# Patient Record
Sex: MALE | Race: OTHER | HISPANIC OR LATINO | URBAN - METROPOLITAN AREA
[De-identification: names, ages, dates, MRNs, and addresses within clinical notes are randomized per-mention and may not be internally consistent; named-entity substitution may affect disease eponyms.]

---

## 2022-12-12 ENCOUNTER — INPATIENT (INPATIENT)
Facility: HOSPITAL | Age: 32
LOS: 6 days | Discharge: HOME | End: 2022-12-19
Attending: HOSPITALIST | Admitting: HOSPITALIST
Payer: MEDICAID

## 2022-12-12 VITALS
DIASTOLIC BLOOD PRESSURE: 62 MMHG | RESPIRATION RATE: 16 BRPM | HEART RATE: 84 BPM | OXYGEN SATURATION: 100 % | SYSTOLIC BLOOD PRESSURE: 116 MMHG | TEMPERATURE: 97 F

## 2022-12-12 LAB
ALBUMIN SERPL ELPH-MCNC: 3.4 G/DL — SIGNIFICANT CHANGE UP (ref 3.3–5)
ALP SERPL-CCNC: 160 U/L — HIGH (ref 40–120)
ALT FLD-CCNC: 16 U/L — SIGNIFICANT CHANGE UP (ref 4–41)
ANION GAP SERPL CALC-SCNC: 12 MMOL/L — SIGNIFICANT CHANGE UP (ref 7–14)
AST SERPL-CCNC: 43 U/L — HIGH (ref 4–40)
BILIRUB SERPL-MCNC: 0.4 MG/DL — SIGNIFICANT CHANGE UP (ref 0.2–1.2)
BUN SERPL-MCNC: 6 MG/DL — LOW (ref 7–23)
CALCIUM SERPL-MCNC: 8.9 MG/DL — SIGNIFICANT CHANGE UP (ref 8.4–10.5)
CHLORIDE SERPL-SCNC: 104 MMOL/L — SIGNIFICANT CHANGE UP (ref 98–107)
CO2 SERPL-SCNC: 25 MMOL/L — SIGNIFICANT CHANGE UP (ref 22–31)
CREAT SERPL-MCNC: 0.36 MG/DL — LOW (ref 0.5–1.3)
CRP SERPL-MCNC: 12.9 MG/L — HIGH
EGFR: 154 ML/MIN/1.73M2 — SIGNIFICANT CHANGE UP
ERYTHROCYTE [SEDIMENTATION RATE] IN BLOOD: 69 MM/HR — HIGH (ref 1–15)
GLUCOSE SERPL-MCNC: 94 MG/DL — SIGNIFICANT CHANGE UP (ref 70–99)
HCT VFR BLD CALC: 29.2 % — LOW (ref 39–50)
HGB BLD-MCNC: 8.7 G/DL — LOW (ref 13–17)
HIV 1+2 AB+HIV1 P24 AG SERPL QL IA: SIGNIFICANT CHANGE UP
IANC: 1.54 K/UL — LOW (ref 1.8–7.4)
MAGNESIUM SERPL-MCNC: 1.7 MG/DL — SIGNIFICANT CHANGE UP (ref 1.6–2.6)
MCHC RBC-ENTMCNC: 23.1 PG — LOW (ref 27–34)
MCHC RBC-ENTMCNC: 29.8 GM/DL — LOW (ref 32–36)
MCV RBC AUTO: 77.7 FL — LOW (ref 80–100)
PHOSPHATE SERPL-MCNC: 4.1 MG/DL — SIGNIFICANT CHANGE UP (ref 2.5–4.5)
PLATELET # BLD AUTO: 282 K/UL — SIGNIFICANT CHANGE UP (ref 150–400)
POTASSIUM SERPL-MCNC: 3.7 MMOL/L — SIGNIFICANT CHANGE UP (ref 3.5–5.3)
POTASSIUM SERPL-SCNC: 3.7 MMOL/L — SIGNIFICANT CHANGE UP (ref 3.5–5.3)
PROT SERPL-MCNC: 8.8 G/DL — HIGH (ref 6–8.3)
RBC # BLD: 3.76 M/UL — LOW (ref 4.2–5.8)
RBC # FLD: 21.2 % — HIGH (ref 10.3–14.5)
SODIUM SERPL-SCNC: 141 MMOL/L — SIGNIFICANT CHANGE UP (ref 135–145)
WBC # BLD: 3.53 K/UL — LOW (ref 3.8–10.5)
WBC # FLD AUTO: 3.53 K/UL — LOW (ref 3.8–10.5)

## 2022-12-12 PROCEDURE — 73590 X-RAY EXAM OF LOWER LEG: CPT | Mod: 26,RT

## 2022-12-12 PROCEDURE — 99285 EMERGENCY DEPT VISIT HI MDM: CPT

## 2022-12-12 RX ORDER — PIPERACILLIN AND TAZOBACTAM 4; .5 G/20ML; G/20ML
3.38 INJECTION, POWDER, LYOPHILIZED, FOR SOLUTION INTRAVENOUS ONCE
Refills: 0 | Status: COMPLETED | OUTPATIENT
Start: 2022-12-12 | End: 2022-12-12

## 2022-12-12 RX ORDER — VANCOMYCIN HCL 1 G
1000 VIAL (EA) INTRAVENOUS ONCE
Refills: 0 | Status: COMPLETED | OUTPATIENT
Start: 2022-12-12 | End: 2022-12-12

## 2022-12-12 RX ORDER — OXYCODONE HYDROCHLORIDE 5 MG/1
5 TABLET ORAL ONCE
Refills: 0 | Status: DISCONTINUED | OUTPATIENT
Start: 2022-12-12 | End: 2022-12-12

## 2022-12-12 RX ADMIN — PIPERACILLIN AND TAZOBACTAM 200 GRAM(S): 4; .5 INJECTION, POWDER, LYOPHILIZED, FOR SOLUTION INTRAVENOUS at 22:38

## 2022-12-12 RX ADMIN — OXYCODONE HYDROCHLORIDE 5 MILLIGRAM(S): 5 TABLET ORAL at 22:49

## 2022-12-12 NOTE — ED PROVIDER NOTE - PHYSICAL EXAMINATION
PHYSICAL EXAM:  GENERAL: appears in pain  HEAD:  Atraumatic, Normocephalic  EYES: EOMI, PERRLA, conjunctiva and sclera clear  ENT: Moist mucous membranes  NECK: Supple, No JVD  CHEST/LUNG: Clear to auscultation bilaterally; No rales, rhonchi, wheezing, or rubs. Unlabored respirations  HEART: Regular rate and rhythm; No murmurs, rubs, or gallops  ABDOMEN: Bowel sounds present; Soft, Nontender, Nondistended.  EXTREMITIES: bilateral lower extremity edema, worse on right side with cellulitic changes, RLE wound approximately 2 cm x 2cm with purulence   NERVOUS SYSTEM: Alert & Oriented X3, speech clear. No deficits   MSK: FROM all 4 extremities, full and equal strength  SKIN: No rashes or lesions

## 2022-12-12 NOTE — ED ADULT TRIAGE NOTE - CHIEF COMPLAINT QUOTE
Pt presents to ED ambulatory with c/o nonhealing wound to R lower extremity x 3 years. Pt reports multiple hospital visits with "no treatment". Pt reports minimal outpatient follow up. Pt appears unkept.

## 2022-12-12 NOTE — ED PROVIDER NOTE - OBJECTIVE STATEMENT
Mr. Hinds is a Bulgarian-speaking 33 y/o male current smoker, current alcohol use with no known PMHx who presents with worsening right lower extremity wound. He reports worsening pain for the past few weeks which is why he came to this hospital, previously visited multiple hospitals where he got pain meds but no further workup or treatment. 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 Denies fever or chills at home. Patient is currently homeless, reports intermittent marijuana use.

## 2022-12-12 NOTE — ED PROVIDER NOTE - CARE PLAN
Assessment and plan of treatment:	Obtain labs, RLE xray, administer empiric antibiotics.   Principal Discharge DX:	Cellulitis  Assessment and plan of treatment:	Obtain labs, RLE xray, administer empiric antibiotics.   1

## 2022-12-12 NOTE — ED PROVIDER NOTE - ATTENDING CONTRIBUTION TO CARE
I have personally performed a face to face medical and diagnostic evaluation of the patient. I have discussed with and reviewed the Resident's note and agree with the History, ROS, Physical Exam and MDM unless otherwise indicated. A brief summary of my personal evaluation and impression can be found below.    32-year-old male with no past medical history, and no primary care follow-up outpatient presenting with chief complaint of right leg wound.  Patient states its been going on for multiple years, but recently started becoming more tender and more purulent.  Patient is homeless and has not seen a doctor in multiple years.  No fevers, chills, nausea, vomiting.  No antibiotics for the wound.  On exam, vital signs stable, bilateral leg edema, open wound of the right shin with some purulence.  No crepitus on exam.  Patient denies any other complaints.  Will obtain labs including ESR, CRP.  Will obtain x-ray to assess for tracking gas or underlying osteo-.  Given duration of symptoms will start on broad spectrum antibiotics.  Patient will need admission for more comprehensive work-up.  Reassess to dispo. Colton Herrera, ED Attending

## 2022-12-13 DIAGNOSIS — F10.239 ALCOHOL DEPENDENCE WITH WITHDRAWAL, UNSPECIFIED: ICD-10-CM

## 2022-12-13 DIAGNOSIS — D63.8 ANEMIA IN OTHER CHRONIC DISEASES CLASSIFIED ELSEWHERE: ICD-10-CM

## 2022-12-13 DIAGNOSIS — L03.90 CELLULITIS, UNSPECIFIED: ICD-10-CM

## 2022-12-13 DIAGNOSIS — K72.00 ACUTE AND SUBACUTE HEPATIC FAILURE WITHOUT COMA: ICD-10-CM

## 2022-12-13 DIAGNOSIS — Z65.9 PROBLEM RELATED TO UNSPECIFIED PSYCHOSOCIAL CIRCUMSTANCES: ICD-10-CM

## 2022-12-13 DIAGNOSIS — Z29.9 ENCOUNTER FOR PROPHYLACTIC MEASURES, UNSPECIFIED: ICD-10-CM

## 2022-12-13 LAB
A1C WITH ESTIMATED AVERAGE GLUCOSE RESULT: 5.7 % — HIGH (ref 4–5.6)
ALBUMIN SERPL ELPH-MCNC: 2.8 G/DL — LOW (ref 3.3–5)
ALP SERPL-CCNC: 131 U/L — HIGH (ref 40–120)
ALT FLD-CCNC: 19 U/L — SIGNIFICANT CHANGE UP (ref 4–41)
ANION GAP SERPL CALC-SCNC: 10 MMOL/L — SIGNIFICANT CHANGE UP (ref 7–14)
ANISOCYTOSIS BLD QL: SLIGHT — SIGNIFICANT CHANGE UP
APAP SERPL-MCNC: <10 UG/ML — LOW (ref 15–25)
AST SERPL-CCNC: 40 U/L — SIGNIFICANT CHANGE UP (ref 4–40)
BASOPHILS # BLD AUTO: 0.1 K/UL — SIGNIFICANT CHANGE UP (ref 0–0.2)
BASOPHILS NFR BLD AUTO: 2.7 % — HIGH (ref 0–2)
BILIRUB DIRECT SERPL-MCNC: <0.2 MG/DL — SIGNIFICANT CHANGE UP (ref 0–0.3)
BILIRUB INDIRECT FLD-MCNC: >0.4 MG/DL — SIGNIFICANT CHANGE UP (ref 0–1)
BILIRUB SERPL-MCNC: 0.6 MG/DL — SIGNIFICANT CHANGE UP (ref 0.2–1.2)
BUN SERPL-MCNC: 6 MG/DL — LOW (ref 7–23)
CALCIUM SERPL-MCNC: 8.8 MG/DL — SIGNIFICANT CHANGE UP (ref 8.4–10.5)
CHLORIDE SERPL-SCNC: 103 MMOL/L — SIGNIFICANT CHANGE UP (ref 98–107)
CO2 SERPL-SCNC: 24 MMOL/L — SIGNIFICANT CHANGE UP (ref 22–31)
CREAT SERPL-MCNC: 0.48 MG/DL — LOW (ref 0.5–1.3)
EGFR: 142 ML/MIN/1.73M2 — SIGNIFICANT CHANGE UP
EOSINOPHIL # BLD AUTO: 0.19 K/UL — SIGNIFICANT CHANGE UP (ref 0–0.5)
EOSINOPHIL NFR BLD AUTO: 5.3 % — SIGNIFICANT CHANGE UP (ref 0–6)
ESTIMATED AVERAGE GLUCOSE: 117 — SIGNIFICANT CHANGE UP
ETHANOL SERPL-MCNC: 45 MG/DL — HIGH
FERRITIN SERPL-MCNC: 40 NG/ML — SIGNIFICANT CHANGE UP (ref 30–400)
FLUAV AG NPH QL: SIGNIFICANT CHANGE UP
FLUBV AG NPH QL: SIGNIFICANT CHANGE UP
GLUCOSE SERPL-MCNC: 80 MG/DL — SIGNIFICANT CHANGE UP (ref 70–99)
HAV IGM SER-ACNC: SIGNIFICANT CHANGE UP
HBV CORE IGM SER-ACNC: SIGNIFICANT CHANGE UP
HBV SURFACE AG SER-ACNC: SIGNIFICANT CHANGE UP
HCV AB S/CO SERPL IA: 0.15 S/CO — SIGNIFICANT CHANGE UP (ref 0–0.99)
HCV AB SERPL-IMP: SIGNIFICANT CHANGE UP
HYPOCHROMIA BLD QL: SIGNIFICANT CHANGE UP
IRON SATN MFR SERPL: 10 % — LOW (ref 14–50)
IRON SATN MFR SERPL: 30 UG/DL — LOW (ref 45–165)
LYMPHOCYTES # BLD AUTO: 1.32 K/UL — SIGNIFICANT CHANGE UP (ref 1–3.3)
LYMPHOCYTES # BLD AUTO: 37.5 % — SIGNIFICANT CHANGE UP (ref 13–44)
MAGNESIUM SERPL-MCNC: 1.7 MG/DL — SIGNIFICANT CHANGE UP (ref 1.6–2.6)
MICROCYTES BLD QL: SIGNIFICANT CHANGE UP
MONOCYTES # BLD AUTO: 0.16 K/UL — SIGNIFICANT CHANGE UP (ref 0–0.9)
MONOCYTES NFR BLD AUTO: 4.5 % — SIGNIFICANT CHANGE UP (ref 2–14)
NEUTROPHILS # BLD AUTO: 1.64 K/UL — LOW (ref 1.8–7.4)
NEUTROPHILS NFR BLD AUTO: 46.4 % — SIGNIFICANT CHANGE UP (ref 43–77)
PHOSPHATE SERPL-MCNC: 4.6 MG/DL — HIGH (ref 2.5–4.5)
PLAT MORPH BLD: ABNORMAL
PLATELET COUNT - ESTIMATE: NORMAL — SIGNIFICANT CHANGE UP
POIKILOCYTOSIS BLD QL AUTO: SLIGHT — SIGNIFICANT CHANGE UP
POLYCHROMASIA BLD QL SMEAR: SLIGHT — SIGNIFICANT CHANGE UP
POTASSIUM SERPL-MCNC: 3.6 MMOL/L — SIGNIFICANT CHANGE UP (ref 3.5–5.3)
POTASSIUM SERPL-SCNC: 3.6 MMOL/L — SIGNIFICANT CHANGE UP (ref 3.5–5.3)
PROT SERPL-MCNC: 7.9 G/DL — SIGNIFICANT CHANGE UP (ref 6–8.3)
RBC BLD AUTO: ABNORMAL
RSV RNA NPH QL NAA+NON-PROBE: SIGNIFICANT CHANGE UP
SALICYLATES SERPL-MCNC: <0.3 MG/DL — LOW (ref 15–30)
SARS-COV-2 RNA SPEC QL NAA+PROBE: SIGNIFICANT CHANGE UP
SODIUM SERPL-SCNC: 137 MMOL/L — SIGNIFICANT CHANGE UP (ref 135–145)
TIBC SERPL-MCNC: 309 UG/DL — SIGNIFICANT CHANGE UP (ref 220–430)
TOXICOLOGY SCREEN, DRUGS OF ABUSE, SERUM RESULT: SIGNIFICANT CHANGE UP
UIBC SERPL-MCNC: 279 UG/DL — SIGNIFICANT CHANGE UP (ref 110–370)
VARIANT LYMPHS # BLD: 3.6 % — SIGNIFICANT CHANGE UP (ref 0–6)

## 2022-12-13 PROCEDURE — 76700 US EXAM ABDOM COMPLETE: CPT | Mod: 26

## 2022-12-13 PROCEDURE — 93010 ELECTROCARDIOGRAM REPORT: CPT

## 2022-12-13 PROCEDURE — 73701 CT LOWER EXTREMITY W/DYE: CPT | Mod: 26,RT

## 2022-12-13 PROCEDURE — 99223 1ST HOSP IP/OBS HIGH 75: CPT

## 2022-12-13 PROCEDURE — 12345: CPT | Mod: NC

## 2022-12-13 PROCEDURE — 93971 EXTREMITY STUDY: CPT | Mod: 26,LT

## 2022-12-13 RX ORDER — SODIUM CHLORIDE 9 MG/ML
1000 INJECTION, SOLUTION INTRAVENOUS
Refills: 0 | Status: DISCONTINUED | OUTPATIENT
Start: 2022-12-13 | End: 2022-12-18

## 2022-12-13 RX ORDER — THIAMINE MONONITRATE (VIT B1) 100 MG
100 TABLET ORAL DAILY
Refills: 0 | Status: DISCONTINUED | OUTPATIENT
Start: 2022-12-16 | End: 2022-12-19

## 2022-12-13 RX ORDER — THIAMINE MONONITRATE (VIT B1) 100 MG
500 TABLET ORAL THREE TIMES A DAY
Refills: 0 | Status: COMPLETED | OUTPATIENT
Start: 2022-12-13 | End: 2022-12-15

## 2022-12-13 RX ORDER — VANCOMYCIN HCL 1 G
1000 VIAL (EA) INTRAVENOUS EVERY 12 HOURS
Refills: 0 | Status: DISCONTINUED | OUTPATIENT
Start: 2022-12-13 | End: 2022-12-14

## 2022-12-13 RX ORDER — FERROUS SULFATE 325(65) MG
325 TABLET ORAL
Refills: 0 | Status: DISCONTINUED | OUTPATIENT
Start: 2022-12-13 | End: 2022-12-19

## 2022-12-13 RX ORDER — CEFAZOLIN SODIUM 1 G
1000 VIAL (EA) INJECTION EVERY 8 HOURS
Refills: 0 | Status: DISCONTINUED | OUTPATIENT
Start: 2022-12-13 | End: 2022-12-13

## 2022-12-13 RX ORDER — FOLIC ACID 0.8 MG
1 TABLET ORAL DAILY
Refills: 0 | Status: DISCONTINUED | OUTPATIENT
Start: 2022-12-13 | End: 2022-12-19

## 2022-12-13 RX ORDER — ACETAMINOPHEN 500 MG
650 TABLET ORAL EVERY 6 HOURS
Refills: 0 | Status: DISCONTINUED | OUTPATIENT
Start: 2022-12-13 | End: 2022-12-19

## 2022-12-13 RX ORDER — LANOLIN ALCOHOL/MO/W.PET/CERES
3 CREAM (GRAM) TOPICAL AT BEDTIME
Refills: 0 | Status: DISCONTINUED | OUTPATIENT
Start: 2022-12-13 | End: 2022-12-19

## 2022-12-13 RX ORDER — INFLUENZA VIRUS VACCINE 15; 15; 15; 15 UG/.5ML; UG/.5ML; UG/.5ML; UG/.5ML
0.5 SUSPENSION INTRAMUSCULAR ONCE
Refills: 0 | Status: DISCONTINUED | OUTPATIENT
Start: 2022-12-13 | End: 2022-12-19

## 2022-12-13 RX ORDER — PANTOPRAZOLE SODIUM 20 MG/1
40 TABLET, DELAYED RELEASE ORAL
Refills: 0 | Status: DISCONTINUED | OUTPATIENT
Start: 2022-12-13 | End: 2022-12-19

## 2022-12-13 RX ADMIN — Medication 250 MILLIGRAM(S): at 12:30

## 2022-12-13 RX ADMIN — Medication 105 MILLIGRAM(S): at 07:25

## 2022-12-13 RX ADMIN — Medication 1 TABLET(S): at 12:41

## 2022-12-13 RX ADMIN — Medication 650 MILLIGRAM(S): at 22:13

## 2022-12-13 RX ADMIN — Medication 2 MILLIGRAM(S): at 07:24

## 2022-12-13 RX ADMIN — Medication 650 MILLIGRAM(S): at 16:30

## 2022-12-13 RX ADMIN — Medication 105 MILLIGRAM(S): at 23:19

## 2022-12-13 RX ADMIN — Medication 650 MILLIGRAM(S): at 15:32

## 2022-12-13 RX ADMIN — Medication 250 MILLIGRAM(S): at 01:08

## 2022-12-13 RX ADMIN — Medication 1 MILLIGRAM(S): at 12:40

## 2022-12-13 RX ADMIN — Medication 105 MILLIGRAM(S): at 15:37

## 2022-12-13 RX ADMIN — Medication 100 MILLIGRAM(S): at 08:44

## 2022-12-13 NOTE — H&P ADULT - PROBLEM SELECTOR PLAN 2
Assessment:  - patient with WBC 3.53 and Hb 8.7  - MCV 77  - likely from chronic myelosuppression from ETOH use and iron deficiency anemia    Plan:  - will give multivitamin  - advise to stop drinking  - will send iron panel  - will supplement with iron if found to have low ferritin/low iron

## 2022-12-13 NOTE — H&P ADULT - PROBLEM SELECTOR PLAN 1
Assessment:  - patient presented for cellulitis of the R leg  - labs significant for leukopenia, but can be attributed to myelosuppression from chronic ETOH use  - physical exam shows a poorly kempt leg with crusted purulence on the R shin  - x-ray of R leg - Diffuse lower extremity soft tissue edema.    Plan:  - c/w vanco and cefazolin for now  - blood cultures pending  - wound care consult  - will obtain CT R leg because of chronic and prolonged cellulitis to r/o abscess

## 2022-12-13 NOTE — H&P ADULT - NSHPPHYSICALEXAM_GEN_ALL_CORE
PHYSICAL EXAM:  VITALS: Vital Signs Last 24 Hrs  T(C): 36.9 (13 Dec 2022 01:39), Max: 37.3 (13 Dec 2022 00:57)  T(F): 98.5 (13 Dec 2022 01:39), Max: 99.2 (13 Dec 2022 00:57)  HR: 97 (13 Dec 2022 01:39) (84 - 104)  BP: 102/51 (13 Dec 2022 01:39) (102/51 - 116/62)  BP(mean): --  RR: 18 (13 Dec 2022 01:39) (16 - 18)  SpO2: 97% (13 Dec 2022 01:39) (96% - 100%)    Parameters below as of 13 Dec 2022 01:39  Patient On (Oxygen Delivery Method): room air      GENERAL: NAD, well-developed, poorly kempt, disheveled  HEAD:  Atraumatic, Normocephalic  EYES: EOMI, PERRL, conjunctiva and sclera clear  ENT: Moist Mucus Membranes present, no ulcers appreciated  NECK: Supple, No JVD  CHEST/LUNG: Clear to auscultation bilaterally; No wheezes, rales or rhonchi, no accessory muscle use  HEART: Regular rate and rhythm; No murmurs, rubs, or gallops, (+)S1, S2  ABDOMEN: Soft, Nontender, Nondistended; Normal Bowel sounds   EXTREMITIES:  2+ Peripheral Pulses, R leg with significant erythema and superficial purulent crusting on the shin, swelling b/l, cellulitis warm to touch  PSYCH: normal mood and affect  NEUROLOGY: AAOx3, non-focal  SKIN: No rashes or lesions

## 2022-12-13 NOTE — H&P ADULT - NSHPLABSRESULTS_GEN_ALL_CORE
8.7    3.53  )-----------( 282      ( 12 Dec 2022 22:13 )             29.2       12-12    141  |  104  |  6<L>  ----------------------------<  94  3.7   |  25  |  0.36<L>    Ca    8.9      12 Dec 2022 22:13  Phos  4.1     12-12  Mg     1.70     12-12    TPro  8.8<H>  /  Alb  3.4  /  TBili  0.4  /  DBili  x   /  AST  43<H>  /  ALT  16  /  AlkPhos  160<H>  12-12

## 2022-12-13 NOTE — H&P ADULT - NSHPREVIEWOFSYSTEMS_GEN_ALL_CORE
REVIEW OF SYSTEMS:    CONSTITUTIONAL: No weakness, fevers or chills  EYES/ENT: No visual changes;  No dysphagia; No sore throat; No rhinorrhea; No sinus pain/pressure  NECK: No pain or stiffness  RESPIRATORY: No cough, wheezing, hemoptysis; No shortness of breath  CARDIOVASCULAR: No chest pain or palpitations; No lower extremity edema  GASTROINTESTINAL: No abdominal or epigastric pain. No nausea, vomiting, or hematemesis; No diarrhea or constipation. No melena or hematochezia.  GENITOURINARY: No dysuria, frequency or hematuria  NEUROLOGICAL: No numbness or weakness  MSK: ambulates without assistance  SKIN: + R lower leg erythema and purulence on the skin  All other review of systems is negative unless indicated above.

## 2022-12-13 NOTE — PATIENT PROFILE ADULT - FALL HARM RISK - RISK INTERVENTIONS
Assistance OOB with selected safe patient handling equipment/Assistance with ambulation/Communicate Fall Risk and Risk Factors to all staff, patient, and family/Monitor for mental status changes/Monitor gait and stability/Reinforce activity limits and safety measures with patient and family/Toileting schedule using arm’s reach rule for commode and bathroom/Use of alarms - bed, chair and/or voice tab/Visual Cue: Yellow wristband/Bed in lowest position, wheels locked, appropriate side rails in place/Call bell, personal items and telephone in reach/Instruct patient to call for assistance before getting out of bed or chair/Non-slip footwear when patient is out of bed/[place] to call system/Physically safe environment - no spills, clutter or unnecessary equipment/Purposeful Proactive Rounding/Room/bathroom lighting operational, light cord in reach

## 2022-12-13 NOTE — ED ADULT NURSE NOTE - OBJECTIVE STATEMENT
Break RN note- Patient resting quietly in bed, breathing even and nonlabored. RLE wound clean dry intact. No acute distress. Patient medicated as ordered. Patient appears comfortable. Safety maintained. Patient Stable upon exiting the room.

## 2022-12-13 NOTE — H&P ADULT - PROBLEM SELECTOR PLAN 3
Assessment:  - patient known to be a chronic drinker  - CIWA score 3    Plan:  - f/u ETOH level  - will send lipase level  - will start CIWA protocol, will start symptomatic triggered ativan protocol for now  - low threshold for ICU for acute withdrawal if patient is not responding to ativan  - c/w multivitamin, thiamine 500mg Q8hr for 3 days then thiamine 100mg QD  - c/w maintenance fluids  - will order abdomen U/S to evaluate for cirrhosis, from mild hepatitis

## 2022-12-13 NOTE — H&P ADULT - HISTORY OF PRESENT ILLNESS
This is a 33 y/o M with pmhx of ETOH abuse presented to the ED for worsening of cellulitis on his R leg. The patient is undomicled and does not have outpatient follow up. Patient has multiple ER visits at different hospitals for the same complaint and states that they did nothing for him. The patient reported erythema, tenderness on the R shin. There was some purulence a few days ago so he came to the ED for evaluation. 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 The patient denies fevers. He also is a chronic ETOH drinker, last drank yesterday, 1 can of beer and a few bottles of liquor. He denies n/v, anxiety. Denies hx of ETOH withdrawal in the past, denies admissions or ICU admissions for ETOH withdrawal. Denies abdominal or epigastric pain.

## 2022-12-13 NOTE — H&P ADULT - PROBLEM SELECTOR PLAN 4
- low risk for dvt - AST/ALT 43/16  - will obtain abdomen ultrasound to evaluate for fatty liver diease  - trend AST/ALT

## 2022-12-13 NOTE — H&P ADULT - ASSESSMENT
31 y/o M with pmhx of ETOH abuse presented to the ED for worsening of cellulitis on his R leg. Labs show leukopenia and anemia. Patient also concerning for possible ETOH withdrawal. Admit for cellulitis and ETOH withdrawal.

## 2022-12-14 LAB
ALBUMIN SERPL ELPH-MCNC: 2.9 G/DL — LOW (ref 3.3–5)
ALP SERPL-CCNC: 116 U/L — SIGNIFICANT CHANGE UP (ref 40–120)
ALT FLD-CCNC: 16 U/L — SIGNIFICANT CHANGE UP (ref 4–41)
ANION GAP SERPL CALC-SCNC: 11 MMOL/L — SIGNIFICANT CHANGE UP (ref 7–14)
ANION GAP SERPL CALC-SCNC: 12 MMOL/L — SIGNIFICANT CHANGE UP (ref 7–14)
AST SERPL-CCNC: 38 U/L — SIGNIFICANT CHANGE UP (ref 4–40)
BASOPHILS # BLD AUTO: 0.08 K/UL — SIGNIFICANT CHANGE UP (ref 0–0.2)
BASOPHILS NFR BLD AUTO: 1.7 % — SIGNIFICANT CHANGE UP (ref 0–2)
BILIRUB SERPL-MCNC: 1 MG/DL — SIGNIFICANT CHANGE UP (ref 0.2–1.2)
BUN SERPL-MCNC: 8 MG/DL — SIGNIFICANT CHANGE UP (ref 7–23)
BUN SERPL-MCNC: 8 MG/DL — SIGNIFICANT CHANGE UP (ref 7–23)
CALCIUM SERPL-MCNC: 9.1 MG/DL — SIGNIFICANT CHANGE UP (ref 8.4–10.5)
CALCIUM SERPL-MCNC: 9.1 MG/DL — SIGNIFICANT CHANGE UP (ref 8.4–10.5)
CHLORIDE SERPL-SCNC: 101 MMOL/L — SIGNIFICANT CHANGE UP (ref 98–107)
CHLORIDE SERPL-SCNC: 102 MMOL/L — SIGNIFICANT CHANGE UP (ref 98–107)
CO2 SERPL-SCNC: 25 MMOL/L — SIGNIFICANT CHANGE UP (ref 22–31)
CO2 SERPL-SCNC: 26 MMOL/L — SIGNIFICANT CHANGE UP (ref 22–31)
CREAT SERPL-MCNC: 0.56 MG/DL — SIGNIFICANT CHANGE UP (ref 0.5–1.3)
CREAT SERPL-MCNC: 0.57 MG/DL — SIGNIFICANT CHANGE UP (ref 0.5–1.3)
EGFR: 134 ML/MIN/1.73M2 — SIGNIFICANT CHANGE UP
EGFR: 135 ML/MIN/1.73M2 — SIGNIFICANT CHANGE UP
EOSINOPHIL # BLD AUTO: 0.25 K/UL — SIGNIFICANT CHANGE UP (ref 0–0.5)
EOSINOPHIL NFR BLD AUTO: 5.2 % — SIGNIFICANT CHANGE UP (ref 0–6)
GLUCOSE SERPL-MCNC: 94 MG/DL — SIGNIFICANT CHANGE UP (ref 70–99)
GLUCOSE SERPL-MCNC: 94 MG/DL — SIGNIFICANT CHANGE UP (ref 70–99)
HCT VFR BLD CALC: 29.2 % — LOW (ref 39–50)
HGB BLD-MCNC: 8.7 G/DL — LOW (ref 13–17)
IANC: 2.65 K/UL — SIGNIFICANT CHANGE UP (ref 1.8–7.4)
IMM GRANULOCYTES NFR BLD AUTO: 0.4 % — SIGNIFICANT CHANGE UP (ref 0–0.9)
LYMPHOCYTES # BLD AUTO: 1.12 K/UL — SIGNIFICANT CHANGE UP (ref 1–3.3)
LYMPHOCYTES # BLD AUTO: 23.3 % — SIGNIFICANT CHANGE UP (ref 13–44)
MAGNESIUM SERPL-MCNC: 1.6 MG/DL — SIGNIFICANT CHANGE UP (ref 1.6–2.6)
MAGNESIUM SERPL-MCNC: 1.6 MG/DL — SIGNIFICANT CHANGE UP (ref 1.6–2.6)
MCHC RBC-ENTMCNC: 23.1 PG — LOW (ref 27–34)
MCHC RBC-ENTMCNC: 29.8 GM/DL — LOW (ref 32–36)
MCV RBC AUTO: 77.7 FL — LOW (ref 80–100)
MONOCYTES # BLD AUTO: 0.68 K/UL — SIGNIFICANT CHANGE UP (ref 0–0.9)
MONOCYTES NFR BLD AUTO: 14.2 % — HIGH (ref 2–14)
NEUTROPHILS # BLD AUTO: 2.65 K/UL — SIGNIFICANT CHANGE UP (ref 1.8–7.4)
NEUTROPHILS NFR BLD AUTO: 55.2 % — SIGNIFICANT CHANGE UP (ref 43–77)
NRBC # BLD: 0 /100 WBCS — SIGNIFICANT CHANGE UP (ref 0–0)
NRBC # FLD: 0 K/UL — SIGNIFICANT CHANGE UP (ref 0–0)
PHOSPHATE SERPL-MCNC: 4.2 MG/DL — SIGNIFICANT CHANGE UP (ref 2.5–4.5)
PHOSPHATE SERPL-MCNC: 4.2 MG/DL — SIGNIFICANT CHANGE UP (ref 2.5–4.5)
PLATELET # BLD AUTO: 210 K/UL — SIGNIFICANT CHANGE UP (ref 150–400)
POTASSIUM SERPL-MCNC: 3.2 MMOL/L — LOW (ref 3.5–5.3)
POTASSIUM SERPL-MCNC: 3.2 MMOL/L — LOW (ref 3.5–5.3)
POTASSIUM SERPL-SCNC: 3.2 MMOL/L — LOW (ref 3.5–5.3)
POTASSIUM SERPL-SCNC: 3.2 MMOL/L — LOW (ref 3.5–5.3)
PROT SERPL-MCNC: 7.9 G/DL — SIGNIFICANT CHANGE UP (ref 6–8.3)
RBC # BLD: 3.76 M/UL — LOW (ref 4.2–5.8)
RBC # FLD: 20.9 % — HIGH (ref 10.3–14.5)
SODIUM SERPL-SCNC: 138 MMOL/L — SIGNIFICANT CHANGE UP (ref 135–145)
SODIUM SERPL-SCNC: 139 MMOL/L — SIGNIFICANT CHANGE UP (ref 135–145)
TROPONIN T, HIGH SENSITIVITY RESULT: 6 NG/L — SIGNIFICANT CHANGE UP
VANCOMYCIN TROUGH SERPL-MCNC: 5.6 UG/ML — LOW (ref 10–20)
WBC # BLD: 4.8 K/UL — SIGNIFICANT CHANGE UP (ref 3.8–10.5)
WBC # FLD AUTO: 4.8 K/UL — SIGNIFICANT CHANGE UP (ref 3.8–10.5)

## 2022-12-14 PROCEDURE — 99233 SBSQ HOSP IP/OBS HIGH 50: CPT

## 2022-12-14 PROCEDURE — 99223 1ST HOSP IP/OBS HIGH 75: CPT

## 2022-12-14 RX ORDER — VANCOMYCIN HCL 1 G
1250 VIAL (EA) INTRAVENOUS EVERY 12 HOURS
Refills: 0 | Status: DISCONTINUED | OUTPATIENT
Start: 2022-12-14 | End: 2022-12-16

## 2022-12-14 RX ORDER — POTASSIUM CHLORIDE 20 MEQ
40 PACKET (EA) ORAL EVERY 4 HOURS
Refills: 0 | Status: COMPLETED | OUTPATIENT
Start: 2022-12-14 | End: 2022-12-14

## 2022-12-14 RX ADMIN — Medication 40 MILLIEQUIVALENT(S): at 17:37

## 2022-12-14 RX ADMIN — Medication 166.67 MILLIGRAM(S): at 17:37

## 2022-12-14 RX ADMIN — PANTOPRAZOLE SODIUM 40 MILLIGRAM(S): 20 TABLET, DELAYED RELEASE ORAL at 05:28

## 2022-12-14 RX ADMIN — Medication 1 TABLET(S): at 13:26

## 2022-12-14 RX ADMIN — SODIUM CHLORIDE 75 MILLILITER(S): 9 INJECTION, SOLUTION INTRAVENOUS at 02:17

## 2022-12-14 RX ADMIN — Medication 40 MILLIEQUIVALENT(S): at 09:14

## 2022-12-14 RX ADMIN — SODIUM CHLORIDE 75 MILLILITER(S): 9 INJECTION, SOLUTION INTRAVENOUS at 16:26

## 2022-12-14 RX ADMIN — Medication 250 MILLIGRAM(S): at 02:13

## 2022-12-14 RX ADMIN — Medication 325 MILLIGRAM(S): at 09:11

## 2022-12-14 RX ADMIN — Medication 1 MILLIGRAM(S): at 13:26

## 2022-12-14 RX ADMIN — Medication 40 MILLIEQUIVALENT(S): at 13:26

## 2022-12-14 RX ADMIN — Medication 105 MILLIGRAM(S): at 05:29

## 2022-12-14 RX ADMIN — Medication 105 MILLIGRAM(S): at 22:00

## 2022-12-14 RX ADMIN — Medication 105 MILLIGRAM(S): at 13:27

## 2022-12-14 NOTE — CONSULT NOTE ADULT - SUBJECTIVE AND OBJECTIVE BOX
Wound SURGERY CONSULT NOTE    HPI:This is a 33 y/o M with pmhx of ETOH abuse presented to the ED for worsening of cellulitis on his R leg. The patient is undomicled and does not have outpatient follow up. Patient has multiple ER visits at different hospitals for the same complaint and states that they did nothing for him. The patient reported erythema, tenderness on the R shin. There was some purulence a few days ago so he came to the ED for evaluation. 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 The patient denies fevers. He also is a chronic ETOH drinker, last drank yesterday, 1 can of beer and a few bottles of liquor. He denies n/v, anxiety. Denies hx of ETOH withdrawal in the past, denies admissions or ICU admissions for ETOH withdrawal. Denies abdominal or epigastric pain. (13 Dec 2022 02:33)    Wound consult requested to assist w/ management of RLE wound. Patient reports RLE is chronic from a few months ago. Denies trauma. Reports wound appears like is healing. Reports tenderness with wound cleansing. Reports he is homeless with no family in [place]. Reports a SW is assisting to finding him a placement/shelter. Also endorses he is able to walk with some difficulty.     Current Diet: Diet, Regular (12-13-22 @ 02:28)    PAST MEDICAL & SURGICAL HISTORY:  ETOH abuse  No significant past surgical history    REVIEW OF SYSTEMS:   General/Skin/MSK: see HPI and PMH  All other systems negative    MEDICATIONS  (STANDING):  ferrous    sulfate 325 milliGRAM(s) Oral <User Schedule>  folic acid 1 milliGRAM(s) Oral daily  influenza   Vaccine 0.5 milliLiter(s) IntraMuscular once  lactated ringers. 1000 milliLiter(s) (75 mL/Hr) IV Continuous <Continuous>  multivitamin 1 Tablet(s) Oral daily  pantoprazole    Tablet 40 milliGRAM(s) Oral before breakfast  potassium chloride    Tablet ER 40 milliEquivalent(s) Oral every 4 hours  thiamine IVPB 500 milliGRAM(s) IV Intermittent three times a day  vancomycin  IVPB 1250 milliGRAM(s) IV Intermittent every 12 hours    MEDICATIONS  (PRN):  acetaminophen     Tablet .. 650 milliGRAM(s) Oral every 6 hours PRN Temp greater or equal to 38C (100.4F), Mild Pain (1 - 3)  aluminum hydroxide/magnesium hydroxide/simethicone Suspension 30 milliLiter(s) Oral every 4 hours PRN Dyspepsia  LORazepam     Tablet 2 milliGRAM(s) Oral every 2 hours PRN Symptom-triggered 2 point increase in CIWA-Ar  melatonin 3 milliGRAM(s) Oral at bedtime PRN Insomnia    Allergies    No Known Allergies    Intolerances    SOCIAL HISTORY: homeless, denies illicit drugs. + ETOH. Reports trying to " get better"     FAMILY HISTORY:  No pertinent family history in first degree relatives    PHYSICAL EXAM:  Vital Signs Last 24 Hrs  T(C): 36.9 (14 Dec 2022 09:30), Max: 38.2 (13 Dec 2022 22:00)  T(F): 98.4 (14 Dec 2022 09:30), Max: 100.8 (13 Dec 2022 22:00)  HR: 94 (14 Dec 2022 09:30) (87 - 110)  BP: 118/68 (14 Dec 2022 09:30) (108/65 - 129/60)  BP(mean): --  RR: 18 (14 Dec 2022 09:30) (15 - 19)  SpO2: 98% (14 Dec 2022 09:30) (95% - 100%)    Parameters below as of 14 Dec 2022 09:30  Patient On (Oxygen Delivery Method): room air    BMI: 32.7kg/m2. (14 Dec 2022)    Constitutional: NAD/Eating breakfast/A0X4.   Disheveled, long fingernails   (+) low airloss support surface  HEENT: NC/AT, non icteric, mucosa moist, throat clear, trachea midline, neck supple  Cardiovascular: Rate regular to tachy   Respiratory: NAD, RA.   Gastrointestinal soft NT/ND  Neurology : able to follow commands   Musculoskeletal: aROM, no deformities/ contractures  Vascular: BLE equally warm, no cyanosis, clubbing, RLE with edema> Left   DP/PT pulses +2 palpable  no overt ischemia noted  hemosiderin staining  RLE wound of unknown etiology suspect venous ulcer-   Skin:  moist w/ good turgor      LABS/ CULTURES/ RADIOLOGY:                        8.7    4.80  )-----------( 210      ( 14 Dec 2022 05:00 )             29.2       138  |  101  |  8   ----------------------------<  94      [12-14-22 @ 05:00]  3.2   |  25  |  0.57        Ca     9.1     [12-14-22 @ 05:00]      Mg     1.60     [12-14-22 @ 05:00]      Phos  4.2     [12-14-22 @ 05:00]    TPro  7.9  /  Alb  2.9  /  TBili  1.0  /  DBili  x   /  AST  38  /  ALT  16  /  AlkPhos  116  [12-14-22 @ 05:00]      Culture - Blood (collected 12-12-22 @ 22:40)  Source: .Blood Blood-Peripheral  Preliminary Report (12-14-22 @ 03:01):    No growth to date.    Culture - Blood (collected 12-12-22 @ 22:13)  Source: .Blood Blood-Peripheral  Preliminary Report (12-14-22 @ 03:01):    No growth to date.                       Wound SURGERY CONSULT NOTE    HPI:This is a 33 y/o M with pmhx of ETOH abuse presented to the ED for worsening of cellulitis on his R leg. The patient is undomicled and does not have outpatient follow up. Patient has multiple ER visits at different hospitals for the same complaint and states that they did nothing for him. The patient reported erythema, tenderness on the R shin. There was some purulence a few days ago so he came to the ED for evaluation. 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 The patient denies fevers. He also is a chronic ETOH drinker, last drank yesterday, 1 can of beer and a few bottles of liquor. He denies n/v, anxiety. Denies hx of ETOH withdrawal in the past, denies admissions or ICU admissions for ETOH withdrawal. Denies abdominal or epigastric pain. (13 Dec 2022 02:33)    Wound consult requested to assist w/ management of RLE wound. Patient reports RLE is chronic from a few months ago. Denies trauma. Reports wound appears like is healing. Reports tenderness with wound cleansing. Reports he is homeless with no family in [place]. Reports a SW is assisting to finding him a placement/shelter. Also endorses he is able to walk with some difficulty.     Current Diet: Diet, Regular (12-13-22 @ 02:28)    PAST MEDICAL & SURGICAL HISTORY:  ETOH abuse  No significant past surgical history    REVIEW OF SYSTEMS:   General/Skin/MSK: see HPI and PMH  All other systems negative    MEDICATIONS  (STANDING):  ferrous    sulfate 325 milliGRAM(s) Oral <User Schedule>  folic acid 1 milliGRAM(s) Oral daily  influenza   Vaccine 0.5 milliLiter(s) IntraMuscular once  lactated ringers. 1000 milliLiter(s) (75 mL/Hr) IV Continuous <Continuous>  multivitamin 1 Tablet(s) Oral daily  pantoprazole    Tablet 40 milliGRAM(s) Oral before breakfast  potassium chloride    Tablet ER 40 milliEquivalent(s) Oral every 4 hours  thiamine IVPB 500 milliGRAM(s) IV Intermittent three times a day  vancomycin  IVPB 1250 milliGRAM(s) IV Intermittent every 12 hours    MEDICATIONS  (PRN):  acetaminophen     Tablet .. 650 milliGRAM(s) Oral every 6 hours PRN Temp greater or equal to 38C (100.4F), Mild Pain (1 - 3)  aluminum hydroxide/magnesium hydroxide/simethicone Suspension 30 milliLiter(s) Oral every 4 hours PRN Dyspepsia  LORazepam     Tablet 2 milliGRAM(s) Oral every 2 hours PRN Symptom-triggered 2 point increase in CIWA-Ar  melatonin 3 milliGRAM(s) Oral at bedtime PRN Insomnia    Allergies    No Known Allergies    Intolerances    SOCIAL HISTORY: homeless, denies illicit drugs. + ETOH. Reports trying to " get better"     FAMILY HISTORY:  No pertinent family history in first degree relatives    PHYSICAL EXAM:  Vital Signs Last 24 Hrs  T(C): 36.9 (14 Dec 2022 09:30), Max: 38.2 (13 Dec 2022 22:00)  T(F): 98.4 (14 Dec 2022 09:30), Max: 100.8 (13 Dec 2022 22:00)  HR: 94 (14 Dec 2022 09:30) (87 - 110)  BP: 118/68 (14 Dec 2022 09:30) (108/65 - 129/60)  BP(mean): --  RR: 18 (14 Dec 2022 09:30) (15 - 19)  SpO2: 98% (14 Dec 2022 09:30) (95% - 100%)    Parameters below as of 14 Dec 2022 09:30  Patient On (Oxygen Delivery Method): room air    BMI: 32.7kg/m2. (14 Dec 2022)    Constitutional: NAD/Eating breakfast/A0X4.   Disheveled, long fingernails   (+) low airloss support surface  HEENT: NC/AT, non icteric, mucosa moist, throat clear, trachea midline, neck supple  Cardiovascular: Rate regular to tachy   Respiratory: NAD, RA.   Gastrointestinal soft NT/ND  Neurology : able to follow commands   Musculoskeletal: aROM, no deformities/ contractures  Vascular: BLE equally warm, no cyanosis, clubbing, RLE with edema> Left   DP/PT pulses +2 palpable  no overt ischemia noted  hemosiderin staining  RLE chronic trauma wound (Of note, patient known to wound care services from previous admission in 5/23/22, reviewed records and patient comes out with different last name previouly  Skin:  moist w/ good turgor      LABS/ CULTURES/ RADIOLOGY:                        8.7    4.80  )-----------( 210      ( 14 Dec 2022 05:00 )             29.2       138  |  101  |  8   ----------------------------<  94      [12-14-22 @ 05:00]  3.2   |  25  |  0.57        Ca     9.1     [12-14-22 @ 05:00]      Mg     1.60     [12-14-22 @ 05:00]      Phos  4.2     [12-14-22 @ 05:00]    TPro  7.9  /  Alb  2.9  /  TBili  1.0  /  DBili  x   /  AST  38  /  ALT  16  /  AlkPhos  116  [12-14-22 @ 05:00]      Culture - Blood (collected 12-12-22 @ 22:40)  Source: .Blood Blood-Peripheral  Preliminary Report (12-14-22 @ 03:01):    No growth to date.    Culture - Blood (collected 12-12-22 @ 22:13)  Source: .Blood Blood-Peripheral  Preliminary Report (12-14-22 @ 03:01):    No growth to date.                       Wound SURGERY CONSULT NOTE    HPI:This is a 31 y/o M with pmhx of ETOH abuse presented to the ED for worsening of cellulitis on his R leg. The patient is undomicled and does not have outpatient follow up. Patient has multiple ER visits at different hospitals for the same complaint and states that they did nothing for him. The patient reported erythema, tenderness on the R shin. There was some purulence a few days ago so he came to the ED for evaluation. 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 The patient denies fevers. He also is a chronic ETOH drinker, last drank yesterday, 1 can of beer and a few bottles of liquor. He denies n/v, anxiety. Denies hx of ETOH withdrawal in the past, denies admissions or ICU admissions for ETOH withdrawal. Denies abdominal or epigastric pain. (13 Dec 2022 02:33)    Wound consult requested to assist w/ management of RLE wound. Patient reports RLE is chronic from a few months ago. Denies trauma. Reports wound appears like is healing. Reports tenderness with wound cleansing. Reports he is homeless with no family in [place]. Reports a SW is assisting to finding him a placement/shelter. Also endorses he is able to walk with some difficulty.     Current Diet: Diet, Regular (22 @ 02:28)    PAST MEDICAL & SURGICAL HISTORY:  ETOH abuse  No significant past surgical history    REVIEW OF SYSTEMS:   General/Skin/MSK: see HPI and PMH  All other systems negative    MEDICATIONS  (STANDING):  ferrous    sulfate 325 milliGRAM(s) Oral <User Schedule>  folic acid 1 milliGRAM(s) Oral daily  influenza   Vaccine 0.5 milliLiter(s) IntraMuscular once  lactated ringers. 1000 milliLiter(s) (75 mL/Hr) IV Continuous <Continuous>  multivitamin 1 Tablet(s) Oral daily  pantoprazole    Tablet 40 milliGRAM(s) Oral before breakfast  potassium chloride    Tablet ER 40 milliEquivalent(s) Oral every 4 hours  thiamine IVPB 500 milliGRAM(s) IV Intermittent three times a day  vancomycin  IVPB 1250 milliGRAM(s) IV Intermittent every 12 hours    MEDICATIONS  (PRN):  acetaminophen     Tablet .. 650 milliGRAM(s) Oral every 6 hours PRN Temp greater or equal to 38C (100.4F), Mild Pain (1 - 3)  aluminum hydroxide/magnesium hydroxide/simethicone Suspension 30 milliLiter(s) Oral every 4 hours PRN Dyspepsia  LORazepam     Tablet 2 milliGRAM(s) Oral every 2 hours PRN Symptom-triggered 2 point increase in CIWA-Ar  melatonin 3 milliGRAM(s) Oral at bedtime PRN Insomnia    Allergies    No Known Allergies    Intolerances    SOCIAL HISTORY: homeless, denies illicit drugs. + ETOH. Reports trying to " get better"     FAMILY HISTORY:  No pertinent family history in first degree relatives    PHYSICAL EXAM:  Vital Signs Last 24 Hrs  T(C): 36.9 (14 Dec 2022 09:30), Max: 38.2 (13 Dec 2022 22:00)  T(F): 98.4 (14 Dec 2022 09:30), Max: 100.8 (13 Dec 2022 22:00)  HR: 94 (14 Dec 2022 09:30) (87 - 110)  BP: 118/68 (14 Dec 2022 09:30) (108/65 - 129/60)  BP(mean): --  RR: 18 (14 Dec 2022 09:30) (15 - 19)  SpO2: 98% (14 Dec 2022 09:30) (95% - 100%)    Parameters below as of 14 Dec 2022 09:30  Patient On (Oxygen Delivery Method): room air    BMI: 32.7kg/m2. (14 Dec 2022)    Constitutional: NAD/Eating breakfast/A0X4.   Disheveled, long fingernails   (+) low airloss support surface  HEENT: NC/AT, non icteric, mucosa moist, throat clear, trachea midline, neck supple  Cardiovascular: Rate regular to tachy   Respiratory: NAD, RA.   Gastrointestinal soft NT/ND  Neurology : able to follow commands   Musculoskeletal: aROM, no deformities/ contractures  Vascular: BLE equally warm, no cyanosis, clubbing, RLE with edema> Left.   DP/PT pulses +2 palpable  no overt ischemia noted  hemosiderin staining  RLE chronic trauma wound (Of note, patient known to wound care surgery  from previous admission in 22, reviewed records and patient comes out with different last name previously Priscila Horton same )- Entire area including scar tissue/hypopigmentation measuring- [id number]. Within scar tissue open ulcer measures [id number]. Tissue type exposing pink granular and fibrinous tissue. Periwound skin with erythema and hemosiderin staining. Trace edema. No increased warmth, no induration. + Tenderness. Betadine applied and Kerlix/Ace wrap placed.   Reported inconsistent last name for patient to SANTO Lynn.   Skin:  moist w/ good turgor  Psych: calm and cooperative     LABS/ CULTURES/ RADIOLOGY:                        8.7    4.80  )-----------( 210      ( 14 Dec 2022 05:00 )             29.2       138  |  101  |  8   ----------------------------<  94      [22 @ 05:00]  3.2   |  25  |  0.57        Ca     9.1     [22 @ 05:00]      Mg     1.60     [22 @ 05:00]      Phos  4.2     [22 @ 05:00]    TPro  7.9  /  Alb  2.9  /  TBili  1.0  /  DBili  x   /  AST  38  /  ALT  16  /  AlkPhos  116  [22 @ 05:00]      Culture - Blood (collected 22 @ 22:40)  Source: .Blood Blood-Peripheral  Preliminary Report (22 @ 03:01):    No growth to date.    Culture - Blood (collected 22 @ 22:13)  Source: .Blood Blood-Peripheral  Preliminary Report (22 @ 03:01):    No growth to date.      PROCEDURE DATE:  2022          INTERPRETATION:  CT LOWER EXTREMITY WITH IV CONTRAST RIGHT dated   2022 7:27 AM    INDICATION: Pain and swelling    COMPARISON: Tibia and fibula radiographs dated 2022.    TECHNIQUE: CT imaging of the right lower extremity was performed with   contrast. The data was reformatted in the axial, coronal, and sagittal   planes.  Contrast: Omnipaque 350. Administered: 95 cc. Discarded: 5 cc.    FINDINGS:    OSSEOUS STRUCTURES: No fracture. No cortical erosion or destruction is   present. Relative preservation of the joint spaces.  SYNOVIUM/ JOINT FLUID: No joint effusion. No popliteal cyst.  TENDONS: Tendons are preserved.  MUSCLES: No muscle edema. Mild fatty infiltration of the posterior calf   musculature.  NEUROVASCULAR STRUCTURES: Scattered vascular calcifications.  SUBCUTANEOUS SOFT TISSUES: There is moderate to severe soft tissue   swelling with skin thickening. No drainable fluid collection is noted.      IMPRESSION:    1.  Left lower extremity soft tissue swelling and skin thickening. No   drainable fluid collection. Nonspecific but can be seen with cellulitis   in the appropriate clinical setting.  2.  No CT evidence for osteomyelitis.    --- End of Report ---      ACC: [id number] EXAM:  US DPLX LWR EXT VEINS LTD RT                          PROCEDURE DATE:  2022      INTERPRETATION:  CLINICAL INFORMATION: Lower extremity edema    COMPARISON: None available.    TECHNIQUE: Duplex sonography of the RIGHTLOWER extremity veins with   color and spectral Doppler, with and without compression.    FINDINGS: Lower extremity subcutaneous edema, greater distally.    There is normal compressibility of the right common femoral, femoral and   popliteal veins.  The contralateral common femoral vein is patent.  Doppler examination shows normal spontaneous and phasic flow.    Calf veins were not able to be diagnostically visualized.    IMPRESSION:  No evidence of right lower extremity deep venous thrombosis involving the   venous segments able to be examined.      ACC: [id number] EXAM:  XR TIB FIB AP LAT 2 VIEWS RT                          PROCEDURE DATE:  2022          INTERPRETATION:  CLINICAL INFORMATION: Right lower extremity pain.    TECHNIQUE: 2 views of the right tibia/fibula    COMPARISON: None available.    FINDINGS:    No acute displaced fracture or dislocation.  Joint spaces are preserved.  Diffuse lower extremity soft tissue edema.    IMPRESSION:    No acute displaced fracture or dislocation.    --- End of Report ---           Wound SURGERY CONSULT NOTE    HPI:This is a 33 y/o M with pmhx of ETOH abuse presented to the ED for worsening of cellulitis on his R leg. The patient is undomicled and does not have outpatient follow up. Patient has multiple ER visits at different hospitals for the same complaint and states that they did nothing for him. The patient reported erythema, tenderness on the R shin. There was some purulence a few days ago so he came to the ED for evaluation. 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 The patient denies fevers. He also is a chronic ETOH drinker, last drank yesterday, 1 can of beer and a few bottles of liquor. He denies n/v, anxiety. Denies hx of ETOH withdrawal in the past, denies admissions or ICU admissions for ETOH withdrawal. Denies abdominal or epigastric pain. (13 Dec 2022 02:33)    Wound consult requested to assist w/ management of RLE wound. Patient reports RLE is chronic from a few months ago. Denies trauma. Reports wound appears like is healing. Reports tenderness with wound cleansing. Reports he is homeless with no family in [place]. Reports a SW is assisting to finding him a placement/shelter. Also endorses he is able to walk with some difficulty.     Current Diet: Diet, Regular (22 @ 02:28)    PAST MEDICAL & SURGICAL HISTORY:  ETOH abuse  No significant past surgical history    REVIEW OF SYSTEMS:   General/Skin/MSK: see HPI and PMH  All other systems negative    MEDICATIONS  (STANDING):  ferrous    sulfate 325 milliGRAM(s) Oral <User Schedule>  folic acid 1 milliGRAM(s) Oral daily  influenza   Vaccine 0.5 milliLiter(s) IntraMuscular once  lactated ringers. 1000 milliLiter(s) (75 mL/Hr) IV Continuous <Continuous>  multivitamin 1 Tablet(s) Oral daily  pantoprazole    Tablet 40 milliGRAM(s) Oral before breakfast  potassium chloride    Tablet ER 40 milliEquivalent(s) Oral every 4 hours  thiamine IVPB 500 milliGRAM(s) IV Intermittent three times a day  vancomycin  IVPB 1250 milliGRAM(s) IV Intermittent every 12 hours    MEDICATIONS  (PRN):  acetaminophen     Tablet .. 650 milliGRAM(s) Oral every 6 hours PRN Temp greater or equal to 38C (100.4F), Mild Pain (1 - 3)  aluminum hydroxide/magnesium hydroxide/simethicone Suspension 30 milliLiter(s) Oral every 4 hours PRN Dyspepsia  LORazepam     Tablet 2 milliGRAM(s) Oral every 2 hours PRN Symptom-triggered 2 point increase in CIWA-Ar  melatonin 3 milliGRAM(s) Oral at bedtime PRN Insomnia    Allergies    No Known Allergies    Intolerances    SOCIAL HISTORY: homeless, denies illicit drugs. + ETOH. Reports trying to " get better"     FAMILY HISTORY:  No pertinent family history in first degree relatives    PHYSICAL EXAM:  Vital Signs Last 24 Hrs  T(C): 36.9 (14 Dec 2022 09:30), Max: 38.2 (13 Dec 2022 22:00)  T(F): 98.4 (14 Dec 2022 09:30), Max: 100.8 (13 Dec 2022 22:00)  HR: 94 (14 Dec 2022 09:30) (87 - 110)  BP: 118/68 (14 Dec 2022 09:30) (108/65 - 129/60)  BP(mean): --  RR: 18 (14 Dec 2022 09:30) (15 - 19)  SpO2: 98% (14 Dec 2022 09:30) (95% - 100%)    Parameters below as of 14 Dec 2022 09:30  Patient On (Oxygen Delivery Method): room air    BMI: 32.7kg/m2. (14 Dec 2022)    Constitutional: NAD/Eating breakfast/A0X4. Patient mostly Welsh speaking, able to speak to patient in Native language.   Disheveled, long fingernails   (+) low airloss support surface  HEENT: NC/AT, non icteric, mucosa moist, throat clear, trachea midline, neck supple  Cardiovascular: Rate regular to tachy   Respiratory: NAD, RA.   Gastrointestinal soft NT/ND  Neurology : able to follow commands   Musculoskeletal: aROM, no deformities/ contractures  Vascular: BLE equally warm, no cyanosis, clubbing, RLE with edema> Left.   DP/PT pulses +2 palpable  no overt ischemia noted  hemosiderin staining  RLE chronic trauma wound (Of note, patient known to wound care surgery  from previous admission in 22, reviewed records and patient comes out with different last name previously Priscila Horton same )- Entire area including scar tissue/hypopigmentation measuring- [id number]. Within scar tissue open ulcer measures [id number]. Tissue type exposing pink granular and fibrinous tissue. Periwound skin with erythema and hemosiderin staining. Trace edema. No increased warmth, no induration. + Tenderness. Betadine applied and Kerlix/Ace wrap placed.   Reported inconsistent last name for patient to SANTO Lynn.   Skin:  moist w/ good turgor  Psych: calm and cooperative     LABS/ CULTURES/ RADIOLOGY:                        8.7    4.80  )-----------( 210      ( 14 Dec 2022 05:00 )             29.2       138  |  101  |  8   ----------------------------<  94      [22 @ 05:00]  3.2   |  25  |  0.57        Ca     9.1     [22 @ 05:00]      Mg     1.60     [22 @ 05:00]      Phos  4.2     [22 @ 05:00]    TPro  7.9  /  Alb  2.9  /  TBili  1.0  /  DBili  x   /  AST  38  /  ALT  16  /  AlkPhos  116  [22 @ 05:00]      Culture - Blood (collected 22 @ 22:40)  Source: .Blood Blood-Peripheral  Preliminary Report (22 @ 03:01):    No growth to date.    Culture - Blood (collected 22 @ 22:13)  Source: .Blood Blood-Peripheral  Preliminary Report (22 @ 03:01):    No growth to date.      PROCEDURE DATE:  2022          INTERPRETATION:  CT LOWER EXTREMITY WITH IV CONTRAST RIGHT dated   2022 7:27 AM    INDICATION: Pain and swelling    COMPARISON: Tibia and fibula radiographs dated 2022.    TECHNIQUE: CT imaging of the right lower extremity was performed with   contrast. The data was reformatted in the axial, coronal, and sagittal   planes.  Contrast: Omnipaque 350. Administered: 95 cc. Discarded: 5 cc.    FINDINGS:    OSSEOUS STRUCTURES: No fracture. No cortical erosion or destruction is   present. Relative preservation of the joint spaces.  SYNOVIUM/ JOINT FLUID: No joint effusion. No popliteal cyst.  TENDONS: Tendons are preserved.  MUSCLES: No muscle edema. Mild fatty infiltration of the posterior calf   musculature.  NEUROVASCULAR STRUCTURES: Scattered vascular calcifications.  SUBCUTANEOUS SOFT TISSUES: There is moderate to severe soft tissue   swelling with skin thickening. No drainable fluid collection is noted.      IMPRESSION:    1.  Left lower extremity soft tissue swelling and skin thickening. No   drainable fluid collection. Nonspecific but can be seen with cellulitis   in the appropriate clinical setting.  2.  No CT evidence for osteomyelitis.    --- End of Report ---      ACC: [id number] EXAM:  US DPLX LWR EXT VEINS LTD RT                          PROCEDURE DATE:  2022      INTERPRETATION:  CLINICAL INFORMATION: Lower extremity edema    COMPARISON: None available.    TECHNIQUE: Duplex sonography of the RIGHTLOWER extremity veins with   color and spectral Doppler, with and without compression.    FINDINGS: Lower extremity subcutaneous edema, greater distally.    There is normal compressibility of the right common femoral, femoral and   popliteal veins.  The contralateral common femoral vein is patent.  Doppler examination shows normal spontaneous and phasic flow.    Calf veins were not able to be diagnostically visualized.    IMPRESSION:  No evidence of right lower extremity deep venous thrombosis involving the   venous segments able to be examined.      ACC: [id number] EXAM:  XR TIB FIB AP LAT 2 VIEWS RT                          PROCEDURE DATE:  2022          INTERPRETATION:  CLINICAL INFORMATION: Right lower extremity pain.    TECHNIQUE: 2 views of the right tibia/fibula    COMPARISON: None available.    FINDINGS:    No acute displaced fracture or dislocation.  Joint spaces are preserved.  Diffuse lower extremity soft tissue edema.    IMPRESSION:    No acute displaced fracture or dislocation.    --- End of Report ---

## 2022-12-14 NOTE — PROVIDER CONTACT NOTE (OTHER) - ACTION/TREATMENT ORDERED:
PRN Tylenol
Continue to monitor HR.
Give the PO dose of tylenol and recheck temperature
complete an EKG, and draw a troponin

## 2022-12-14 NOTE — PROVIDER CONTACT NOTE (OTHER) - SITUATION
patient's oral temp 100.8
patient complaining of pressure in chest
Heart rate of 115
oral temp 100.6

## 2022-12-14 NOTE — SBIRT NOTE ADULT - NSSBIRTBRIEFINTDET_GEN_A_CORE
MONAE completed SBIRT with pt. Pt reported drinking 10-20 bottles of beer per day. Pt denied having blackouts or injuries as a result of his use. Pt reported motivation to become sober but mentioned it is difficult. SW offered supportive counseling and discuss healthy coping skills. Pt is undocumented with Emergency Medicaid only. Resources given for counseling offered to undocumented immigrants.

## 2022-12-14 NOTE — CONSULT NOTE ADULT - ASSESSMENT
Assessment/Plan: 31 y/o M with pmhx of ETOH abuse presented to the ED for worsening of cellulitis on his R leg. Labs show leukopenia and anemia. Patient also concerning for possible ETOH withdrawal. Admit for cellulitis and ETOH withdrawal.    Wound Consult requested to assist w/ management of RLE ulcer with surrounding cellulitis. Patient known to wound care services last seen in 5/23/2022 for RLU atypical trauma wound to RLE. Patient reported trauma to wound while walking from [facility] to  for days. Patient reports he is currently homeless and has no family in [place]. Reports he is interested in shelter placement.     RLE chronic trauma wound s/p biopsy during last admission   (Of note, previous MR number during last admission   -Biopsy results negative for   -CT of right leg- Left lower extremity soft tissue swelling and skin thickening. No drainable fluid collection. Nonspecific but can be seen with cellulitis in the appropriate clinical setting. No OM. Remainder of findings as per primary team.   -US duplex negative for DVT.   -Hemoglobin A1C- 5.7%  -Compression BLE  -BLE elevation  -Abx per Medicine  Moisturize intact skin w/ SWEEN moisturizer daily.   -Topical Recommendations: Clean wound and periwound skin with NS. Pat dry. Apply Betadine wipe to open ulcer, pat dry. Apply Sween 24 moisturizer to bilateral legs, avoid between toes or over open ulcer).Cover with ABD pad and Kerlix. Compress with ACE wraps. Change daily.     Continue turning and positioning w/ offloading assistive devices as per protocol  Continue w/ Pericare as per protocol  Waffle Cushion to chair when oob to chair  Continue w/ low air loss fluidized bed surface     Care as per medicine, will follow w/ you. Please reconsult if needed it.     Upon discharge f/u as outpatient at [facility] [address] [facility] [phone]  Seen w/ attng Kirk hernández and D/w team    Thank you for this consult  BE Peter, CWLISAN (pager #[phone] or available in [place])    If after 4PM or before 7:30AM on Mon-Friday or weekend/holiday please contact general surgery for urgent matters.   Team A- [id number]   Team B- [id number]  For non-urgent matters e-mail [email or web address]    We spent 50 minutes face to face with this patient of which more than 50% of the time was spent counseling & coordinating care of this pt     Assessment/Plan: 31 y/o M with pmhx of ETOH abuse presented to the ED for worsening of cellulitis on his R leg. Labs show leukopenia and anemia. Patient also concerning for possible ETOH withdrawal. Admit for cellulitis and ETOH withdrawal.    Wound Consult requested to assist w/ management of RLE ulcer with surrounding cellulitis. Patient known to wound care services last seen in 2022 for RLU atypical trauma wound to RLE. Patient reported trauma to wound while walking from [facility] to  for days. Patient reports he is currently homeless and has no family in [place]. Reports he is interested in shelter placement.     RLE chronic trauma wound with surrounding cellulitis (s/p biopsy of RLE wound on  to r/o neoplasm)  (Of note, patient identified as Priscila Horton,  02-Mar-2022) previous MR number [id number])  -CT of right leg- Left lower extremity soft tissue swelling and skin thickening. No drainable fluid collection. Nonspecific but can be seen with cellulitis in the appropriate clinical setting. No OM. Remainder of findings as per primary team.   -Tissue type stable with pink granular and fibrinous tissue  -+Erythema and trace edema to periwound skin suspect surrounding cellulitis   -US duplex negative for DVT.   -Hemoglobin A1C- 5.7%  -Compression BLE  -BLE elevation  -Abx per Medicine  Moisturize intact skin w/ SWEEN moisturizer daily.   -Topical Recommendations: Clean wound and periwound skin with NS. Pat dry. Apply Betadine wipe to open ulcer, pat dry. Apply Sween 24 moisturizer to bilateral legs, avoid between toes or over open ulcer).Cover with ABD pad and Kerlix. Compress with ACE wraps. Change daily.     Alcoholism   -Management as per primary team   -Advised patient on possible associated complications, patient verbalized understanding and reports trying to " recover from that".     Continue turning and positioning w/ offloading assistive devices as per protocol  Continue w/ Pericare as per protocol  Waffle Cushion to chair when oob to chair  Continue w/ low air loss fluidized bed surface     Care as per medicine, will follow w/ you. Please reconsult if needed it.     Upon discharge f/u as outpatient at [facility]  [facility] [phone]  Seen w/ florencia Sales and D/w team  Notified ANM about different last name in chart.     Thank you for this consult  Leyla Carcamo, PATRIA-BC, CWLISAN (pager #[phone] or available in [place])    If after 4PM or before 7:30AM on Mon-Friday or weekend/holiday please contact general surgery for urgent matters.   Team A- [id number]   Team B- [id number]  For non-urgent matters e-mail [email or web address]    We spent 50 minutes face to face with this patient of which more than 50% of the time was spent counseling & coordinating care of this pt     Assessment/Plan: 31 y/o M with pmhx of ETOH abuse presented to the ED for worsening of cellulitis on his R leg. Labs show leukopenia and anemia. Patient also concerning for possible ETOH withdrawal. Admit for cellulitis and ETOH withdrawal.    Wound Consult requested to assist w/ management of RLE ulcer with surrounding cellulitis. Patient known to wound care services last seen in 2022 for RLU atypical trauma wound to RLE. Patient reported trauma to wound while walking from [facility] to  for days. Patient reports he is currently homeless and has no family in [place]. Reports he is interested in shelter placement.     RLE chronic trauma wound with surrounding cellulitis (s/p biopsy of RLE wound on  to r/o neoplasm)  (Of note, patient identified as Priscila Horton,  02-Mar-2022) previous MR number [id number])  -CT of right leg- Left lower extremity soft tissue swelling and skin thickening. No drainable fluid collection. Nonspecific but can be seen with cellulitis in the appropriate clinical setting. No OM. Remainder of findings as per primary team.   -Tissue type stable with pink granular and fibrinous tissue  -+Erythema and trace edema to periwound skin suspect surrounding cellulitis   -US duplex negative for DVT.   -Hemoglobin A1C- 5.7%  -Compression BLE  -BLE elevation  -Abx per Medicine  Moisturize intact skin w/ SWEEN moisturizer daily.   -Topical Recommendations: Clean wound and periwound skin with NS. Pat dry. Apply Betadine wipe to open ulcer, pat dry. Apply Sween 24 moisturizer to bilateral legs, avoid between toes or over open ulcer).Cover with ABD pad and Kerlix. Compress with ACE wraps. Change daily.     Alcoholism   -Management as per primary team   -Advised patient on possible associated complications, patient verbalized understanding and reports trying to " recover from that".     Continue turning and positioning w/ offloading assistive devices as per protocol  Continue w/ Pericare as per protocol  Waffle Cushion to chair when oob to chair  Continue w/ low air loss fluidized bed surface     Care as per medicine, will follow w/ you. Please reconsult if needed it.     Upon discharge f/u as outpatient at [facility]  [facility] [phone]  Seen w/ florencia Sales and D/w team  Notified ANM about different last name in chart.     Thank you for this consult  Leyla Carcamo, PATRIA-BC, GARRYN (pager #[phone] or available in [place])    If after 4PM or before 7:30AM on Mon-Friday or weekend/holiday please contact general surgery for urgent matters.   Team A- [id number]   Team B- [id number]  For non-urgent matters e-mail [email or web address]

## 2022-12-14 NOTE — PROVIDER CONTACT NOTE (OTHER) - BACKGROUND
Patient admitted for cellulitis.
patient admitted for cellulitis
Admitted for cellulitis and ETOH withdrawal
patient admitted for R leg cellulitis

## 2022-12-15 LAB
ANION GAP SERPL CALC-SCNC: 9 MMOL/L — SIGNIFICANT CHANGE UP (ref 7–14)
BUN SERPL-MCNC: 6 MG/DL — LOW (ref 7–23)
CALCIUM SERPL-MCNC: 9.5 MG/DL — SIGNIFICANT CHANGE UP (ref 8.4–10.5)
CHLORIDE SERPL-SCNC: 103 MMOL/L — SIGNIFICANT CHANGE UP (ref 98–107)
CO2 SERPL-SCNC: 27 MMOL/L — SIGNIFICANT CHANGE UP (ref 22–31)
CREAT SERPL-MCNC: 0.64 MG/DL — SIGNIFICANT CHANGE UP (ref 0.5–1.3)
EGFR: 130 ML/MIN/1.73M2 — SIGNIFICANT CHANGE UP
GLUCOSE SERPL-MCNC: 87 MG/DL — SIGNIFICANT CHANGE UP (ref 70–99)
MAGNESIUM SERPL-MCNC: 1.6 MG/DL — SIGNIFICANT CHANGE UP (ref 1.6–2.6)
PHOSPHATE SERPL-MCNC: 5.2 MG/DL — HIGH (ref 2.5–4.5)
POTASSIUM SERPL-MCNC: 4 MMOL/L — SIGNIFICANT CHANGE UP (ref 3.5–5.3)
POTASSIUM SERPL-SCNC: 4 MMOL/L — SIGNIFICANT CHANGE UP (ref 3.5–5.3)
SODIUM SERPL-SCNC: 139 MMOL/L — SIGNIFICANT CHANGE UP (ref 135–145)

## 2022-12-15 PROCEDURE — 99233 SBSQ HOSP IP/OBS HIGH 50: CPT

## 2022-12-15 RX ADMIN — Medication 1 TABLET(S): at 11:48

## 2022-12-15 RX ADMIN — SODIUM CHLORIDE 75 MILLILITER(S): 9 INJECTION, SOLUTION INTRAVENOUS at 05:09

## 2022-12-15 RX ADMIN — Medication 105 MILLIGRAM(S): at 22:42

## 2022-12-15 RX ADMIN — SODIUM CHLORIDE 75 MILLILITER(S): 9 INJECTION, SOLUTION INTRAVENOUS at 22:42

## 2022-12-15 RX ADMIN — Medication 1 MILLIGRAM(S): at 11:48

## 2022-12-15 RX ADMIN — Medication 166.67 MILLIGRAM(S): at 06:15

## 2022-12-15 RX ADMIN — Medication 166.67 MILLIGRAM(S): at 18:00

## 2022-12-15 RX ADMIN — Medication 105 MILLIGRAM(S): at 05:10

## 2022-12-15 RX ADMIN — Medication 105 MILLIGRAM(S): at 14:12

## 2022-12-15 RX ADMIN — PANTOPRAZOLE SODIUM 40 MILLIGRAM(S): 20 TABLET, DELAYED RELEASE ORAL at 05:10

## 2022-12-15 NOTE — ADVANCED PRACTICE NURSE CONSULT - REASON FOR CONSULT
Patient seen on skin care rounds for assessment of skin impairment and recommendations of topical management. As per H and P, patient is 33 y/o M with pmhx of ETOH abuse presented to the ED for worsening of cellulitis on his R leg. Labs show leukopenia and anemia. Patient also concerning for possible ETOH withdrawal. Admit for cellulitis and ETOH withdrawal.  Chart reviewed: WBC 4.80, H/H 8.7/29.2, Platelets 210, Serum albumin 2.9, A1C 5.7, BMI 32.7, Scot 20.  Patient interviewed. Patient H/O of (INSERT HISTORY). Patient admitted with (ADMISSION).  Patient seen on skin care rounds for assessment of skin impairment and recommendations of topical management. As per H and P, patient is 31 y/o M with pmhx of ETOH abuse presented to the ED for worsening of cellulitis on his R leg. Labs show leukopenia and anemia. Patient also concerning for possible ETOH withdrawal. Admit for cellulitis and ETOH withdrawal.  Chart reviewed: WBC 4.80, H/H 8.7/29.2, Platelets 210, Serum albumin 2.9, A1C 5.7, BMI 32.7, Scot 20. Patient admitted with Cellulitis.

## 2022-12-15 NOTE — ADVANCED PRACTICE NURSE CONSULT - RECOMMEDATIONS
Topical recommendations:     Right LE shin with wound,- Continue topical recommendations as per wound MD: Cleanse with NS, pat dry. Apply betadine, allow to dry, cover with small Adaptic touch silicone contact layer to prevent dressing adherence. Cover with ABD, wrap with Kerlix and ACE wrap. Change dressing daily.      Plan of care discussed with patient, all questions answered to patients satisfaction and covering RN.     Please contact Wound/Ostomy Care Service Line if we can be of further assistance (ext [phone]).

## 2022-12-15 NOTE — ADVANCED PRACTICE NURSE CONSULT - ASSESSMENT
General: Patient is A&Ox4, ambulatory. Skin warm, dry, with scattered areas of hyperpigmentation and hypopigmentation to B/L LE, Heels intact.     Right LE shin with wound, possibly due to trauma when patient was intoxicated. Complicated by venous insufficiency and cellulitis. Difficult to remove dry gauze noted attached to the wound. NS used to detach gauze, revealing wound, measuring 0.5cm x 1cm x 0.25cm, 100% granulation, friable. Small serosanguinous drainage noted. Periwound with hyperpigmentation and hypopigmentation, hemosiderin stains, edema. No increased warmth, no erythema, no induration noted at this time.  General: Patient is A&Ox4, ambulatory. Skin warm, dry, with scattered areas of hyperpigmentation and hypopigmentation to B/L LE, Heels intact.     Right LE shin with wound, possibly due to trauma when patient was intoxicated. Complicated by venous insufficiency and cellulitis. Difficult to remove dry gauze noted attached to the wound. NS used to detach gauze, revealing wound, measuring 0.5cm x 1cm x 0.25cm, 100% granulation, friable. Small serosanguinous drainage noted. Periwound with hyperpigmentation and hypopigmentation, hemosiderin stains, edema. No increased warmth, no erythema, no induration noted at this time. Patient c/o tenderness on palpation.

## 2022-12-16 LAB
ANION GAP SERPL CALC-SCNC: 12 MMOL/L — SIGNIFICANT CHANGE UP (ref 7–14)
BUN SERPL-MCNC: 8 MG/DL — SIGNIFICANT CHANGE UP (ref 7–23)
CALCIUM SERPL-MCNC: 9 MG/DL — SIGNIFICANT CHANGE UP (ref 8.4–10.5)
CHLORIDE SERPL-SCNC: 99 MMOL/L — SIGNIFICANT CHANGE UP (ref 98–107)
CO2 SERPL-SCNC: 23 MMOL/L — SIGNIFICANT CHANGE UP (ref 22–31)
CREAT SERPL-MCNC: 0.63 MG/DL — SIGNIFICANT CHANGE UP (ref 0.5–1.3)
EGFR: 130 ML/MIN/1.73M2 — SIGNIFICANT CHANGE UP
GLUCOSE SERPL-MCNC: 101 MG/DL — HIGH (ref 70–99)
HCT VFR BLD CALC: 29 % — LOW (ref 39–50)
HGB BLD-MCNC: 8.7 G/DL — LOW (ref 13–17)
MAGNESIUM SERPL-MCNC: 1.4 MG/DL — LOW (ref 1.6–2.6)
MCHC RBC-ENTMCNC: 23.1 PG — LOW (ref 27–34)
MCHC RBC-ENTMCNC: 30 GM/DL — LOW (ref 32–36)
MCV RBC AUTO: 77.1 FL — LOW (ref 80–100)
NRBC # BLD: 0 /100 WBCS — SIGNIFICANT CHANGE UP (ref 0–0)
NRBC # FLD: 0 K/UL — SIGNIFICANT CHANGE UP (ref 0–0)
PHOSPHATE SERPL-MCNC: 5 MG/DL — HIGH (ref 2.5–4.5)
PLATELET # BLD AUTO: 189 K/UL — SIGNIFICANT CHANGE UP (ref 150–400)
POTASSIUM SERPL-MCNC: 3.3 MMOL/L — LOW (ref 3.5–5.3)
POTASSIUM SERPL-SCNC: 3.3 MMOL/L — LOW (ref 3.5–5.3)
RBC # BLD: 3.76 M/UL — LOW (ref 4.2–5.8)
RBC # FLD: 20.8 % — HIGH (ref 10.3–14.5)
SODIUM SERPL-SCNC: 134 MMOL/L — LOW (ref 135–145)
VANCOMYCIN TROUGH SERPL-MCNC: 6.5 UG/ML — LOW (ref 10–20)
WBC # BLD: 4.56 K/UL — SIGNIFICANT CHANGE UP (ref 3.8–10.5)
WBC # FLD AUTO: 4.56 K/UL — SIGNIFICANT CHANGE UP (ref 3.8–10.5)

## 2022-12-16 PROCEDURE — 99231 SBSQ HOSP IP/OBS SF/LOW 25: CPT

## 2022-12-16 RX ORDER — POTASSIUM CHLORIDE 20 MEQ
40 PACKET (EA) ORAL EVERY 4 HOURS
Refills: 0 | Status: COMPLETED | OUTPATIENT
Start: 2022-12-16 | End: 2022-12-16

## 2022-12-16 RX ORDER — MAGNESIUM OXIDE 400 MG ORAL TABLET 241.3 MG
400 TABLET ORAL
Refills: 0 | Status: COMPLETED | OUTPATIENT
Start: 2022-12-16 | End: 2022-12-16

## 2022-12-16 RX ADMIN — Medication 40 MILLIEQUIVALENT(S): at 08:44

## 2022-12-16 RX ADMIN — MAGNESIUM OXIDE 400 MG ORAL TABLET 400 MILLIGRAM(S): 241.3 TABLET ORAL at 11:55

## 2022-12-16 RX ADMIN — MAGNESIUM OXIDE 400 MG ORAL TABLET 400 MILLIGRAM(S): 241.3 TABLET ORAL at 08:44

## 2022-12-16 RX ADMIN — Medication 100 MILLIGRAM(S): at 11:55

## 2022-12-16 RX ADMIN — Medication 325 MILLIGRAM(S): at 08:44

## 2022-12-16 RX ADMIN — Medication 1 TABLET(S): at 11:55

## 2022-12-16 RX ADMIN — Medication 1 MILLIGRAM(S): at 11:55

## 2022-12-16 RX ADMIN — Medication 100 MILLIGRAM(S): at 18:02

## 2022-12-16 RX ADMIN — Medication 40 MILLIEQUIVALENT(S): at 11:55

## 2022-12-16 RX ADMIN — SODIUM CHLORIDE 75 MILLILITER(S): 9 INJECTION, SOLUTION INTRAVENOUS at 11:55

## 2022-12-16 RX ADMIN — MAGNESIUM OXIDE 400 MG ORAL TABLET 400 MILLIGRAM(S): 241.3 TABLET ORAL at 18:02

## 2022-12-16 RX ADMIN — PANTOPRAZOLE SODIUM 40 MILLIGRAM(S): 20 TABLET, DELAYED RELEASE ORAL at 08:15

## 2022-12-17 LAB
ALBUMIN SERPL ELPH-MCNC: 3.2 G/DL — LOW (ref 3.3–5)
ALP SERPL-CCNC: 118 U/L — SIGNIFICANT CHANGE UP (ref 40–120)
ALT FLD-CCNC: 15 U/L — SIGNIFICANT CHANGE UP (ref 4–41)
ANION GAP SERPL CALC-SCNC: 12 MMOL/L — SIGNIFICANT CHANGE UP (ref 7–14)
AST SERPL-CCNC: 38 U/L — SIGNIFICANT CHANGE UP (ref 4–40)
BASOPHILS # BLD AUTO: 0.05 K/UL — SIGNIFICANT CHANGE UP (ref 0–0.2)
BASOPHILS NFR BLD AUTO: 1.1 % — SIGNIFICANT CHANGE UP (ref 0–2)
BILIRUB SERPL-MCNC: 0.6 MG/DL — SIGNIFICANT CHANGE UP (ref 0.2–1.2)
BUN SERPL-MCNC: 10 MG/DL — SIGNIFICANT CHANGE UP (ref 7–23)
CALCIUM SERPL-MCNC: 9.3 MG/DL — SIGNIFICANT CHANGE UP (ref 8.4–10.5)
CHLORIDE SERPL-SCNC: 100 MMOL/L — SIGNIFICANT CHANGE UP (ref 98–107)
CO2 SERPL-SCNC: 22 MMOL/L — SIGNIFICANT CHANGE UP (ref 22–31)
CREAT SERPL-MCNC: 0.61 MG/DL — SIGNIFICANT CHANGE UP (ref 0.5–1.3)
EGFR: 132 ML/MIN/1.73M2 — SIGNIFICANT CHANGE UP
EOSINOPHIL # BLD AUTO: 0.43 K/UL — SIGNIFICANT CHANGE UP (ref 0–0.5)
EOSINOPHIL NFR BLD AUTO: 9.6 % — HIGH (ref 0–6)
GLUCOSE SERPL-MCNC: 90 MG/DL — SIGNIFICANT CHANGE UP (ref 70–99)
HCT VFR BLD CALC: 29 % — LOW (ref 39–50)
HGB BLD-MCNC: 8.8 G/DL — LOW (ref 13–17)
IANC: 2.37 K/UL — SIGNIFICANT CHANGE UP (ref 1.8–7.4)
IMM GRANULOCYTES NFR BLD AUTO: 0.2 % — SIGNIFICANT CHANGE UP (ref 0–0.9)
LYMPHOCYTES # BLD AUTO: 1.08 K/UL — SIGNIFICANT CHANGE UP (ref 1–3.3)
LYMPHOCYTES # BLD AUTO: 24.1 % — SIGNIFICANT CHANGE UP (ref 13–44)
MAGNESIUM SERPL-MCNC: 1.5 MG/DL — LOW (ref 1.6–2.6)
MCHC RBC-ENTMCNC: 23.7 PG — LOW (ref 27–34)
MCHC RBC-ENTMCNC: 30.3 GM/DL — LOW (ref 32–36)
MCV RBC AUTO: 78 FL — LOW (ref 80–100)
MONOCYTES # BLD AUTO: 0.54 K/UL — SIGNIFICANT CHANGE UP (ref 0–0.9)
MONOCYTES NFR BLD AUTO: 12.1 % — SIGNIFICANT CHANGE UP (ref 2–14)
NEUTROPHILS # BLD AUTO: 2.37 K/UL — SIGNIFICANT CHANGE UP (ref 1.8–7.4)
NEUTROPHILS NFR BLD AUTO: 52.9 % — SIGNIFICANT CHANGE UP (ref 43–77)
NRBC # BLD: 0 /100 WBCS — SIGNIFICANT CHANGE UP (ref 0–0)
NRBC # FLD: 0 K/UL — SIGNIFICANT CHANGE UP (ref 0–0)
PHOSPHATE SERPL-MCNC: 4.4 MG/DL — SIGNIFICANT CHANGE UP (ref 2.5–4.5)
PLATELET # BLD AUTO: 164 K/UL — SIGNIFICANT CHANGE UP (ref 150–400)
POTASSIUM SERPL-MCNC: 3.6 MMOL/L — SIGNIFICANT CHANGE UP (ref 3.5–5.3)
POTASSIUM SERPL-SCNC: 3.6 MMOL/L — SIGNIFICANT CHANGE UP (ref 3.5–5.3)
PROT SERPL-MCNC: 8.4 G/DL — HIGH (ref 6–8.3)
RBC # BLD: 3.72 M/UL — LOW (ref 4.2–5.8)
RBC # FLD: 21.1 % — HIGH (ref 10.3–14.5)
SODIUM SERPL-SCNC: 134 MMOL/L — LOW (ref 135–145)
WBC # BLD: 4.48 K/UL — SIGNIFICANT CHANGE UP (ref 3.8–10.5)
WBC # FLD AUTO: 4.48 K/UL — SIGNIFICANT CHANGE UP (ref 3.8–10.5)

## 2022-12-17 PROCEDURE — 99232 SBSQ HOSP IP/OBS MODERATE 35: CPT

## 2022-12-17 RX ORDER — MAGNESIUM OXIDE 400 MG ORAL TABLET 241.3 MG
400 TABLET ORAL
Refills: 0 | Status: COMPLETED | OUTPATIENT
Start: 2022-12-17 | End: 2022-12-17

## 2022-12-17 RX ADMIN — MAGNESIUM OXIDE 400 MG ORAL TABLET 400 MILLIGRAM(S): 241.3 TABLET ORAL at 11:56

## 2022-12-17 RX ADMIN — MAGNESIUM OXIDE 400 MG ORAL TABLET 400 MILLIGRAM(S): 241.3 TABLET ORAL at 11:57

## 2022-12-17 RX ADMIN — SODIUM CHLORIDE 75 MILLILITER(S): 9 INJECTION, SOLUTION INTRAVENOUS at 11:56

## 2022-12-17 RX ADMIN — Medication 100 MILLIGRAM(S): at 17:02

## 2022-12-17 RX ADMIN — Medication 100 MILLIGRAM(S): at 11:56

## 2022-12-17 RX ADMIN — Medication 1 MILLIGRAM(S): at 11:56

## 2022-12-17 RX ADMIN — PANTOPRAZOLE SODIUM 40 MILLIGRAM(S): 20 TABLET, DELAYED RELEASE ORAL at 05:10

## 2022-12-17 RX ADMIN — MAGNESIUM OXIDE 400 MG ORAL TABLET 400 MILLIGRAM(S): 241.3 TABLET ORAL at 17:02

## 2022-12-17 RX ADMIN — Medication 1 TABLET(S): at 11:56

## 2022-12-17 RX ADMIN — Medication 100 MILLIGRAM(S): at 05:10

## 2022-12-18 LAB
ALBUMIN SERPL ELPH-MCNC: 3.4 G/DL — SIGNIFICANT CHANGE UP (ref 3.3–5)
ALP SERPL-CCNC: 123 U/L — HIGH (ref 40–120)
ALT FLD-CCNC: 21 U/L — SIGNIFICANT CHANGE UP (ref 4–41)
ANION GAP SERPL CALC-SCNC: 12 MMOL/L — SIGNIFICANT CHANGE UP (ref 7–14)
AST SERPL-CCNC: 41 U/L — HIGH (ref 4–40)
BASOPHILS # BLD AUTO: 0.05 K/UL — SIGNIFICANT CHANGE UP (ref 0–0.2)
BASOPHILS NFR BLD AUTO: 1 % — SIGNIFICANT CHANGE UP (ref 0–2)
BILIRUB SERPL-MCNC: 0.8 MG/DL — SIGNIFICANT CHANGE UP (ref 0.2–1.2)
BUN SERPL-MCNC: 11 MG/DL — SIGNIFICANT CHANGE UP (ref 7–23)
CALCIUM SERPL-MCNC: 9.2 MG/DL — SIGNIFICANT CHANGE UP (ref 8.4–10.5)
CHLORIDE SERPL-SCNC: 101 MMOL/L — SIGNIFICANT CHANGE UP (ref 98–107)
CO2 SERPL-SCNC: 23 MMOL/L — SIGNIFICANT CHANGE UP (ref 22–31)
CREAT SERPL-MCNC: 0.64 MG/DL — SIGNIFICANT CHANGE UP (ref 0.5–1.3)
CULTURE RESULTS: SIGNIFICANT CHANGE UP
CULTURE RESULTS: SIGNIFICANT CHANGE UP
EGFR: 130 ML/MIN/1.73M2 — SIGNIFICANT CHANGE UP
EOSINOPHIL # BLD AUTO: 0.49 K/UL — SIGNIFICANT CHANGE UP (ref 0–0.5)
EOSINOPHIL NFR BLD AUTO: 10.3 % — HIGH (ref 0–6)
GLUCOSE SERPL-MCNC: 76 MG/DL — SIGNIFICANT CHANGE UP (ref 70–99)
HCT VFR BLD CALC: 29.8 % — LOW (ref 39–50)
HGB BLD-MCNC: 9 G/DL — LOW (ref 13–17)
IANC: 2.4 K/UL — SIGNIFICANT CHANGE UP (ref 1.8–7.4)
IMM GRANULOCYTES NFR BLD AUTO: 0.2 % — SIGNIFICANT CHANGE UP (ref 0–0.9)
LYMPHOCYTES # BLD AUTO: 1.23 K/UL — SIGNIFICANT CHANGE UP (ref 1–3.3)
LYMPHOCYTES # BLD AUTO: 25.7 % — SIGNIFICANT CHANGE UP (ref 13–44)
MAGNESIUM SERPL-MCNC: 1.6 MG/DL — SIGNIFICANT CHANGE UP (ref 1.6–2.6)
MCHC RBC-ENTMCNC: 23.9 PG — LOW (ref 27–34)
MCHC RBC-ENTMCNC: 30.2 GM/DL — LOW (ref 32–36)
MCV RBC AUTO: 79 FL — LOW (ref 80–100)
MONOCYTES # BLD AUTO: 0.6 K/UL — SIGNIFICANT CHANGE UP (ref 0–0.9)
MONOCYTES NFR BLD AUTO: 12.6 % — SIGNIFICANT CHANGE UP (ref 2–14)
NEUTROPHILS # BLD AUTO: 2.4 K/UL — SIGNIFICANT CHANGE UP (ref 1.8–7.4)
NEUTROPHILS NFR BLD AUTO: 50.2 % — SIGNIFICANT CHANGE UP (ref 43–77)
NRBC # BLD: 0 /100 WBCS — SIGNIFICANT CHANGE UP (ref 0–0)
NRBC # FLD: 0 K/UL — SIGNIFICANT CHANGE UP (ref 0–0)
PHOSPHATE SERPL-MCNC: 5.4 MG/DL — HIGH (ref 2.5–4.5)
PLATELET # BLD AUTO: 152 K/UL — SIGNIFICANT CHANGE UP (ref 150–400)
POTASSIUM SERPL-MCNC: 3.4 MMOL/L — LOW (ref 3.5–5.3)
POTASSIUM SERPL-SCNC: 3.4 MMOL/L — LOW (ref 3.5–5.3)
PROT SERPL-MCNC: 8.7 G/DL — HIGH (ref 6–8.3)
RBC # BLD: 3.77 M/UL — LOW (ref 4.2–5.8)
RBC # FLD: 21.9 % — HIGH (ref 10.3–14.5)
SARS-COV-2 RNA SPEC QL NAA+PROBE: SIGNIFICANT CHANGE UP
SODIUM SERPL-SCNC: 136 MMOL/L — SIGNIFICANT CHANGE UP (ref 135–145)
SPECIMEN SOURCE: SIGNIFICANT CHANGE UP
SPECIMEN SOURCE: SIGNIFICANT CHANGE UP
WBC # BLD: 4.78 K/UL — SIGNIFICANT CHANGE UP (ref 3.8–10.5)
WBC # FLD AUTO: 4.78 K/UL — SIGNIFICANT CHANGE UP (ref 3.8–10.5)

## 2022-12-18 PROCEDURE — 99232 SBSQ HOSP IP/OBS MODERATE 35: CPT

## 2022-12-18 RX ORDER — POTASSIUM CHLORIDE 20 MEQ
40 PACKET (EA) ORAL ONCE
Refills: 0 | Status: COMPLETED | OUTPATIENT
Start: 2022-12-18 | End: 2022-12-18

## 2022-12-18 RX ADMIN — PANTOPRAZOLE SODIUM 40 MILLIGRAM(S): 20 TABLET, DELAYED RELEASE ORAL at 05:09

## 2022-12-18 RX ADMIN — SODIUM CHLORIDE 75 MILLILITER(S): 9 INJECTION, SOLUTION INTRAVENOUS at 14:11

## 2022-12-18 RX ADMIN — Medication 40 MILLIEQUIVALENT(S): at 14:10

## 2022-12-18 RX ADMIN — Medication 100 MILLIGRAM(S): at 05:09

## 2022-12-18 RX ADMIN — Medication 100 MILLIGRAM(S): at 13:35

## 2022-12-18 RX ADMIN — Medication 1 MILLIGRAM(S): at 13:35

## 2022-12-18 RX ADMIN — Medication 1 TABLET(S): at 13:35

## 2022-12-18 RX ADMIN — Medication 100 MILLIGRAM(S): at 17:03

## 2022-12-19 ENCOUNTER — TRANSCRIPTION ENCOUNTER (OUTPATIENT)
Age: 32
End: 2022-12-19

## 2022-12-19 VITALS
HEART RATE: 77 BPM | RESPIRATION RATE: 16 BRPM | SYSTOLIC BLOOD PRESSURE: 118 MMHG | DIASTOLIC BLOOD PRESSURE: 62 MMHG | OXYGEN SATURATION: 100 % | TEMPERATURE: 99 F

## 2022-12-19 PROBLEM — F10.10 ALCOHOL ABUSE, UNCOMPLICATED: Chronic | Status: ACTIVE | Noted: 2022-12-13

## 2022-12-19 PROBLEM — Z00.00 ENCOUNTER FOR PREVENTIVE HEALTH EXAMINATION: Status: ACTIVE | Noted: 2022-12-19

## 2022-12-19 PROCEDURE — 99239 HOSP IP/OBS DSCHRG MGMT >30: CPT

## 2022-12-19 RX ORDER — FOLIC ACID 0.8 MG
1 TABLET ORAL
Qty: 30 | Refills: 0
Start: 2022-12-19 | End: 2023-01-17

## 2022-12-19 RX ORDER — FERROUS SULFATE 325(65) MG
1 TABLET ORAL
Qty: 13 | Refills: 0
Start: 2022-12-19 | End: 2023-01-17

## 2022-12-19 RX ORDER — THIAMINE MONONITRATE (VIT B1) 100 MG
1 TABLET ORAL
Qty: 30 | Refills: 0
Start: 2022-12-19 | End: 2023-01-17

## 2022-12-19 RX ORDER — PANTOPRAZOLE SODIUM 20 MG/1
1 TABLET, DELAYED RELEASE ORAL
Qty: 30 | Refills: 0
Start: 2022-12-19 | End: 2023-01-17

## 2022-12-19 RX ADMIN — Medication 325 MILLIGRAM(S): at 09:00

## 2022-12-19 RX ADMIN — Medication 1 TABLET(S): at 12:10

## 2022-12-19 RX ADMIN — Medication 100 MILLIGRAM(S): at 16:53

## 2022-12-19 RX ADMIN — PANTOPRAZOLE SODIUM 40 MILLIGRAM(S): 20 TABLET, DELAYED RELEASE ORAL at 06:44

## 2022-12-19 RX ADMIN — Medication 1 MILLIGRAM(S): at 12:10

## 2022-12-19 RX ADMIN — Medication 100 MILLIGRAM(S): at 06:44

## 2022-12-19 RX ADMIN — Medication 100 MILLIGRAM(S): at 12:10

## 2022-12-19 NOTE — PROGRESS NOTE ADULT - PROBLEM SELECTOR PLAN 1
Assessment:  - patient presented for cellulitis of the R leg  - labs significant for leukopenia, but can be attributed to myelosuppression from chronic ETOH use  - physical exam shows a poorly kempt leg with crusted purulence on the R shin  - x-ray of R leg - Diffuse lower extremity soft tissue edema.    Plan:  PO doxy  doppler neg  - A1c  5.6  - blood cultures pending  - wound care consult apprec  - CT R leg because of chronic and prolonged cellulitis to r/o abscess-- no evidence of abscess or osteo
Assessment:  - patient presented for cellulitis of the R leg  - labs significant for leukopenia, but can be attributed to myelosuppression from chronic ETOH use  - physical exam shows a poorly kempt leg with crusted purulence on the R shin  - x-ray of R leg - Diffuse lower extremity soft tissue edema.    Plan:  PO doxy  doppler neg  - A1c  5.6  - blood cultures pending  - wound care consult apprec  - CT R leg because of chronic and prolonged cellulitis to r/o abscess-- no evidence of abscess or osteo
Assessment:  - patient presented for cellulitis of the R leg  - labs significant for leukopenia, but can be attributed to myelosuppression from chronic ETOH use  - physical exam shows a poorly kempt leg with crusted purulence on the R shin  - x-ray of R leg - Diffuse lower extremity soft tissue edema.    Plan:  - c/w vanco, dc'd cefazolin  doppler neg  - A1c only 5.6  - blood cultures pending  - wound care consult apprec  - CT R leg because of chronic and prolonged cellulitis to r/o abscess-- no evidence of abscess or osteo
Assessment:  - patient presented for cellulitis of the R leg  - labs significant for leukopenia, but can be attributed to myelosuppression from chronic ETOH use  - physical exam shows a poorly kempt leg with crusted purulence on the R shin  - x-ray of R leg - Diffuse lower extremity soft tissue edema.    Plan:  PO doxy for a total of 10 days   doppler neg  - A1c  5.6  - blood cultures NGTD   - wound care consult apprec  - CT R leg because of chronic and prolonged cellulitis to r/o abscess-- no evidence of abscess or osteo
Assessment:  - patient presented for cellulitis of the R leg  - labs significant for leukopenia, but can be attributed to myelosuppression from chronic ETOH use  - physical exam shows a poorly kempt leg with crusted purulence on the R shin  - x-ray of R leg - Diffuse lower extremity soft tissue edema.    Plan:  - c/w vanco, dc'kayli cefazolin  - does need LE doppler of the RLE to r/o DVT [ ]  - A1c only 5.6  - blood cultures pending  - wound care consult  - CT R leg because of chronic and prolonged cellulitis to r/o abscess-- no evidence of abscess or osteo
Assessment:  - patient presented for cellulitis of the R leg  - labs significant for leukopenia, but can be attributed to myelosuppression from chronic ETOH use  - physical exam shows a poorly kempt leg with crusted purulence on the R shin  - x-ray of R leg - Diffuse lower extremity soft tissue edema.    Plan:  - c/w vanco, dc'd cefazolin  doppler neg  - A1c only 5.6  - blood cultures pending  - wound care consult apprec  - CT R leg because of chronic and prolonged cellulitis to r/o abscess-- no evidence of abscess or osteo

## 2022-12-19 NOTE — DISCHARGE NOTE NURSING/CASE MANAGEMENT/SOCIAL WORK - PATIENT PORTAL LINK FT
You can access the FollowMyHealth Patient Portal offered by [facility] by registering at the following website: [email or web address]. By joining [facility]’s FollowMyHealth portal, you will also be able to view your health information using other applications (apps) compatible with our system.

## 2022-12-19 NOTE — DISCHARGE NOTE PROVIDER - NSDCMRMEDTOKEN_GEN_ALL_CORE_FT
doxycycline monohydrate 50 mg oral capsule: 2 cap(s) orally every 12 hours thru 12/26 AM dose  ferrous sulfate 325 mg (65 mg elemental iron) oral tablet: 1 tab(s) orally 3 times a week every Mon/Wed/Fri  folic acid 1 mg oral tablet: 1 tab(s) orally once a day  Multiple Vitamins oral tablet: 1 tab(s) orally once a day  pantoprazole 40 mg oral delayed release tablet: 1 tab(s) orally once a day (before a meal)  thiamine 100 mg oral tablet: 1 tab(s) orally once a day   doxycycline hyclate 100 mg oral capsule: 1 cap(s) orally 2 times a day thru 12/26 AM dose  ferrous sulfate 325 mg (65 mg elemental iron) oral tablet: 1 tab(s) orally 3 times a week every Mon/Wed/Fri  folic acid 1 mg oral tablet: 1 tab(s) orally once a day  Multiple Vitamins oral tablet: 1 tab(s) orally once a day  pantoprazole 40 mg oral delayed release tablet: 1 tab(s) orally once a day (before a meal)  thiamine 100 mg oral tablet: 1 tab(s) orally once a day

## 2022-12-19 NOTE — PROGRESS NOTE ADULT - SUBJECTIVE AND OBJECTIVE BOX
[facility] Division of Hospital Medicine  Wendy Harrison MD  Pager [phone]    Patient is a 32y old  Male who presents with a chief complaint of cellulitis      SUBJECTIVE / OVERNIGHT EVENTS: reports pain to R ankle, hurts with walking sometimes      MEDICATIONS  (STANDING):  doxycycline monohydrate Capsule 100 milliGRAM(s) Oral every 12 hours  ferrous    sulfate 325 milliGRAM(s) Oral <User Schedule>  folic acid 1 milliGRAM(s) Oral daily  influenza   Vaccine 0.5 milliLiter(s) IntraMuscular once  lactated ringers. 1000 milliLiter(s) (75 mL/Hr) IV Continuous <Continuous>  multivitamin 1 Tablet(s) Oral daily  pantoprazole    Tablet 40 milliGRAM(s) Oral before breakfast  thiamine 100 milliGRAM(s) Oral daily    MEDICATIONS  (PRN):  acetaminophen     Tablet .. 650 milliGRAM(s) Oral every 6 hours PRN Temp greater or equal to 38C (100.4F), Mild Pain (1 - 3)  aluminum hydroxide/magnesium hydroxide/simethicone Suspension 30 milliLiter(s) Oral every 4 hours PRN Dyspepsia  melatonin 3 milliGRAM(s) Oral at bedtime PRN Insomnia      CAPILLARY BLOOD GLUCOSE        I&O's Summary      PHYSICAL EXAM:  Vital Signs Last 24 Hrs  T(F): 98.7 (18 Dec 2022 04:55), Max: 98.9 (17 Dec 2022 16:56)  HR: 70 (18 Dec 2022 04:55) (70 - 90)  BP: 103/55 (18 Dec 2022 04:55) (103/55 - 132/62)  RR: 18 (18 Dec 2022 04:55) (17 - 18)  SpO2: 97% (18 Dec 2022 04:55) (96% - 98%)    Parameters below as of 18 Dec 2022 04:55  Patient On (Oxygen Delivery Method): room air        CONSTITUTIONAL: NAD, appears comfortable  EYES: PERRLA; conjunctiva and sclera clear  ENMT: Moist oral mucosa; normal dentition  RESPIRATORY: Normal respiratory effort; grossly b/l AE  CARDIOVASCULAR: Regular rate and rhythm; No lower extremity edema;   ABDOMEN: Nontender to palpation, normoactive bowel sounds  MUSCULOSKELETAL:   no clubbing or cyanosis of digits; no joint swelling or tenderness to palpation  PSYCH: poor historian; affect appropriate  NEUROLOGY: CN 2-12 are intact and symmetric; no gross sensory deficits   SKIN: R LE dressing c/d/i    LABS:                        9.0    4.78  )-----------( 152      ( 18 Dec 2022 06:07 )             29.8     12-18    136  |  101  |  11  ----------------------------<  76  3.4<L>   |  23  |  0.64    Ca    9.2      18 Dec 2022 06:07  Phos  5.4     12-18  Mg     1.60     12-18    TPro  8.7<H>  /  Alb  3.4  /  TBili  0.8  /  DBili  x   /  AST  41<H>  /  ALT  21  /  AlkPhos  123<H>  12-18          
[facility] Division of Hospital Medicine  Wendy Harrison MD  Pager [phone]    Patient is a 32y old  Male who presents with a chief complaint of cellulitis       SUBJECTIVE / OVERNIGHT EVENTS: initially reports pain to foot, then said no pain; pt resting comfortably and makeda PO      MEDICATIONS  (STANDING):  ferrous    sulfate 325 milliGRAM(s) Oral <User Schedule>  folic acid 1 milliGRAM(s) Oral daily  influenza   Vaccine 0.5 milliLiter(s) IntraMuscular once  lactated ringers. 1000 milliLiter(s) (75 mL/Hr) IV Continuous <Continuous>  multivitamin 1 Tablet(s) Oral daily  pantoprazole    Tablet 40 milliGRAM(s) Oral before breakfast  thiamine IVPB 500 milliGRAM(s) IV Intermittent three times a day  vancomycin  IVPB 1250 milliGRAM(s) IV Intermittent every 12 hours    MEDICATIONS  (PRN):  acetaminophen     Tablet .. 650 milliGRAM(s) Oral every 6 hours PRN Temp greater or equal to 38C (100.4F), Mild Pain (1 - 3)  aluminum hydroxide/magnesium hydroxide/simethicone Suspension 30 milliLiter(s) Oral every 4 hours PRN Dyspepsia  LORazepam     Tablet 2 milliGRAM(s) Oral every 2 hours PRN Symptom-triggered 2 point increase in CIWA-Ar  melatonin 3 milliGRAM(s) Oral at bedtime PRN Insomnia    PHYSICAL EXAM:  Vital Signs Last 24 Hrs  T(F): 99.6 (15 Dec 2022 05:00), Max: 99.6 (15 Dec 2022 05:00)  HR: 77 (15 Dec 2022 05:00) (77 - 90)  BP: 123/72 (15 Dec 2022 05:00) (122/75 - 129/74)  RR: 17 (15 Dec 2022 05:00) (17 - 18)  SpO2: 97% (15 Dec 2022 05:00) (97% - 99%)    Parameters below as of 15 Dec 2022 05:00  Patient On (Oxygen Delivery Method): room air        CONSTITUTIONAL: NAD, appears comfortable  EYES: PERRLA; conjunctiva and sclera clear  ENMT: Moist oral mucosa; normal dentition  NECK: Supple, no palpable masses; no thyromegaly  RESPIRATORY: Normal respiratory effort; b/l AE  CARDIOVASCULAR: Regular rate and rhythm; No lower extremity edema;   ABDOMEN: Nontender to palpation, normoactive bowel sounds  MUSCULOSKELETAL:  No clubbing or cyanosis of digits; no joint swelling or tenderness to palpation  PSYCH: calm, coop; poor historian  NEUROLOGY: CN 2-12 are intact and symmetric; no gross sensory deficits   SKIN: RLE with dressing c/d/i  LABS:                        8.7    4.80  )-----------( 210      ( 14 Dec 2022 05:00 )             29.2     12-15    139  |  103  |  6<L>  ----------------------------<  87  4.0   |  27  |  0.64    Ca    9.5      15 Dec 2022 11:25  Phos  5.2     12-15  Mg     1.60     12-15    TPro  7.9  /  Alb  2.9<L>  /  TBili  1.0  /  DBili  x   /  AST  38  /  ALT  16  /  AlkPhos  116  12-14              Culture - Blood (collected 12 Dec 2022 22:40)  Source: .Blood Blood-Peripheral  Preliminary Report (14 Dec 2022 03:01):    No growth to date.    Culture - Blood (collected 12 Dec 2022 22:13)  Source: .Blood Blood-Peripheral  Preliminary Report (14 Dec 2022 03:01):    No growth to date.  
Patient is a 32y old  Male who presents with a chief complaint of cellulitis (19 Dec 2022 10:13)      SUBJECTIVE / OVERNIGHT EVENTS: patient seen and examined by bedside, pain controlled, denies headache, dizziness, SOB, CP, Palpitations , N/V/D, abdominal pain        MEDICATIONS  (STANDING):  doxycycline monohydrate Capsule 100 milliGRAM(s) Oral every 12 hours  ferrous    sulfate 325 milliGRAM(s) Oral <User Schedule>  folic acid 1 milliGRAM(s) Oral daily  influenza   Vaccine 0.5 milliLiter(s) IntraMuscular once  multivitamin 1 Tablet(s) Oral daily  pantoprazole    Tablet 40 milliGRAM(s) Oral before breakfast  thiamine 100 milliGRAM(s) Oral daily    MEDICATIONS  (PRN):  acetaminophen     Tablet .. 650 milliGRAM(s) Oral every 6 hours PRN Temp greater or equal to 38C (100.4F), Mild Pain (1 - 3)  aluminum hydroxide/magnesium hydroxide/simethicone Suspension 30 milliLiter(s) Oral every 4 hours PRN Dyspepsia  melatonin 3 milliGRAM(s) Oral at bedtime PRN Insomnia      Vital Signs Last 24 Hrs  T(C): 36.6 (19 Dec 2022 12:00), Max: 37.2 (18 Dec 2022 23:02)  T(F): 97.9 (19 Dec 2022 12:00), Max: 99 (18 Dec 2022 23:02)  HR: 79 (19 Dec 2022 12:00) (79 - 88)  BP: 117/77 (19 Dec 2022 12:00) (111/56 - 120/64)  BP(mean): --  RR: 16 (19 Dec 2022 12:00) (16 - 18)  SpO2: 100% (19 Dec 2022 12:00) (97% - 100%)    Parameters below as of 19 Dec 2022 12:00  Patient On (Oxygen Delivery Method): room air      CAPILLARY BLOOD GLUCOSE        I&O's Summary      PHYSICAL EXAM:  CONSTITUTIONAL: NAD, appears comfortable  EYES: PERRLA; conjunctiva and sclera clear  ENMT: Moist oral mucosa; normal dentition  RESPIRATORY: Normal respiratory effort; grossly b/l AE  CARDIOVASCULAR: Regular rate and rhythm; No lower extremity edema;   ABDOMEN: Nontender to palpation, normoactive bowel sounds  MUSCULOSKELETAL:   no clubbing or cyanosis of digits; no joint swelling or tenderness to palpation  PSYCH: poor historian; affect appropriate  NEUROLOGY: CN 2-12 are intact and symmetric; no gross sensory deficits   SKIN: R LE dressing c/d/i        LABS:                        9.0    4.78  )-----------( 152      ( 18 Dec 2022 06:07 )             29.8     12-18    136  |  101  |  11  ----------------------------<  76  3.4<L>   |  23  |  0.64    Ca    9.2      18 Dec 2022 06:07  Phos  5.4     12-18  Mg     1.60     12-18    TPro  8.7<H>  /  Alb  3.4  /  TBili  0.8  /  DBili  x   /  AST  41<H>  /  ALT  21  /  AlkPhos  123<H>  12-18              RADIOLOGY & ADDITIONAL TESTS:    Imaging Personally Reviewed:    Consultant(s) Notes Reviewed:      Care Discussed with Consultants/Other Providers:  
[facility] Division of Hospital Medicine  Wendy Harrison MD  Pager [phone]    Patient is a 32y old  Male who presents with a chief complaint of cellulitis      SUBJECTIVE / OVERNIGHT EVENTS: pt has used another name Francois in previous admission; reports difficulty walking with R foot pain      MEDICATIONS  (STANDING):  doxycycline monohydrate Capsule 100 milliGRAM(s) Oral every 12 hours  ferrous    sulfate 325 milliGRAM(s) Oral <User Schedule>  folic acid 1 milliGRAM(s) Oral daily  influenza   Vaccine 0.5 milliLiter(s) IntraMuscular once  lactated ringers. 1000 milliLiter(s) (75 mL/Hr) IV Continuous <Continuous>  magnesium oxide 400 milliGRAM(s) Oral three times a day with meals  multivitamin 1 Tablet(s) Oral daily  pantoprazole    Tablet 40 milliGRAM(s) Oral before breakfast  thiamine 100 milliGRAM(s) Oral daily    MEDICATIONS  (PRN):  acetaminophen     Tablet .. 650 milliGRAM(s) Oral every 6 hours PRN Temp greater or equal to 38C (100.4F), Mild Pain (1 - 3)  aluminum hydroxide/magnesium hydroxide/simethicone Suspension 30 milliLiter(s) Oral every 4 hours PRN Dyspepsia  melatonin 3 milliGRAM(s) Oral at bedtime PRN Insomnia      PHYSICAL EXAM:  Vital Signs Last 24 Hrs  T(F): 99 (17 Dec 2022 05:05), Max: 99 (17 Dec 2022 05:05)  HR: 93 (17 Dec 2022 05:05) (82 - 93)  BP: 129/54 (17 Dec 2022 05:05) (116/62 - 129/61)  RR: 17 (17 Dec 2022 05:05) (17 - 18)  SpO2: 97% (17 Dec 2022 05:05) (95% - 99%)    Parameters below as of 17 Dec 2022 05:05  Patient On (Oxygen Delivery Method): room air        CONSTITUTIONAL: NAD, appears comfortable  EYES: PERRLA; conjunctiva and sclera clear  ENMT: Moist oral mucosa; normal dentition  RESPIRATORY: Normal respiratory effort; grossly b/l AE  CARDIOVASCULAR: Regular rate and rhythm; No lower extremity edema;   ABDOMEN: Nontender to palpation, normoactive bowel sounds  MUSCULOSKELETAL:  no clubbing or cyanosis of digits; no joint swelling or tenderness to palpation  PSYCH: calm, coop; affect flat  NEUROLOGY: CN 2-12 are intact and symmetric; no gross sensory deficits   SKIN: No rashes; no palpable lesions    LABS:                        8.8    4.48  )-----------( 164      ( 17 Dec 2022 06:10 )             29.0     12-17    134<L>  |  100  |  10  ----------------------------<  90  3.6   |  22  |  0.61    Ca    9.3      17 Dec 2022 06:10  Phos  4.4     12-17  Mg     1.50     12-17    TPro  8.4<H>  /  Alb  3.2<L>  /  TBili  0.6  /  DBili  x   /  AST  38  /  ALT  15  /  AlkPhos  118  12-17                
[facility] Division of Hospital Medicine  Evette Ashton MD  Pager (M-F, 8A-5P): [phone]  Other Times:  [phone]      SUBJECTIVE / OVERNIGHT EVENTS: Pt sleeping in bed, arouses to verbal stimuli. Says he has had lower extremity swelling for years (3), and is now having worsening pain and drainage. Says he last drank ETOH on 12/12 am.    MEDICATIONS  (STANDING):  ferrous    sulfate 325 milliGRAM(s) Oral <User Schedule>  folic acid 1 milliGRAM(s) Oral daily  lactated ringers. 1000 milliLiter(s) (75 mL/Hr) IV Continuous <Continuous>  multivitamin 1 Tablet(s) Oral daily  pantoprazole    Tablet 40 milliGRAM(s) Oral before breakfast  thiamine IVPB 500 milliGRAM(s) IV Intermittent three times a day  vancomycin  IVPB 1000 milliGRAM(s) IV Intermittent every 12 hours    MEDICATIONS  (PRN):  acetaminophen     Tablet .. 650 milliGRAM(s) Oral every 6 hours PRN Temp greater or equal to 38C (100.4F), Mild Pain (1 - 3)  aluminum hydroxide/magnesium hydroxide/simethicone Suspension 30 milliLiter(s) Oral every 4 hours PRN Dyspepsia  LORazepam     Tablet 2 milliGRAM(s) Oral every 2 hours PRN Symptom-triggered 2 point increase in CIWA-Ar  melatonin 3 milliGRAM(s) Oral at bedtime PRN Insomnia      I&O's Summary      PHYSICAL EXAM:  Vital Signs Last 24 Hrs  T(C): 36.8 (13 Dec 2022 12:29), Max: 37.9 (13 Dec 2022 10:28)  T(F): 98.3 (13 Dec 2022 12:29), Max: 100.3 (13 Dec 2022 10:28)  HR: 111 (13 Dec 2022 12:29) (84 - 115)  BP: 105/59 (13 Dec 2022 12:29) (100/59 - 123/68)  BP(mean): --  RR: 17 (13 Dec 2022 12:29) (16 - 20)  SpO2: 100% (13 Dec 2022 12:29) (96% - 100%)    Parameters below as of 13 Dec 2022 12:29  Patient On (Oxygen Delivery Method): room air      CONSTITUTIONAL: NAD, disheveled  EYES: PERRLA; conjunctiva and sclera clear  ENMT: Moist oral mucosa, no pharyngeal injection or exudates  RESPIRATORY: Normal respiratory effort; lungs are clear to auscultation bilaterally  CARDIOVASCULAR: sinus tach, normal S1 and S2, no murmur/rub/gallop; LE edema bilaterally, but worse on the R with 5cm excoriation, trace purulent drainage, blistering and swelling to the level of the patella with erythema. DP pulse 2+ bilaterally  ABDOMEN: Nontender to palpation, normoactive bowel sounds, no rebound/guarding  PSYCH: A+O to person, place  SKIN: No rashes; no palpable lesions    LABS:                        8.7    3.53  )-----------( 282      ( 12 Dec 2022 22:13 )             29.2     12-13    137  |  103  |  6<L>  ----------------------------<  80  3.6   |  24  |  0.48<L>    Ca    8.8      13 Dec 2022 06:10  Phos  4.6     12-13  Mg     1.70     12-13    TPro  7.9  /  Alb  2.8<L>  /  TBili  0.6  /  DBili  <0.2  /  AST  40  /  ALT  19  /  AlkPhos  131<H>  12-13                RADIOLOGY & ADDITIONAL TESTS:  Results Reviewed:   Imaging Personally Reviewed:  Electrocardiogram Personally Reviewed:    COORDINATION OF CARE:  Care Discussed with Consultants/Other Providers [Y/N]:  Prior or Outpatient Records Reviewed [Y/N]:  
[facility] Division of Hospital Medicine  Wendy Harrison MD  Pager [phone]    Patient is a 32y old  Male who presents with a chief complaint of cellulitis       SUBJECTIVE / OVERNIGHT EVENTS: pt very poor historian, spoke with pt in [place]; states he has had this wound for 3 years (different timelines reported to diff providers); makeda PO; reports pain in RLE      MEDICATIONS  (STANDING):  ferrous    sulfate 325 milliGRAM(s) Oral <User Schedule>  folic acid 1 milliGRAM(s) Oral daily  influenza   Vaccine 0.5 milliLiter(s) IntraMuscular once  lactated ringers. 1000 milliLiter(s) (75 mL/Hr) IV Continuous <Continuous>  multivitamin 1 Tablet(s) Oral daily  pantoprazole    Tablet 40 milliGRAM(s) Oral before breakfast  potassium chloride    Tablet ER 40 milliEquivalent(s) Oral every 4 hours  thiamine IVPB 500 milliGRAM(s) IV Intermittent three times a day  vancomycin  IVPB 1250 milliGRAM(s) IV Intermittent every 12 hours    MEDICATIONS  (PRN):  acetaminophen     Tablet .. 650 milliGRAM(s) Oral every 6 hours PRN Temp greater or equal to 38C (100.4F), Mild Pain (1 - 3)  aluminum hydroxide/magnesium hydroxide/simethicone Suspension 30 milliLiter(s) Oral every 4 hours PRN Dyspepsia  LORazepam     Tablet 2 milliGRAM(s) Oral every 2 hours PRN Symptom-triggered 2 point increase in CIWA-Ar  melatonin 3 milliGRAM(s) Oral at bedtime PRN Insomnia        PHYSICAL EXAM:  Vital Signs Last 24 Hrs  T(F): 98 (14 Dec 2022 13:29), Max: 100.8 (13 Dec 2022 22:00)  HR: 80 (14 Dec 2022 13:29) (80 - 110)  BP: 116/70 (14 Dec 2022 13:29) (108/65 - 129/60)  RR: 18 (14 Dec 2022 13:29) (15 - 19)  SpO2: 100% (14 Dec 2022 13:29) (95% - 100%)    Parameters below as of 14 Dec 2022 13:29  Patient On (Oxygen Delivery Method): room air        CONSTITUTIONAL: NAD, appears comfortable  EYES: PERRLA; conjunctiva and sclera clear  ENMT: Moist oral mucosa; normal dentition  RESPIRATORY: Normal respiratory effort; lungs are clear to auscultation bilaterally  CARDIOVASCULAR: Regular rate and rhythm; No lower extremity edema;  ABDOMEN: Nontender to palpation, normoactive bowel sounds  MUSCULOSKELETAL: no clubbing or cyanosis of digits; no joint swelling or tenderness to palpation  PSYCH: poor historian, calm  NEUROLOGY: CN 2-12 are intact and symmetric; no gross sensory deficits; no tremor noted  SKIN: RLE with dressing c/d/i    LABS:                        8.7    4.80  )-----------( 210      ( 14 Dec 2022 05:00 )             29.2     12-14    138  |  101  |  8   ----------------------------<  94  3.2<L>   |  25  |  0.57    Ca    9.1      14 Dec 2022 05:00  Phos  4.2     12-14  Mg     1.60     12-14    TPro  7.9  /  Alb  2.9<L>  /  TBili  1.0  /  DBili  x   /  AST  38  /  ALT  16  /  AlkPhos  116  12-14              Culture - Blood (collected 12 Dec 2022 22:40)  Source: .Blood Blood-Peripheral  Preliminary Report (14 Dec 2022 03:01):    No growth to date.    Culture - Blood (collected 12 Dec 2022 22:13)  Source: .Blood Blood-Peripheral  Preliminary Report (14 Dec 2022 03:01):    No growth to date.

## 2022-12-19 NOTE — PROGRESS NOTE ADULT - PROBLEM SELECTOR PLAN 5
- low risk for dvt

## 2022-12-19 NOTE — CHART NOTE - NSCHARTNOTEFT_GEN_A_CORE
Notified by RN that patient is febrile  and c/o chest pressure. Patient seen and assessed at the bedside. Patient is Faroese speaking , interpretation service used for translation ID [id number], Patient reports midsternal chest pain which was started 1 week ago. Reports pain is sharp and rates 4. Patient reports that  he has pain in his left arm sometimes.  Patient denies shortness of breath and dizziness. Pain is reproducible on palpation.                                                         Vital Signs Last 24 Hrs  T(C): 38.2 (12-13-22 @ 22:00), Max: 38.2 (12-13-22 @ 22:00)  T(F): 100.8 (12-13-22 @ 22:00), Max: 100.8 (12-13-22 @ 22:00)  HR: 106 (12-13-22 @ 22:00) (97 - 115)  BP: 129/60 (12-13-22 @ 22:00) (100/59 - 129/60)  RR: 16 (12-13-22 @ 22:00) (15 - 20)  SpO2: 97% (12-13-22 @ 22:00) (96% - 100%) on (O2)    General: Not in any  distress   Neurology: Awake, nonfocal, MCNEIL x 4  Respiratory: CTA B/L, No wheezing, rales, rhonchi  CV: RRR, S1S2, no murmurs, rubs or gallops  Abdominal: Soft, NT, ND +BS,   Extremities: Bilateral lower extremity edema   Psych: Oriented x 3, normal affect    Relevant labs, radiology and Medications reviewed                        8.7    3.53  )-----------( 282      ( 12 Dec 2022 22:13 )             29.2     12-13    137  |  103  |  6<L>  ----------------------------<  80  3.6   |  24  |  0.48<L>    Ca    8.8      13 Dec 2022 06:10  Phos  4.6     12-13  Mg     1.70     12-13    TPro  7.9  /  Alb  2.8<L>  /  TBili  0.6  /  DBili  <0.2  /  AST  40  /  ALT  19  /  AlkPhos  131<H>  12-13      MEDICATIONS  (STANDING):  ferrous    sulfate 325 milliGRAM(s) Oral <User Schedule>  folic acid 1 milliGRAM(s) Oral daily  influenza   Vaccine 0.5 milliLiter(s) IntraMuscular once  lactated ringers. 1000 milliLiter(s) (75 mL/Hr) IV Continuous <Continuous>  multivitamin 1 Tablet(s) Oral daily  pantoprazole    Tablet 40 milliGRAM(s) Oral before breakfast  thiamine IVPB 500 milliGRAM(s) IV Intermittent three times a day  vancomycin  IVPB 1000 milliGRAM(s) IV Intermittent every 12 hours    MEDICATIONS  (PRN):  acetaminophen     Tablet .. 650 milliGRAM(s) Oral every 6 hours PRN Temp greater or equal to 38C (100.4F), Mild Pain (1 - 3)  aluminum hydroxide/magnesium hydroxide/simethicone Suspension 30 milliLiter(s) Oral every 4 hours PRN Dyspepsia  LORazepam     Tablet 2 milliGRAM(s) Oral every 2 hours PRN Symptom-triggered 2 point increase in CIWA-Ar  melatonin 3 milliGRAM(s) Oral at bedtime PRN Insomnia      33 y/o M with pmhx of ETOH abuse presented to the ED for worsening of cellulitis on his R leg, now with chest pain.    Chest pain-  on exam. pain is reproducible on palpation  -EKG - Sinus tachycardia heart rate @101 with T wave inversion in V1, lead III and AVR.   -Tylenol for pain  -Will send troponin    Fever- Will continue vancomycin for cellulitis  -f/u blood c/s   - Tylenol PRN
[employer] [employer] COVERAGE    Pt's roommate overnight tested + for flu, pt placed on isolation for exposure and RVP sent - now resulted negative and moved to [facility].    LEE Soto PA-C  [id number]
Call received from overnight RN to report vancomycin trough of 6.5 from this morning. Patient is currently on vanco 1250mg q12 hrs.   D/w Attending brandy Alexandre to continue current dose for now.   Will follow.     HUNTER MENSAH, [facility]  Medicine [id number]
pt seen and examined by me  spoke with pt in English  feels the same  reports some discomfort in foot  eating well  CIWA 0  SW f/u for dispo as pt homeless and undocumented  pt is capable of wound care but likely to be non compliant  VSS  RLE with dressing c/d/i  CHEST non labored  change to PO doxy for 10 days  local wound care  dc planning  32 min spent with dc planning

## 2022-12-19 NOTE — PROGRESS NOTE ADULT - PROBLEM SELECTOR PLAN 3
CIWA zero  SBIRT
CIWA zero  SBIRT
Assessment:  - patient known to be a chronic drinker  - CIWA score 3    Plan:  - f/u ETOH level  - SBIRT consult  - will send lipase level  - will start CIWA protocol, will start symptomatic triggered ativan protocol for now  - low threshold for ICU for acute withdrawal if patient is not responding to ativan  - c/w multivitamin, thiamine 500mg Q8hr for 3 days then thiamine 100mg QD  - c/w maintenance fluids  - will order abdomen U/S to evaluate for cirrhosis, from mild hepatitis
CIWA zero  SBIRT
CIWA zero  SBIRT
Assessment:  - patient known to be a chronic drinker  - DYLAN score 3    Plan:  - SBIRT consult  cont DYLAN

## 2022-12-19 NOTE — DISCHARGE NOTE PROVIDER - NSDCFUADDINST_GEN_ALL_CORE_FT
RLE shin with wound: Continue topical recommendations as per wound MD: Cleanse with NS, pat dry. Apply betadine, allow to dry, cover with small Adaptic touch silicone contact layer to prevent dressing adherence. Cover with ABD, wrap with Kerlix and ACE wrap. Change dressing daily.

## 2022-12-19 NOTE — PROGRESS NOTE ADULT - PROBLEM SELECTOR PLAN 4
resolved  sono with hepatic steatosis
- AST/ALT 43/16  - will obtain abdomen ultrasound to evaluate for fatty liver diease  - trend AST/ALT

## 2022-12-19 NOTE — DISCHARGE NOTE PROVIDER - HOSPITAL COURSE
33 y/o M with pmhx of ETOH abuse presented to the ED for worsening of cellulitis on his R leg. Labs show leukopenia and anemia. Patient also concerning for possible ETOH withdrawal. Admit for cellulitis and ETOH withdrawal.    Cellulitis  - patient presented for cellulitis of the R leg  - labs significant for leukopenia, but can be attributed to myelosuppression from chronic ETOH use  - physical exam shows a poorly kempt leg with crusted purulence on the R shin  - x-ray of R leg - Diffuse lower extremity soft tissue edema.  - doppler neg  - bld cx neg x2  - wound care recs noted --> RLE shin with wound, Continue topical recommendations as per wound MD: Cleanse with NS, pat dry. Apply betadine, allow to dry, cover with small Adaptic touch silicone contact layer to prevent dressing adherence. Cover with ABD, wrap with Kerlix and ACE wrap. Change dressing daily.    - CT R leg because of chronic and prolonged cellulitis to r/o abscess-- no evidence of abscess or osteo.  - s/p IV abx transitioned to PO Doxy     Anemia of chronic disease  - microcytic, QUITA likely from chronic myelosuppression from ETOH use and iron deficiency anemia  - patient with WBC 3.53 and Hb 8.7, MCV 77  - multivitamin  - advised pt to stop drinking  - started protonix 40 mg daily    Alcohol dependence with withdrawal  - CIWA zero  - SBIRT    Acute noninfective hepatitis  - resolved  - sono with hepatic steatosis    On 12/19/22 this case was reviewed with Dr. Ochoa. The patient is medically stable and optimized for discharge. All medications were reviewed and prescriptions were sent to mutually agreed upon pharmacy. 33 y/o M with pmhx of ETOH abuse presented to the ED for worsening of cellulitis on his R leg. Labs show leukopenia and anemia. Patient also concerning for possible ETOH withdrawal. Admit for cellulitis and ETOH withdrawal.    Cellulitis  - patient presented for cellulitis of the R leg  - labs significant for leukopenia, but can be attributed to myelosuppression from chronic ETOH use  - physical exam showed  a poorly kempt leg with crusted purulence on the R shin  - x-ray of R leg - Diffuse lower extremity soft tissue edema.  - doppler neg  - bld cx neg x2  - wound care recs noted --> RLE shin with wound, Continue topical recommendations as per wound MD: Cleanse with NS, pat dry. Apply betadine, allow to dry, cover with small Adaptic touch silicone contact layer to prevent dressing adherence. Cover with ABD, wrap with Kerlix and ACE wrap. Change dressing daily.    - CT R leg because of chronic and prolonged cellulitis to r/o abscess-- no evidence of abscess or osteo.  - s/p IV abx transitioned to PO Doxy     Anemia of chronic disease  - microcytic, QUITA likely from chronic myelosuppression from ETOH use and iron deficiency anemia  - patient with WBC 3.53 and Hb 8.7, MCV 77  - multivitamin  - advised pt to stop drinking  - started protonix 40 mg daily    Alcohol dependence with withdrawal  - CIWA zero  - SBIRT    Acute noninfective hepatitis  - resolved  - sono with hepatic steatosis    On 12/19/22 this case was reviewed with Dr. Ochoa. The patient is medically stable and optimized for discharge. All medications were reviewed and prescriptions were sent to mutually agreed upon pharmacy.

## 2022-12-19 NOTE — PROGRESS NOTE ADULT - ASSESSMENT
31 y/o M with pmhx of ETOH abuse presented to the ED for worsening of cellulitis on his R leg. Labs show leukopenia and anemia. Patient also concerning for possible ETOH withdrawal. Admit for cellulitis and ETOH withdrawal.
31 y/o M with pmhx of ETOH abuse presented to the ED for worsening of cellulitis on his R leg. Labs show leukopenia and anemia. Patient also concerning for possible ETOH withdrawal. Admit for cellulitis and ETOH withdrawal.
33 y/o M with pmhx of ETOH abuse presented to the ED for worsening of cellulitis on his R leg. Labs show leukopenia and anemia. Patient also concerning for possible ETOH withdrawal. Admit for cellulitis and ETOH withdrawal.
31 y/o M with pmhx of ETOH abuse presented to the ED for worsening of cellulitis on his R leg. Labs show leukopenia and anemia. Patient also concerning for possible ETOH withdrawal. Admit for cellulitis and ETOH withdrawal.

## 2022-12-19 NOTE — PROGRESS NOTE ADULT - PROBLEM SELECTOR PLAN 6
- social work consult
- social work consult  assisting with shelter placement; pt was not found in the shelter system, but pt used another name on previous admission [facility]; SW informed via email
- social work consult
- social work consult
- social work consult  assisting with shelter placement; pt was not found in the shelter system, but pt used another name on previous admission   DC to [facility] today  Patient hemodynamically stable for discharge   Time spent in discharge process is 40 min  case d/w ACP
- social work consult  assisting with shelter placement; pt was not found in the shelter system, but pt used another name on previous admission [facility]; SW informed via email

## 2022-12-19 NOTE — DISCHARGE NOTE NURSING/CASE MANAGEMENT/SOCIAL WORK - NSDCFUADDAPPT_GEN_ALL_CORE_FT
Follow up with your primary care physician for further monitoring in 1-2 weeks. Please call [phone] to confirm/cancel/change appointment. You have an appointment scheduled for 12/28/22 at 1:30pm. Please bring in photo ID.

## 2022-12-19 NOTE — PHARMACOTHERAPY INTERVENTION NOTE - COMMENTS
Current medications reviewed with the patient. Current medication schedule was discussed in detail including: medication name, indication, dose, administration times, treatment duration, side effects, drug interactions, and special instructions. Patient questions and concerns were answered and addressed. Patient demonstrated understanding. Informed patient that medication list may change prior to discharge. Patient provided with educational handout.    Bright Izaguirre PharmD  Clinical Pharmacy Specialist  [phone]

## 2022-12-19 NOTE — PROGRESS NOTE ADULT - PROBLEM SELECTOR PLAN 2
Microcytic, QUITA  Assessment:  - patient with WBC 3.53 and Hb 8.7  - MCV 77  - likely from chronic myelosuppression from ETOH use and iron deficiency anemia    Plan:  - will give multivitamin  - advise to stop drinking  - will send iron panel  - will supplement with iron MWF if found to have low ferritin/low iron  - addition of protonix 40 mg daily
Microcytic, QUITA  Assessment:  - patient with WBC 3.53 and Hb 8.7  - MCV 77  - likely from chronic myelosuppression from ETOH use and iron deficiency anemia    Plan:  - will give multivitamin  - advise to stop drinking  - addition of protonix 40 mg daily
Microcytic, QUITA  Assessment:  - patient with WBC 3.53 and Hb 8.7  - MCV 77  - likely from chronic myelosuppression from ETOH use and iron deficiency anemia    Plan:  - will give multivitamin  - advise to stop drinking  - will send iron panel  - will supplement with iron MWF if found to have low ferritin/low iron  - addition of protonix 40 mg daily
Microcytic, QUITA  Assessment:  - patient with WBC 3.53 and Hb 8.7  - MCV 77  - likely from chronic myelosuppression from ETOH use and iron deficiency anemia    Plan:  - will give multivitamin  - advise to stop drinking  - addition of protonix 40 mg daily

## 2022-12-19 NOTE — DISCHARGE NOTE PROVIDER - NSFOLLOWUPCLINICS_GEN_ALL_ED_FT
[facility] [place]  Internal Medicine  [address] [place] [address]  Phone: [phone]  Fax: [phone]

## 2022-12-19 NOTE — DISCHARGE NOTE PROVIDER - NSDCCPCAREPLAN_GEN_ALL_CORE_FT
PRINCIPAL DISCHARGE DIAGNOSIS  Diagnosis: Cellulitis  Assessment and Plan of Treatment: You were treated with IV antibiotic for skin infection of the right leg. Please take Doxycycline oral antibiotic therapy as prescribed to complete total course on 12/26/22 after AM dose. Continue local wound care as directed in additional information.      SECONDARY DISCHARGE DIAGNOSES  Diagnosis: Anemia of chronic disease  Assessment and Plan of Treatment: You were found to have low red blood cell count, chronic in nature likely due to alcohol use and iron deficiency. Please abstain from alcohol use. Please take multiviamin, folic acid, thiamine supplements as pescribed. Please take Protonix as prescribed to protect stomach lining. Please take Iron / Ferrous sulfate 3x weekly as prescribed to help maintain blood count. Follow up with PCP for routine blood work and monitoring of CBC / red blood cell count.

## 2022-12-19 NOTE — DISCHARGE NOTE PROVIDER - NSDCFUADDAPPT_GEN_ALL_CORE_FT
Follow up with your primary care physician for further monitoring in 1-2 weeks. Please call to arrange appointment.  Follow up with your primary care physician for further monitoring in 1-2 weeks. Please call [phone] to confirm/cancel/change appointment. You have an appointment scheduled for 12/28/22 at 1:30pm. Please bring in photo ID.

## 2022-12-19 NOTE — DISCHARGE NOTE NURSING/CASE MANAGEMENT/SOCIAL WORK - NSDCPEFALRISK_GEN_ALL_CORE
For information on Fall & Injury Prevention, visit: [email or web address] OR  [email or web address] OR  https://www.cdc.gov/steadi/patient.html

## 2022-12-19 NOTE — PROGRESS NOTE ADULT - PROBLEM SELECTOR PROBLEM 4
Acute noninfective hepatitis

## 2022-12-28 ENCOUNTER — APPOINTMENT (OUTPATIENT)
Dept: INTERNAL MEDICINE | Facility: CLINIC | Age: 32
End: 2022-12-28

## 2023-02-15 ENCOUNTER — INPATIENT (INPATIENT)
Facility: HOSPITAL | Age: 33
LOS: 8 days | Discharge: HOME | End: 2023-02-24
Attending: STUDENT IN AN ORGANIZED HEALTH CARE EDUCATION/TRAINING PROGRAM | Admitting: STUDENT IN AN ORGANIZED HEALTH CARE EDUCATION/TRAINING PROGRAM
Payer: MEDICAID

## 2023-02-15 VITALS
OXYGEN SATURATION: 97 % | RESPIRATION RATE: 18 BRPM | TEMPERATURE: 98 F | DIASTOLIC BLOOD PRESSURE: 65 MMHG | SYSTOLIC BLOOD PRESSURE: 108 MMHG | HEART RATE: 83 BPM

## 2023-02-15 DIAGNOSIS — D69.6 THROMBOCYTOPENIA, UNSPECIFIED: ICD-10-CM

## 2023-02-15 DIAGNOSIS — K76.0 FATTY (CHANGE OF) LIVER, NOT ELSEWHERE CLASSIFIED: ICD-10-CM

## 2023-02-15 DIAGNOSIS — L03.90 CELLULITIS, UNSPECIFIED: ICD-10-CM

## 2023-02-15 DIAGNOSIS — Z29.9 ENCOUNTER FOR PROPHYLACTIC MEASURES, UNSPECIFIED: ICD-10-CM

## 2023-02-15 DIAGNOSIS — L03.119 CELLULITIS OF UNSPECIFIED PART OF LIMB: ICD-10-CM

## 2023-02-15 DIAGNOSIS — D70.9 NEUTROPENIA, UNSPECIFIED: ICD-10-CM

## 2023-02-15 DIAGNOSIS — R79.89 OTHER SPECIFIED ABNORMAL FINDINGS OF BLOOD CHEMISTRY: ICD-10-CM

## 2023-02-15 DIAGNOSIS — D61.818 OTHER PANCYTOPENIA: ICD-10-CM

## 2023-02-15 DIAGNOSIS — F10.10 ALCOHOL ABUSE, UNCOMPLICATED: ICD-10-CM

## 2023-02-15 DIAGNOSIS — A41.9 SEPSIS, UNSPECIFIED ORGANISM: ICD-10-CM

## 2023-02-15 LAB
ALBUMIN SERPL ELPH-MCNC: 3.2 G/DL — LOW (ref 3.3–5)
ALP SERPL-CCNC: 195 U/L — HIGH (ref 40–120)
ALT FLD-CCNC: 45 U/L — HIGH (ref 4–41)
ANION GAP SERPL CALC-SCNC: 11 MMOL/L — SIGNIFICANT CHANGE UP (ref 7–14)
ANISOCYTOSIS BLD QL: SIGNIFICANT CHANGE UP
APPEARANCE UR: CLEAR — SIGNIFICANT CHANGE UP
APTT BLD: 37.6 SEC — HIGH (ref 27–36.3)
AST SERPL-CCNC: 155 U/L — HIGH (ref 4–40)
B PERT DNA SPEC QL NAA+PROBE: SIGNIFICANT CHANGE UP
B PERT+PARAPERT DNA PNL SPEC NAA+PROBE: SIGNIFICANT CHANGE UP
BASE EXCESS BLDV CALC-SCNC: -0.4 MMOL/L — SIGNIFICANT CHANGE UP (ref -2–3)
BASOPHILS # BLD AUTO: 0.02 K/UL — SIGNIFICANT CHANGE UP (ref 0–0.2)
BASOPHILS # BLD AUTO: 0.04 K/UL — SIGNIFICANT CHANGE UP (ref 0–0.2)
BASOPHILS NFR BLD AUTO: 0.9 % — SIGNIFICANT CHANGE UP (ref 0–2)
BASOPHILS NFR BLD AUTO: 2.2 % — HIGH (ref 0–2)
BILIRUB SERPL-MCNC: 0.6 MG/DL — SIGNIFICANT CHANGE UP (ref 0.2–1.2)
BILIRUB UR-MCNC: NEGATIVE — SIGNIFICANT CHANGE UP
BLOOD GAS VENOUS COMPREHENSIVE RESULT: SIGNIFICANT CHANGE UP
BORDETELLA PARAPERTUSSIS (RAPRVP): SIGNIFICANT CHANGE UP
BUN SERPL-MCNC: 6 MG/DL — LOW (ref 7–23)
C PNEUM DNA SPEC QL NAA+PROBE: SIGNIFICANT CHANGE UP
CALCIUM SERPL-MCNC: 8 MG/DL — LOW (ref 8.4–10.5)
CHLORIDE BLDV-SCNC: 109 MMOL/L — HIGH (ref 96–108)
CHLORIDE SERPL-SCNC: 103 MMOL/L — SIGNIFICANT CHANGE UP (ref 98–107)
CO2 BLDV-SCNC: 26.8 MMOL/L — HIGH (ref 22–26)
CO2 SERPL-SCNC: 23 MMOL/L — SIGNIFICANT CHANGE UP (ref 22–31)
COLOR SPEC: SIGNIFICANT CHANGE UP
CREAT SERPL-MCNC: 0.38 MG/DL — LOW (ref 0.5–1.3)
CRP SERPL-MCNC: 4.3 MG/L — SIGNIFICANT CHANGE UP
D DIMER BLD IA.RAPID-MCNC: 859 NG/ML DDU — HIGH
DIFF PNL FLD: NEGATIVE — SIGNIFICANT CHANGE UP
EGFR: 152 ML/MIN/1.73M2 — SIGNIFICANT CHANGE UP
EOSINOPHIL # BLD AUTO: 0.18 K/UL — SIGNIFICANT CHANGE UP (ref 0–0.5)
EOSINOPHIL # BLD AUTO: 0.21 K/UL — SIGNIFICANT CHANGE UP (ref 0–0.5)
EOSINOPHIL NFR BLD AUTO: 8.7 % — HIGH (ref 0–6)
EOSINOPHIL NFR BLD AUTO: 9.7 % — HIGH (ref 0–6)
ERYTHROCYTE [SEDIMENTATION RATE] IN BLOOD: 97 MM/HR — HIGH (ref 1–15)
ETHANOL SERPL-MCNC: 259 MG/DL — HIGH
FERRITIN SERPL-MCNC: 27 NG/ML — LOW (ref 30–400)
FIBRINOGEN PPP-MCNC: 340 MG/DL — SIGNIFICANT CHANGE UP (ref 200–465)
FLUAV SUBTYP SPEC NAA+PROBE: SIGNIFICANT CHANGE UP
FLUBV RNA SPEC QL NAA+PROBE: SIGNIFICANT CHANGE UP
FOLATE SERPL-MCNC: 8.2 NG/ML — SIGNIFICANT CHANGE UP (ref 3.1–17.5)
GAS PNL BLDV: 142 MMOL/L — SIGNIFICANT CHANGE UP (ref 136–145)
GLUCOSE BLDV-MCNC: 94 MG/DL — SIGNIFICANT CHANGE UP (ref 70–99)
GLUCOSE SERPL-MCNC: 92 MG/DL — SIGNIFICANT CHANGE UP (ref 70–99)
GLUCOSE UR QL: NEGATIVE — SIGNIFICANT CHANGE UP
HADV DNA SPEC QL NAA+PROBE: SIGNIFICANT CHANGE UP
HAPTOGLOB SERPL-MCNC: 87 MG/DL — SIGNIFICANT CHANGE UP (ref 34–200)
HCO3 BLDV-SCNC: 25 MMOL/L — SIGNIFICANT CHANGE UP (ref 22–29)
HCOV 229E RNA SPEC QL NAA+PROBE: SIGNIFICANT CHANGE UP
HCOV HKU1 RNA SPEC QL NAA+PROBE: SIGNIFICANT CHANGE UP
HCOV NL63 RNA SPEC QL NAA+PROBE: SIGNIFICANT CHANGE UP
HCOV OC43 RNA SPEC QL NAA+PROBE: SIGNIFICANT CHANGE UP
HCT VFR BLD CALC: 25.7 % — LOW (ref 39–50)
HCT VFR BLD CALC: 26.6 % — LOW (ref 39–50)
HCT VFR BLDA CALC: 27 % — LOW (ref 39–51)
HGB BLD CALC-MCNC: 8.9 G/DL — LOW (ref 12.6–17.4)
HGB BLD-MCNC: 7.8 G/DL — LOW (ref 13–17)
HGB BLD-MCNC: 8.1 G/DL — LOW (ref 13–17)
HIV 1+2 AB+HIV1 P24 AG SERPL QL IA: SIGNIFICANT CHANGE UP
HMPV RNA SPEC QL NAA+PROBE: SIGNIFICANT CHANGE UP
HPIV1 RNA SPEC QL NAA+PROBE: SIGNIFICANT CHANGE UP
HPIV2 RNA SPEC QL NAA+PROBE: SIGNIFICANT CHANGE UP
HPIV3 RNA SPEC QL NAA+PROBE: SIGNIFICANT CHANGE UP
HPIV4 RNA SPEC QL NAA+PROBE: SIGNIFICANT CHANGE UP
HYPOCHROMIA BLD QL: SIGNIFICANT CHANGE UP
IANC: 0.67 K/UL — LOW (ref 1.8–7.4)
IANC: 0.82 K/UL — LOW (ref 1.8–7.4)
IMM GRANULOCYTES NFR BLD AUTO: 0 % — SIGNIFICANT CHANGE UP (ref 0–0.9)
INR BLD: 1.15 RATIO — SIGNIFICANT CHANGE UP (ref 0.88–1.16)
IRON SATN MFR SERPL: 18 UG/DL — LOW (ref 45–165)
IRON SATN MFR SERPL: 6 % — LOW (ref 14–50)
KETONES UR-MCNC: NEGATIVE — SIGNIFICANT CHANGE UP
LACTATE BLDV-MCNC: 1.6 MMOL/L — SIGNIFICANT CHANGE UP (ref 0.5–2)
LDH SERPL L TO P-CCNC: 255 U/L — HIGH (ref 135–225)
LEUKOCYTE ESTERASE UR-ACNC: NEGATIVE — SIGNIFICANT CHANGE UP
LYMPHOCYTES # BLD AUTO: 0.72 K/UL — LOW (ref 1–3.3)
LYMPHOCYTES # BLD AUTO: 0.92 K/UL — LOW (ref 1–3.3)
LYMPHOCYTES # BLD AUTO: 38.9 % — SIGNIFICANT CHANGE UP (ref 13–44)
LYMPHOCYTES # BLD AUTO: 39.1 % — SIGNIFICANT CHANGE UP (ref 13–44)
M PNEUMO DNA SPEC QL NAA+PROBE: SIGNIFICANT CHANGE UP
MACROCYTES BLD QL: SLIGHT — SIGNIFICANT CHANGE UP
MAGNESIUM SERPL-MCNC: 1.8 MG/DL — SIGNIFICANT CHANGE UP (ref 1.6–2.6)
MANUAL SMEAR VERIFICATION: SIGNIFICANT CHANGE UP
MCHC RBC-ENTMCNC: 23.5 PG — LOW (ref 27–34)
MCHC RBC-ENTMCNC: 23.6 PG — LOW (ref 27–34)
MCHC RBC-ENTMCNC: 30.4 GM/DL — LOW (ref 32–36)
MCHC RBC-ENTMCNC: 30.5 GM/DL — LOW (ref 32–36)
MCV RBC AUTO: 77.1 FL — LOW (ref 80–100)
MCV RBC AUTO: 77.6 FL — LOW (ref 80–100)
MICROCYTES BLD QL: SLIGHT — SIGNIFICANT CHANGE UP
MONOCYTES # BLD AUTO: 0.1 K/UL — SIGNIFICANT CHANGE UP (ref 0–0.9)
MONOCYTES # BLD AUTO: 0.24 K/UL — SIGNIFICANT CHANGE UP (ref 0–0.9)
MONOCYTES NFR BLD AUTO: 13 % — SIGNIFICANT CHANGE UP (ref 2–14)
MONOCYTES NFR BLD AUTO: 4.4 % — SIGNIFICANT CHANGE UP (ref 2–14)
NEUTROPHILS # BLD AUTO: 0.67 K/UL — LOW (ref 1.8–7.4)
NEUTROPHILS # BLD AUTO: 1.07 K/UL — LOW (ref 1.8–7.4)
NEUTROPHILS NFR BLD AUTO: 36.2 % — LOW (ref 43–77)
NEUTROPHILS NFR BLD AUTO: 44.3 % — SIGNIFICANT CHANGE UP (ref 43–77)
NEUTS BAND # BLD: 0.9 % — SIGNIFICANT CHANGE UP (ref 0–6)
NITRITE UR-MCNC: NEGATIVE — SIGNIFICANT CHANGE UP
NRBC # BLD: 0 /100 WBCS — SIGNIFICANT CHANGE UP (ref 0–0)
NRBC # FLD: 0 K/UL — SIGNIFICANT CHANGE UP (ref 0–0)
PCO2 BLDV: 46 MMHG — SIGNIFICANT CHANGE UP (ref 42–55)
PH BLDV: 7.35 — SIGNIFICANT CHANGE UP (ref 7.32–7.43)
PH UR: 7 — SIGNIFICANT CHANGE UP (ref 5–8)
PLAT MORPH BLD: NORMAL — SIGNIFICANT CHANGE UP
PLATELET # BLD AUTO: 70 K/UL — LOW (ref 150–400)
PLATELET # BLD AUTO: 86 K/UL — LOW (ref 150–400)
PLATELET COUNT - ESTIMATE: ABNORMAL
PO2 BLDV: 66 MMHG — HIGH (ref 25–45)
POIKILOCYTOSIS BLD QL AUTO: SIGNIFICANT CHANGE UP
POTASSIUM BLDV-SCNC: 3.6 MMOL/L — SIGNIFICANT CHANGE UP (ref 3.5–5.1)
POTASSIUM SERPL-MCNC: 3.5 MMOL/L — SIGNIFICANT CHANGE UP (ref 3.5–5.3)
POTASSIUM SERPL-SCNC: 3.5 MMOL/L — SIGNIFICANT CHANGE UP (ref 3.5–5.3)
PROT SERPL-MCNC: 8.3 G/DL — SIGNIFICANT CHANGE UP (ref 6–8.3)
PROT UR-MCNC: ABNORMAL
PROTHROM AB SERPL-ACNC: 13.4 SEC — SIGNIFICANT CHANGE UP (ref 10.5–13.4)
RAPID RVP RESULT: SIGNIFICANT CHANGE UP
RBC # BLD: 3.31 M/UL — LOW (ref 4.2–5.8)
RBC # BLD: 3.31 M/UL — LOW (ref 4.2–5.8)
RBC # BLD: 3.45 M/UL — LOW (ref 4.2–5.8)
RBC # FLD: 22.5 % — HIGH (ref 10.3–14.5)
RBC # FLD: 22.6 % — HIGH (ref 10.3–14.5)
RBC BLD AUTO: ABNORMAL
RETICS #: 63.2 K/UL — SIGNIFICANT CHANGE UP (ref 25–125)
RETICS/RBC NFR: 1.9 % — SIGNIFICANT CHANGE UP (ref 0.5–2.5)
RSV RNA SPEC QL NAA+PROBE: SIGNIFICANT CHANGE UP
RV+EV RNA SPEC QL NAA+PROBE: SIGNIFICANT CHANGE UP
SAO2 % BLDV: 89 % — HIGH (ref 67–88)
SARS-COV-2 RNA SPEC QL NAA+PROBE: SIGNIFICANT CHANGE UP
SODIUM SERPL-SCNC: 137 MMOL/L — SIGNIFICANT CHANGE UP (ref 135–145)
SP GR SPEC: 1.02 — SIGNIFICANT CHANGE UP (ref 1.01–1.05)
TARGETS BLD QL SMEAR: SIGNIFICANT CHANGE UP
TIBC SERPL-MCNC: 312 UG/DL — SIGNIFICANT CHANGE UP (ref 220–430)
UIBC SERPL-MCNC: 294 UG/DL — SIGNIFICANT CHANGE UP (ref 110–370)
UROBILINOGEN FLD QL: SIGNIFICANT CHANGE UP
VARIANT LYMPHS # BLD: 1.7 % — SIGNIFICANT CHANGE UP (ref 0–6)
VIT B12 SERPL-MCNC: 796 PG/ML — SIGNIFICANT CHANGE UP (ref 200–900)
WBC # BLD: 1.85 K/UL — LOW (ref 3.8–10.5)
WBC # BLD: 2.36 K/UL — LOW (ref 3.8–10.5)
WBC # FLD AUTO: 1.85 K/UL — LOW (ref 3.8–10.5)
WBC # FLD AUTO: 2.36 K/UL — LOW (ref 3.8–10.5)

## 2023-02-15 PROCEDURE — 76700 US EXAM ABDOM COMPLETE: CPT | Mod: 26

## 2023-02-15 PROCEDURE — 99223 1ST HOSP IP/OBS HIGH 75: CPT

## 2023-02-15 PROCEDURE — 12345: CPT | Mod: NC

## 2023-02-15 PROCEDURE — 73590 X-RAY EXAM OF LOWER LEG: CPT | Mod: 26,RT

## 2023-02-15 PROCEDURE — 73706 CT ANGIO LWR EXTR W/O&W/DYE: CPT | Mod: 26,50,52

## 2023-02-15 PROCEDURE — 93970 EXTREMITY STUDY: CPT | Mod: 26

## 2023-02-15 PROCEDURE — 71275 CT ANGIOGRAPHY CHEST: CPT | Mod: 26

## 2023-02-15 PROCEDURE — 99285 EMERGENCY DEPT VISIT HI MDM: CPT

## 2023-02-15 RX ORDER — VANCOMYCIN HCL 1 G
1000 VIAL (EA) INTRAVENOUS ONCE
Refills: 0 | Status: COMPLETED | OUTPATIENT
Start: 2023-02-15 | End: 2023-02-15

## 2023-02-15 RX ORDER — ACETAMINOPHEN 500 MG
650 TABLET ORAL EVERY 6 HOURS
Refills: 0 | Status: DISCONTINUED | OUTPATIENT
Start: 2023-02-15 | End: 2023-02-24

## 2023-02-15 RX ORDER — INFLUENZA VIRUS VACCINE 15; 15; 15; 15 UG/.5ML; UG/.5ML; UG/.5ML; UG/.5ML
0.5 SUSPENSION INTRAMUSCULAR ONCE
Refills: 0 | Status: DISCONTINUED | OUTPATIENT
Start: 2023-02-15 | End: 2023-02-24

## 2023-02-15 RX ORDER — THIAMINE MONONITRATE (VIT B1) 100 MG
500 TABLET ORAL EVERY 24 HOURS
Refills: 0 | Status: DISCONTINUED | OUTPATIENT
Start: 2023-02-15 | End: 2023-02-23

## 2023-02-15 RX ORDER — SODIUM CHLORIDE 9 MG/ML
1000 INJECTION, SOLUTION INTRAVENOUS
Refills: 0 | Status: DISCONTINUED | OUTPATIENT
Start: 2023-02-15 | End: 2023-02-20

## 2023-02-15 RX ORDER — CEFEPIME 1 G/1
2000 INJECTION, POWDER, FOR SOLUTION INTRAMUSCULAR; INTRAVENOUS EVERY 8 HOURS
Refills: 0 | Status: DISCONTINUED | OUTPATIENT
Start: 2023-02-15 | End: 2023-02-17

## 2023-02-15 RX ORDER — FOLIC ACID 0.8 MG
1 TABLET ORAL DAILY
Refills: 0 | Status: DISCONTINUED | OUTPATIENT
Start: 2023-02-15 | End: 2023-02-24

## 2023-02-15 RX ORDER — SODIUM CHLORIDE 9 MG/ML
500 INJECTION INTRAMUSCULAR; INTRAVENOUS; SUBCUTANEOUS ONCE
Refills: 0 | Status: COMPLETED | OUTPATIENT
Start: 2023-02-15 | End: 2023-02-15

## 2023-02-15 RX ORDER — CEFEPIME 1 G/1
2000 INJECTION, POWDER, FOR SOLUTION INTRAMUSCULAR; INTRAVENOUS ONCE
Refills: 0 | Status: COMPLETED | OUTPATIENT
Start: 2023-02-15 | End: 2023-02-15

## 2023-02-15 RX ORDER — LANOLIN ALCOHOL/MO/W.PET/CERES
3 CREAM (GRAM) TOPICAL AT BEDTIME
Refills: 0 | Status: DISCONTINUED | OUTPATIENT
Start: 2023-02-15 | End: 2023-02-24

## 2023-02-15 RX ORDER — CEFEPIME 1 G/1
INJECTION, POWDER, FOR SOLUTION INTRAMUSCULAR; INTRAVENOUS
Refills: 0 | Status: DISCONTINUED | OUTPATIENT
Start: 2023-02-15 | End: 2023-02-17

## 2023-02-15 RX ORDER — ONDANSETRON 8 MG/1
4 TABLET, FILM COATED ORAL EVERY 8 HOURS
Refills: 0 | Status: DISCONTINUED | OUTPATIENT
Start: 2023-02-15 | End: 2023-02-24

## 2023-02-15 RX ORDER — VANCOMYCIN HCL 1 G
1250 VIAL (EA) INTRAVENOUS EVERY 12 HOURS
Refills: 0 | Status: DISCONTINUED | OUTPATIENT
Start: 2023-02-15 | End: 2023-02-16

## 2023-02-15 RX ADMIN — CEFEPIME 100 MILLIGRAM(S): 1 INJECTION, POWDER, FOR SOLUTION INTRAMUSCULAR; INTRAVENOUS at 09:01

## 2023-02-15 RX ADMIN — Medication 250 MILLIGRAM(S): at 04:12

## 2023-02-15 RX ADMIN — Medication 1 MILLIGRAM(S): at 11:43

## 2023-02-15 RX ADMIN — Medication 166.67 MILLIGRAM(S): at 17:01

## 2023-02-15 RX ADMIN — Medication 105 MILLIGRAM(S): at 07:57

## 2023-02-15 RX ADMIN — SODIUM CHLORIDE 500 MILLILITER(S): 9 INJECTION INTRAMUSCULAR; INTRAVENOUS; SUBCUTANEOUS at 04:04

## 2023-02-15 RX ADMIN — CEFEPIME 100 MILLIGRAM(S): 1 INJECTION, POWDER, FOR SOLUTION INTRAMUSCULAR; INTRAVENOUS at 19:10

## 2023-02-15 RX ADMIN — Medication 50 MILLIGRAM(S): at 05:42

## 2023-02-15 NOTE — PHYSICAL THERAPY INITIAL EVALUATION ADULT - GENERAL OBSERVATIONS, REHAB EVAL
Pt encountered in semisupine position, no distress, AxOx4, with +IV,+tele, +pulse oximeter, and +4L of O2 through nasal cannula. Pt agreeable to participate in PT evaluation.

## 2023-02-15 NOTE — PATIENT PROFILE ADULT - FALL HARM RISK - HARM RISK INTERVENTIONS
Assistance with ambulation/Assistance OOB with selected safe patient handling equipment/Communicate Risk of Fall with Harm to all staff/Monitor for mental status changes/Monitor gait and stability/Reinforce activity limits and safety measures with patient and family/Tailored Fall Risk Interventions/Toileting schedule using arm’s reach rule for commode and bathroom/Use of alarms - bed, chair and/or voice tab/Visual Cue: Yellow wristband and red socks/Bed in lowest position, wheels locked, appropriate side rails in place/Call bell, personal items and telephone in reach/Instruct patient to call for assistance before getting out of bed or chair/Non-slip footwear when patient is out of bed/[place] to call system/Physically safe environment - no spills, clutter or unnecessary equipment/Purposeful Proactive Rounding/Room/bathroom lighting operational, light cord in reach

## 2023-02-15 NOTE — ED ADULT NURSE NOTE - OBJECTIVE STATEMENT
Pt. presented to room 11. A&Ox4 German speaking. Pt c/o RLE wound/blister x 1 year.  Pt. noticed wound is leaking clear drainage around the area past 2 days . pt. also endorses worsening swelling to RLE.  Pt. is daily drinker apprx 12 drinks per day last drink 10:00 am. denies CP SOB abd pain n/v/d dysuria. Lwrist 18 lab sent medicated per order. pending admission

## 2023-02-15 NOTE — ED PROVIDER NOTE - ATTENDING CONTRIBUTION TO CARE
I performed a face-to-face evaluation of the patient and performed a history and physical examination along with the resident or ACP, and/or medical student above.  I agree with the history and physical examination as documented by the resident or ACP, and/or medical student above.  Toribio:  Gen: Alert, NAD  Head: NC, AT,  EOMI, normal lids/conjunctiva  ENT:  normal hearing, patent oropharynx without erythema/exudate  Neck: +supple, no tenderness/meningismus/JVD, +Trachea midline  Chest: no chest wall tenderness, equal chest rise  Pulm: Bilateral BS, normal resp effort, no wheeze/stridor/retractions  CV: RRR, no M/R/G, +dist pulses  Abd: +BS, soft, NT/ND  Rectal: deferred  Mskel:  RLE lower anterior tibial region with erythema, indurated but no fluctuance; +warmth, TTP, clear discharge noted.  Skin: as above  Neuro: AAOx3, no sensory/motor deficits, CN 2-12 intact

## 2023-02-15 NOTE — PROGRESS NOTE ADULT - PROBLEM SELECTOR PLAN 4
last drink on 2/14   drinks 12-18 beers per day   start CIWA with IV ativan   if requires doses, consider standing regimen tomorrow Distended abdomin. Non tender. Ab US from last visit showed hepatic steatosis  -abd US continues to show fatty liver. No s/s masses or cirrhosis

## 2023-02-15 NOTE — H&P ADULT - PROBLEM SELECTOR PLAN 2
These hematological changes were attributed to ETOH during last admission  -Will get LDH, haptoglobin, Iron studies, Folate B12, avelino, reticulovyte count  -Consult hematology after above studies done if basic workup complete and normal  -Hold DVT ppx for now  -Does not meet 4Ts for HIT  -check HIV

## 2023-02-15 NOTE — PHYSICAL THERAPY INITIAL EVALUATION ADULT - PRECAUTIONS/LIMITATIONS, REHAB EVAL
+4L of O2 through nasal cannula (pt desaturates when he sleeps as per RN)/cardiac precautions/oxygen therapy device and L/min

## 2023-02-15 NOTE — CHART NOTE - NSCHARTNOTEFT_GEN_A_CORE
Medicine NP note    D-dimer 859, pt had lower extremities doppler done r/o DVT , negative , denies SOB, Hima Roman notified , CTA chest r/o PE ordered, pt denies chest pain , will monitor.      Brianna Gomez, NP-C  Department of Medicine- [facility]  In House Pager #[phone]

## 2023-02-15 NOTE — H&P ADULT - HISTORY OF PRESENT ILLNESS
31 y/o M with pmhx of ETOH abuse, presumed alcohol induced cytopenias presented to the ED for worsening of cellulitis on his R leg. The patient is undomicled and does not have outpatient follow up. Patient is intoxicated at time of interview with  [id number]. A/O x2-3 but able to follow basic commands. He was recently here at [facility] in Dec 2022 for similar complaints and D/c home with PO doxy and discharged to a shelter. Per chart review, patient was admitted under alternate aliases. Patient’s last drink was last night around midnight prior to coming to the hospital. Per chart review, he drank 1 can of beet and a few bottles of liquor previously admission but denies hx of withdrawals. Denies chest pain, dyspnea, diarrhea, chest pain, fever, chills.  In the ED, VSS, Labs: wbc 2.36 (920 neutrophils), PLT 86 (baseline 152), ALT//44. Given Vanc in ED.

## 2023-02-15 NOTE — PHYSICAL THERAPY INITIAL EVALUATION ADULT - NSPTDISCHREC_GEN_A_CORE
Pt appears to be at his baseline level; will keep pt on PT program while @ [facility] to prevent further weakness/deconditioning/No skilled PT needs

## 2023-02-15 NOTE — PHYSICAL THERAPY INITIAL EVALUATION ADULT - ADDITIONAL COMMENTS
Pt reports that he lives on the [address], he no longer lives at the shelter. Prior to hospital admission pt was completely independent and used a single axis cane with ambulation. Pt denies any recent falls.  ( [facility] #[id number] used)     Pt left comfortable on stretcher, NAD, all lines intact, all precautions maintained, with call bell in reach, and RN aware of PT evaluation.

## 2023-02-15 NOTE — H&P ADULT - NSHPLABSRESULTS_GEN_ALL_CORE
02-15    137  |  103  |  6<L>  ----------------------------<  92  3.5   |  23  |  0.38<L>    Ca    8.0<L>      15 Feb 2023 03:00  Mg     1.80     02-15    TPro  8.3  /  Alb  3.2<L>  /  TBili  0.6  /  DBili  x   /  AST  155<H>  /  ALT  45<H>  /  AlkPhos  195<H>  02-15               8.1    2.36  )-----------( 86       ( 15 Feb 2023 03:00 )             26.6     LIVER FUNCTIONS - ( 15 Feb 2023 03:00 )  Alb: 3.2 g/dL / Pro: 8.3 g/dL / ALK PHOS: 195 U/L / ALT: 45 U/L / AST: 155 U/L / GGT: x           EKG: N/A    Image: XR INTERPRETATION:  No acute fracture or dislocation. Diffuse soft tissue swelling.

## 2023-02-15 NOTE — PHYSICAL THERAPY INITIAL EVALUATION ADULT - PATIENT PROFILE REVIEW, REHAB EVAL
ACTIVITY: Ambulate with assistance; Spoke with ED RN Kary London prior to PT evaluation--> Pt OK for PT consult/OOB activity/yes

## 2023-02-15 NOTE — PROGRESS NOTE ADULT - PROBLEM SELECTOR PLAN 2
These hematological changes were attributed to ETOH during last admission  - LDH mildly elevated, haptoglobin normal, Iron studies c/w QUITA, Folate/B12 both WNL,   - avelino pending   - reticulocyte index hypoproliferative (makes sense give severe malnutrition and QUITA)   - Hold DVT ppx for now  - Does not meet 4Ts for HIT  -HIV negative   -consider hematology c/s if continues to worsen

## 2023-02-15 NOTE — ED ADULT TRIAGE NOTE - CHIEF COMPLAINT QUOTE
pt arrives ambulatory c/o pain from right knee to foot for 1 year. Edema and open wounds noted to lower right leg. Denies fevers/chills. Pmhx: Etoh abuse.

## 2023-02-15 NOTE — H&P ADULT - PROBLEM SELECTOR PLAN 1
Patient has neutropenia, tachycardia on presentation with RLE wound. This is likely from ETOH but in the right clinical setting could be sepsis. Source possibly cellulitis  -Will cover with Vanc/Cefepime due to neutropenia  -F/U blood culture and CT RLE to r/o abscess  -Duplex to assess for DVTs

## 2023-02-15 NOTE — ED ADULT NURSE NOTE - DATE/TIME OF ACCEPTANCE
Recommend always carry emergency glucose tablets or candy, in case of low blood sugars.  Recommend carry medical identification.   Recommend yearly dilated eye exams and dental exams.  Increase Metformin to 1000mg twice daily.   Continue Lantus insulin 68 units daily, if morning blood sugars less than 100mg/dL, decrease by 5 units and do not go back up.   Increase Ozempic to 1.0mg weekly (Thursdays). On rare occasions this medication causes pancreatitis. If abdominal pain or severe nausea with or without vomiting, stop medication and call office. Store unused pens in refrigerator.   Continue other medications.   Please call or message me with any questions or concerns or if having blood sugar less than 70mg/dL or over 300mg/dL.  Follow up for office visit in 2 months, please bring glucometer to office visit.  Fasting labs a week before office visit.    15-Feb-2023 00:49

## 2023-02-15 NOTE — H&P ADULT - ASSESSMENT
31 y/o M with pmhx of ETOH abuse, presumed alcohol induced cytopenias presented to the ED for worsening of cellulitis on his R leg. Now admitted for worsening RLE cellulitis with possible abscess and concern for alcohol withdrawal and concern for inability to care for self at home.

## 2023-02-15 NOTE — PROGRESS NOTE ADULT - SUBJECTIVE AND OBJECTIVE BOX
Diana Rabago MD  [facility] Division of Hospital Medicine  Pager: [phone]  Available via MS Teams    SUBJECTIVE / OVERNIGHT EVENTS:    Complains of pain in the RLE     MEDICATIONS  (STANDING):  cefepime   IVPB 2000 milliGRAM(s) IV Intermittent every 8 hours  cefepime   IVPB      folic acid 1 milliGRAM(s) Oral daily  lactated ringers. 1000 milliLiter(s) (125 mL/Hr) IV Continuous <Continuous>  thiamine IVPB 500 milliGRAM(s) IV Intermittent every 24 hours  vancomycin  IVPB 1250 milliGRAM(s) IV Intermittent every 12 hours    MEDICATIONS  (PRN):  acetaminophen     Tablet .. 650 milliGRAM(s) Oral every 6 hours PRN Temp greater or equal to 38C (100.4F), Mild Pain (1 - 3)  aluminum hydroxide/magnesium hydroxide/simethicone Suspension 30 milliLiter(s) Oral every 4 hours PRN Dyspepsia  LORazepam     Tablet 2 milliGRAM(s) Oral every 1 hour PRN CIWA-Ar score 8 or greater  melatonin 3 milliGRAM(s) Oral at bedtime PRN Insomnia  ondansetron Injectable 4 milliGRAM(s) IV Push every 8 hours PRN Nausea and/or Vomiting      I&O's Summary      PHYSICAL EXAM:  Vital Signs Last 24 Hrs  T(C): 36.6 (15 Feb 2023 12:42), Max: 36.8 (15 Feb 2023 09:47)  T(F): 97.9 (15 Feb 2023 12:42), Max: 98.3 (15 Feb 2023 09:47)  HR: 92 (15 Feb 2023 12:42) (83 - 101)  BP: 107/67 (15 Feb 2023 12:42) (103/59 - 137/84)  BP(mean): --  RR: 16 (15 Feb 2023 12:42) (16 - 18)  SpO2: 100% (15 Feb 2023 12:42) (97% - 100%)    Parameters below as of 15 Feb 2023 12:42  Patient On (Oxygen Delivery Method): room air      CONSTITUTIONAL: NAD, well-developed   EYES: conjunctiva and sclera clear  ENMT: Moist oral mucosa   NECK: Supple   RESPIRATORY: Normal respiratory effort; lungs are clear to auscultation bilaterally  CARDIOVASCULAR: Regular rate and rhythm, normal S1 and S2, no murmur/rub/gallop; Peripheral pulses are 2+ bilaterally  ABDOMEN: Nontender to palpation, normoactive bowel sounds, no rebound/guarding   MUSCULOSKELETAL: No lower extremity edema   PSYCH: A+O to person, place, and time; affect appropriate  NEUROLOGY: no gross sensory or motor deficits   SKIN: No rashes    LABS:                        7.8    1.85  )-----------( 70       ( 15 Feb 2023 07:44 )             25.7     02-15    137  |  103  |  6<L>  ----------------------------<  92  3.5   |  23  |  0.38<L>    Ca    8.0<L>      15 Feb 2023 03:00  Mg     1.80     02-15    TPro  8.3  /  Alb  3.2<L>  /  TBili  0.6  /  DBili  x   /  AST  155<H>  /  ALT  45<H>  /  AlkPhos  195<H>  02-15    PT/INR - ( 15 Feb 2023 07:44 )   PT: 13.4 sec;   INR: 1.15 ratio         PTT - ( 15 Feb 2023 07:44 )  PTT:37.6 sec      Urinalysis Basic - ( 15 Feb 2023 09:51 )    Color: Light Yellow / Appearance: Clear / S.022 / pH: x  Gluc: x / Ketone: Negative  / Bili: Negative / Urobili: <2 mg/dL   Blood: x / Protein: Trace / Nitrite: Negative   Leuk Esterase: Negative / RBC: x / WBC x   Sq Epi: x / Non Sq Epi: x / Bacteria: x        SARS-CoV-2: NotDetec (15 Feb 2023 03:32)  SARS-CoV-2: NotDetec (19 Dec 2022 01:35)  COVID-19 PCR: NotDetec (18 Dec 2022 17:00)      RADIOLOGY & ADDITIONAL TESTS:  Other Results Reviewed Today: BMP with stable Cr, CBC with stable Hg     COORDINATION OF CARE:  Communication: discussed plan of care with ACP

## 2023-02-15 NOTE — ED ADULT NURSE NOTE - NSIMPLEMENTINTERV_GEN_ALL_ED
Implemented All Universal Safety Interventions:  [place] to call system. Call bell, personal items and telephone within reach. Instruct patient to call for assistance. Room bathroom lighting operational. Non-slip footwear when patient is off stretcher. Physically safe environment: no spills, clutter or unnecessary equipment. Stretcher in lowest position, wheels locked, appropriate side rails in place.

## 2023-02-15 NOTE — ED PROVIDER NOTE - CLINICAL SUMMARY MEDICAL DECISION MAKING FREE TEXT BOX
31yo M PMH alcohol abuse, cellulitis of the R leg, AOCD presents to the ER today c/o foot pain x 1yr, now with discharge x2 days. On POCUS pt with no drainable abscess - diffuse cobblestoning. Plan to obtain labs, xr, give abx, admit.

## 2023-02-15 NOTE — ED ADULT TRIAGE NOTE - HISTORY OF COVID-19 VACCINATION
Writer entered room and pt placed herself back on oxygen because she \"felt short of breath.\" pulse ox mid 90's on room air throughout the day.    Vaccine status unknown

## 2023-02-15 NOTE — PATIENT PROFILE ADULT - FALL HARM RISK - FALLEN IN PAST
Additional Notes: Patient had melanoma on left leg.
Quality 137: Melanoma: Continuity Of Care - Recall System: Patient information entered into a recall system that includes: target date for the next exam specified AND a process to follow up with patients regarding missed or unscheduled appointments
Detail Level: Detailed
Accidental fall

## 2023-02-15 NOTE — PATIENT PROFILE ADULT - VISION (WITH CORRECTIVE LENSES IF THE PATIENT USUALLY WEARS THEM):
Bilateral edema of lower extremity
Normal vision: sees adequately in most situations; can see medication labels, newsprint

## 2023-02-15 NOTE — ED ADULT NURSE REASSESSMENT NOTE - NS ED NURSE REASSESS COMMENT FT1
Pt received from outgoing RN in stretcher in no apparent distress. denies discomfort. Respirations even, non-labored. Skin warm, dry, color appropriate. Pt pending US. call bell within reach. Education provided to pt on how to and when to use call bell for assistance. Will continue to monitor.

## 2023-02-15 NOTE — PHYSICAL THERAPY INITIAL EVALUATION ADULT - PERTINENT HX OF CURRENT PROBLEM, REHAB EVAL
31 y/o M with pmhx of ETOH abuse, presumed alcohol induced cytopenias presented to the ED for worsening of cellulitis on his Right leg. Now admitted for worsening Right LE cellulitis with possible abscess and concern for alcohol withdrawal and concern for inability to care for self at home. US Duplex of bilateral LE (-)DVT; US Abdomen-Fatty infiltration of the liver, Biliary sludge within the gallbladder with nonspecific thickening of the gallbladder wall. No evidence of ascites.

## 2023-02-15 NOTE — PROGRESS NOTE ADULT - PROBLEM SELECTOR PLAN 1
Patient has neutropenia, tachycardia on presentation with RLE wound. This is likely from ETOH but in the right clinical setting could be sepsis. Source possibly cellulitis  - CT R LE angio on 2/15L: scan without abscess, no arterial occlusion or significant stenosis is identified. Right greater than left bilateral lower extremity soft tissue swelling. No rim-enhancing fluid collection.  - LE duplex negative for clots   - f/u blood culture   -Will cover with Vanc/Cefepime due to neutropenia

## 2023-02-15 NOTE — ED PROVIDER NOTE - OBJECTIVE STATEMENT
33yo M PMH alcohol abuse, cellulitis of the R leg, AOCD presents to the ER today c/o foot pain x 1yr, now with discharge x2 days. States he is now having discharge room his foot ongoing for the past 2 days. No F/C. Endorsing +sweating.  Last drink 10PM last night. Usually drinks 12 alcoholic beverages everyday.

## 2023-02-15 NOTE — ED PROVIDER NOTE - PHYSICAL EXAMINATION
General: Patient awake alert NAD.   HEENT: normocephalic, atraumatic, EOMI, MMM   Cardiac: RRR, S1, S2, no murmur.   LUNGS: ctab, nwob, no wheeze, rhonchi, speaking full sentences.     Abdomen: soft NT, ND, no rebound no guarding.   EXT: Moving all extremities, RLE lower anterior tibial region with erythema, warmth, TTP, clear discharge noted.   Neuro: no focal neurological deficits   Skin: warm, dry, no rash.

## 2023-02-15 NOTE — PROGRESS NOTE ADULT - PROBLEM SELECTOR PLAN 3
VSS.  Pt with no complaints of pain. Eye swelling and redness improving. Meds given as ordered. Plan of care went over with parents and pt and they verbalized understanding. Will continue to monitor. Distended abdomin. Non tender. Ab US from last visit showed hepatic steatosis  -abd US continues to show fatty liver. No s/s masses or cirrhosis - noted   - LE dopplers negative   - check CTA

## 2023-02-15 NOTE — PROGRESS NOTE ADULT - PROBLEM SELECTOR PLAN 5
FENP: No IVF, Lytes PRN, Regular diet, hold ppx last drink on 2/14   drinks 12-18 beers per day   start CIWA with IV ativan   if requires doses, consider standing regimen tomorrow

## 2023-02-16 DIAGNOSIS — R91.8 OTHER NONSPECIFIC ABNORMAL FINDING OF LUNG FIELD: ICD-10-CM

## 2023-02-16 LAB
ALBUMIN SERPL ELPH-MCNC: 3.1 G/DL — LOW (ref 3.3–5)
ALP SERPL-CCNC: 182 U/L — HIGH (ref 40–120)
ALT FLD-CCNC: 42 U/L — HIGH (ref 4–41)
ANION GAP SERPL CALC-SCNC: 12 MMOL/L — SIGNIFICANT CHANGE UP (ref 7–14)
APTT BLD: 35.6 SEC — SIGNIFICANT CHANGE UP (ref 27–36.3)
AST SERPL-CCNC: 146 U/L — HIGH (ref 4–40)
BASOPHILS # BLD AUTO: 0.04 K/UL — SIGNIFICANT CHANGE UP (ref 0–0.2)
BASOPHILS NFR BLD AUTO: 1.6 % — SIGNIFICANT CHANGE UP (ref 0–2)
BILIRUB SERPL-MCNC: 1.5 MG/DL — HIGH (ref 0.2–1.2)
BILIRUB SERPL-MCNC: 1.6 MG/DL — HIGH (ref 0.2–1.2)
BUN SERPL-MCNC: 5 MG/DL — LOW (ref 7–23)
CALCIUM SERPL-MCNC: 8.8 MG/DL — SIGNIFICANT CHANGE UP (ref 8.4–10.5)
CHLORIDE SERPL-SCNC: 98 MMOL/L — SIGNIFICANT CHANGE UP (ref 98–107)
CO2 SERPL-SCNC: 23 MMOL/L — SIGNIFICANT CHANGE UP (ref 22–31)
CREAT SERPL-MCNC: 0.36 MG/DL — LOW (ref 0.5–1.3)
CREAT SERPL-MCNC: 0.38 MG/DL — LOW (ref 0.5–1.3)
EGFR: 152 ML/MIN/1.73M2 — SIGNIFICANT CHANGE UP
EGFR: 154 ML/MIN/1.73M2 — SIGNIFICANT CHANGE UP
EOSINOPHIL # BLD AUTO: 0.12 K/UL — SIGNIFICANT CHANGE UP (ref 0–0.5)
EOSINOPHIL NFR BLD AUTO: 4.7 % — SIGNIFICANT CHANGE UP (ref 0–6)
GLUCOSE SERPL-MCNC: 90 MG/DL — SIGNIFICANT CHANGE UP (ref 70–99)
HCT VFR BLD CALC: 27.8 % — LOW (ref 39–50)
HGB BLD-MCNC: 8.4 G/DL — LOW (ref 13–17)
IANC: 1.38 K/UL — LOW (ref 1.8–7.4)
IMM GRANULOCYTES NFR BLD AUTO: 0 % — SIGNIFICANT CHANGE UP (ref 0–0.9)
INR BLD: 1.28 RATIO — HIGH (ref 0.88–1.16)
LYMPHOCYTES # BLD AUTO: 0.58 K/UL — LOW (ref 1–3.3)
LYMPHOCYTES # BLD AUTO: 22.7 % — SIGNIFICANT CHANGE UP (ref 13–44)
MAGNESIUM SERPL-MCNC: 1.3 MG/DL — LOW (ref 1.6–2.6)
MCHC RBC-ENTMCNC: 23.1 PG — LOW (ref 27–34)
MCHC RBC-ENTMCNC: 30.2 GM/DL — LOW (ref 32–36)
MCV RBC AUTO: 76.6 FL — LOW (ref 80–100)
MELD SCORE WITH DIALYSIS: 27 POINTS — SIGNIFICANT CHANGE UP
MELD SCORE WITHOUT DIALYSIS: 11 POINTS — SIGNIFICANT CHANGE UP
MONOCYTES # BLD AUTO: 0.44 K/UL — SIGNIFICANT CHANGE UP (ref 0–0.9)
MONOCYTES NFR BLD AUTO: 17.2 % — HIGH (ref 2–14)
NEUTROPHILS # BLD AUTO: 1.38 K/UL — LOW (ref 1.8–7.4)
NEUTROPHILS NFR BLD AUTO: 53.8 % — SIGNIFICANT CHANGE UP (ref 43–77)
NRBC # BLD: 0 /100 WBCS — SIGNIFICANT CHANGE UP (ref 0–0)
NRBC # FLD: 0 K/UL — SIGNIFICANT CHANGE UP (ref 0–0)
PHOSPHATE SERPL-MCNC: 3.4 MG/DL — SIGNIFICANT CHANGE UP (ref 2.5–4.5)
PLATELET # BLD AUTO: 75 K/UL — LOW (ref 150–400)
POTASSIUM SERPL-MCNC: 3.5 MMOL/L — SIGNIFICANT CHANGE UP (ref 3.5–5.3)
POTASSIUM SERPL-SCNC: 3.5 MMOL/L — SIGNIFICANT CHANGE UP (ref 3.5–5.3)
PROT SERPL-MCNC: 8.1 G/DL — SIGNIFICANT CHANGE UP (ref 6–8.3)
PROTHROM AB SERPL-ACNC: 14.9 SEC — HIGH (ref 10.5–13.4)
RBC # BLD: 3.63 M/UL — LOW (ref 4.2–5.8)
RBC # FLD: 21.7 % — HIGH (ref 10.3–14.5)
SODIUM SERPL-SCNC: 132 MMOL/L — LOW (ref 135–145)
SODIUM SERPL-SCNC: 133 MMOL/L — LOW (ref 135–145)
VANCOMYCIN TROUGH SERPL-MCNC: 4.1 UG/ML — LOW (ref 10–20)
WBC # BLD: 2.56 K/UL — LOW (ref 3.8–10.5)
WBC # FLD AUTO: 2.56 K/UL — LOW (ref 3.8–10.5)

## 2023-02-16 PROCEDURE — 99233 SBSQ HOSP IP/OBS HIGH 50: CPT

## 2023-02-16 RX ORDER — VANCOMYCIN HCL 1 G
1000 VIAL (EA) INTRAVENOUS EVERY 8 HOURS
Refills: 0 | Status: DISCONTINUED | OUTPATIENT
Start: 2023-02-16 | End: 2023-02-17

## 2023-02-16 RX ORDER — MAGNESIUM SULFATE 500 MG/ML
2 VIAL (ML) INJECTION ONCE
Refills: 0 | Status: COMPLETED | OUTPATIENT
Start: 2023-02-16 | End: 2023-02-16

## 2023-02-16 RX ORDER — VANCOMYCIN HCL 1 G
1500 VIAL (EA) INTRAVENOUS EVERY 12 HOURS
Refills: 0 | Status: DISCONTINUED | OUTPATIENT
Start: 2023-02-16 | End: 2023-02-16

## 2023-02-16 RX ORDER — MAGNESIUM OXIDE 400 MG ORAL TABLET 241.3 MG
400 TABLET ORAL ONCE
Refills: 0 | Status: COMPLETED | OUTPATIENT
Start: 2023-02-16 | End: 2023-02-16

## 2023-02-16 RX ADMIN — CEFEPIME 100 MILLIGRAM(S): 1 INJECTION, POWDER, FOR SOLUTION INTRAMUSCULAR; INTRAVENOUS at 11:46

## 2023-02-16 RX ADMIN — Medication 2 MILLIGRAM(S): at 12:42

## 2023-02-16 RX ADMIN — CEFEPIME 100 MILLIGRAM(S): 1 INJECTION, POWDER, FOR SOLUTION INTRAMUSCULAR; INTRAVENOUS at 04:19

## 2023-02-16 RX ADMIN — Medication 105 MILLIGRAM(S): at 07:40

## 2023-02-16 RX ADMIN — Medication 250 MILLIGRAM(S): at 16:57

## 2023-02-16 RX ADMIN — Medication 1 TABLET(S): at 11:46

## 2023-02-16 RX ADMIN — Medication 25 GRAM(S): at 09:43

## 2023-02-16 RX ADMIN — Medication 1 MILLIGRAM(S): at 11:46

## 2023-02-16 RX ADMIN — Medication 166.67 MILLIGRAM(S): at 06:04

## 2023-02-16 RX ADMIN — MAGNESIUM OXIDE 400 MG ORAL TABLET 400 MILLIGRAM(S): 241.3 TABLET ORAL at 16:57

## 2023-02-16 NOTE — PROGRESS NOTE ADULT - PROBLEM SELECTOR PLAN 4
Distended abdomin. Non tender. Ab US from last visit showed hepatic steatosis  -abd US continues to show fatty liver. No s/s masses or cirrhosis

## 2023-02-16 NOTE — PROGRESS NOTE ADULT - PROBLEM SELECTOR PLAN 6
- CT on 2/15: 2.1 cm patchy opacity is noted in the left lower lobe. Etiology is unclear. Follow-up CT scan is recommended in 3 months to ensure complete resolution.

## 2023-02-16 NOTE — PROGRESS NOTE ADULT - SUBJECTIVE AND OBJECTIVE BOX
Diana Rabago MD  [facility] Division of Hospital Medicine  Pager: [phone]  Available via MS Teams    SUBJECTIVE / OVERNIGHT EVENTS:    No new complaints   Doing okay with a withdrawal perspective   continues to have RLE pain     MEDICATIONS  (STANDING):  cefepime   IVPB 2000 milliGRAM(s) IV Intermittent every 8 hours  cefepime   IVPB      folic acid 1 milliGRAM(s) Oral daily  influenza   Vaccine 0.5 milliLiter(s) IntraMuscular once  lactated ringers. 1000 milliLiter(s) (125 mL/Hr) IV Continuous <Continuous>  multivitamin 1 Tablet(s) Oral daily  thiamine IVPB 500 milliGRAM(s) IV Intermittent every 24 hours  vancomycin  IVPB 1250 milliGRAM(s) IV Intermittent every 12 hours    MEDICATIONS  (PRN):  acetaminophen     Tablet .. 650 milliGRAM(s) Oral every 6 hours PRN Temp greater or equal to 38C (100.4F), Mild Pain (1 - 3)  aluminum hydroxide/magnesium hydroxide/simethicone Suspension 30 milliLiter(s) Oral every 4 hours PRN Dyspepsia  LORazepam     Tablet 2 milliGRAM(s) Oral every 2 hours PRN CIWA-Ar score increase by 2 points and a total score of 7 or less  LORazepam     Tablet 2 milliGRAM(s) Oral every 1 hour PRN CIWA-Ar score 8 or greater  melatonin 3 milliGRAM(s) Oral at bedtime PRN Insomnia  ondansetron Injectable 4 milliGRAM(s) IV Push every 8 hours PRN Nausea and/or Vomiting      I&O's Summary    2023 07:01  -  2023 14:20  --------------------------------------------------------  IN: 350 mL / OUT: 0 mL / NET: 350 mL        PHYSICAL EXAM:  Vital Signs Last 24 Hrs  T(C): 36.8 (2023 11:45), Max: 37.7 (15 Feb 2023 16:31)  T(F): 98.2 (2023 11:45), Max: 99.8 (15 Feb 2023 16:31)  HR: 87 (2023 11:45) (87 - 95)  BP: 123/73 (2023 11:45) (123/73 - 143/80)  BP(mean): --  RR: 19 (2023 11:45) (17 - 19)  SpO2: 100% (2023 11:45) (96% - 100%)    Parameters below as of 2023 11:45  Patient On (Oxygen Delivery Method): room air      CONSTITUTIONAL: NAD, well-developed   EYES: conjunctiva and sclera clear  ENMT: Moist oral mucosa   NECK: Supple   RESPIRATORY: Normal respiratory effort; lungs are clear to auscultation bilaterally  CARDIOVASCULAR: Regular rate and rhythm, normal S1 and S2, no murmur/rub/gallop; Peripheral pulses are 2+ bilaterally  ABDOMEN: Nontender to palpation, normoactive bowel sounds, no rebound/guarding   MUSCULOSKELETAL: No lower extremity edema. RLE with anterior shin wound   PSYCH: A+O to person, place, and time; affect appropriate  NEUROLOGY: no gross sensory or motor deficits   SKIN: No rashes      LABS:                        8.4    2.56  )-----------( 75       ( 2023 06:20 )             27.8     02-16    133<L>  |  98  |  5<L>  ----------------------------<  90  3.5   |  23  |  0.38<L>    Ca    8.8      2023 06:20  Phos  3.4     02-16  Mg     1.30     02-16    TPro  8.1  /  Alb  3.1<L>  /  TBili  1.6<H>  /  DBili  x   /  AST  146<H>  /  ALT  42<H>  /  AlkPhos  182<H>  02-16    PT/INR - ( 2023 06:20 )   PT: 14.9 sec;   INR: 1.28 ratio         PTT - ( 2023 06:20 )  PTT:35.6 sec      Urinalysis Basic - ( 15 Feb 2023 09:51 )    Color: Light Yellow / Appearance: Clear / S.022 / pH: x  Gluc: x / Ketone: Negative  / Bili: Negative / Urobili: <2 mg/dL   Blood: x / Protein: Trace / Nitrite: Negative   Leuk Esterase: Negative / RBC: x / WBC x   Sq Epi: x / Non Sq Epi: x / Bacteria: x        Culture - Blood (collected 15 Feb 2023 07:44)  Source: .Blood Blood-Peripheral  Preliminary Report (2023 13:01):    No growth to date.    Culture - Blood (collected 15 Feb 2023 07:40)  Source: .Blood Blood-Peripheral  Preliminary Report (2023 13:01):    No growth to date.      SARS-CoV-2: NotDetec (15 Feb 2023 03:32)  SARS-CoV-2: NotDetec (19 Dec 2022 01:35)  COVID-19 PCR: NotDetec (18 Dec 2022 17:00)      RADIOLOGY & ADDITIONAL TESTS:  Other Results Reviewed Today: BMP with stable Cr, CBC with stable Hg     COORDINATION OF CARE:  Communication: discussed plan of care with ACP

## 2023-02-16 NOTE — PROGRESS NOTE ADULT - PROBLEM SELECTOR PLAN 1
Patient has neutropenia, tachycardia on presentation with RLE wound. This is likely from ETOH but in the right clinical setting could be sepsis. Source possibly cellulitis  - CT R LE angio on 2/15L: scan without abscess, no arterial occlusion or significant stenosis is identified. Right greater than left bilateral lower extremity soft tissue swelling. No rim-enhancing fluid collection.  - LE duplex negative for clots   - f/u blood culture   -Will cover with Vanc/Cefepime due to neutropenia  - wound care consult

## 2023-02-16 NOTE — PROGRESS NOTE ADULT - PROBLEM SELECTOR PLAN 5
last drink on 2/14   drinks 12-18 beers per day   start CIWA with IV ativan   if requires doses, consider standing regimen

## 2023-02-16 NOTE — PROGRESS NOTE ADULT - PROBLEM SELECTOR PLAN 2
These hematological changes were attributed to ETOH during last admission  - LDH mildly elevated, haptoglobin normal, Iron studies c/w QUITA, Folate/B12 both WNL,   - avelino pending   - reticulocyte index hypoproliferative (makes sense give severe malnutrition and QUITA)   - Hold DVT ppx for now  - Does not meet 4Ts for HIT  -HIV negative   -consider hematology c/s if continues to worsen. Currently stable

## 2023-02-17 ENCOUNTER — TRANSCRIPTION ENCOUNTER (OUTPATIENT)
Age: 33
End: 2023-02-17

## 2023-02-17 LAB
ALBUMIN SERPL ELPH-MCNC: 3.1 G/DL — LOW (ref 3.3–5)
ALP SERPL-CCNC: 180 U/L — HIGH (ref 40–120)
ALT FLD-CCNC: 39 U/L — SIGNIFICANT CHANGE UP (ref 4–41)
ANION GAP SERPL CALC-SCNC: 13 MMOL/L — SIGNIFICANT CHANGE UP (ref 7–14)
AST SERPL-CCNC: 120 U/L — HIGH (ref 4–40)
BASOPHILS # BLD AUTO: 0.06 K/UL — SIGNIFICANT CHANGE UP (ref 0–0.2)
BASOPHILS NFR BLD AUTO: 1.8 % — SIGNIFICANT CHANGE UP (ref 0–2)
BILIRUB SERPL-MCNC: 1.1 MG/DL — SIGNIFICANT CHANGE UP (ref 0.2–1.2)
BUN SERPL-MCNC: 7 MG/DL — SIGNIFICANT CHANGE UP (ref 7–23)
CALCIUM SERPL-MCNC: 8.9 MG/DL — SIGNIFICANT CHANGE UP (ref 8.4–10.5)
CHLORIDE SERPL-SCNC: 102 MMOL/L — SIGNIFICANT CHANGE UP (ref 98–107)
CO2 SERPL-SCNC: 20 MMOL/L — LOW (ref 22–31)
CREAT SERPL-MCNC: 0.4 MG/DL — LOW (ref 0.5–1.3)
EGFR: 150 ML/MIN/1.73M2 — SIGNIFICANT CHANGE UP
EOSINOPHIL # BLD AUTO: 0.26 K/UL — SIGNIFICANT CHANGE UP (ref 0–0.5)
EOSINOPHIL NFR BLD AUTO: 7.7 % — HIGH (ref 0–6)
GLUCOSE SERPL-MCNC: 92 MG/DL — SIGNIFICANT CHANGE UP (ref 70–99)
HCT VFR BLD CALC: 29 % — LOW (ref 39–50)
HGB BLD-MCNC: 8.5 G/DL — LOW (ref 13–17)
IANC: 1.88 K/UL — SIGNIFICANT CHANGE UP (ref 1.8–7.4)
IMM GRANULOCYTES NFR BLD AUTO: 0.3 % — SIGNIFICANT CHANGE UP (ref 0–0.9)
LYMPHOCYTES # BLD AUTO: 0.69 K/UL — LOW (ref 1–3.3)
LYMPHOCYTES # BLD AUTO: 20.5 % — SIGNIFICANT CHANGE UP (ref 13–44)
MAGNESIUM SERPL-MCNC: 1.7 MG/DL — SIGNIFICANT CHANGE UP (ref 1.6–2.6)
MCHC RBC-ENTMCNC: 22.9 PG — LOW (ref 27–34)
MCHC RBC-ENTMCNC: 29.3 GM/DL — LOW (ref 32–36)
MCV RBC AUTO: 78.2 FL — LOW (ref 80–100)
MONOCYTES # BLD AUTO: 0.46 K/UL — SIGNIFICANT CHANGE UP (ref 0–0.9)
MONOCYTES NFR BLD AUTO: 13.7 % — SIGNIFICANT CHANGE UP (ref 2–14)
NEUTROPHILS # BLD AUTO: 1.88 K/UL — SIGNIFICANT CHANGE UP (ref 1.8–7.4)
NEUTROPHILS NFR BLD AUTO: 56 % — SIGNIFICANT CHANGE UP (ref 43–77)
NRBC # BLD: 0 /100 WBCS — SIGNIFICANT CHANGE UP (ref 0–0)
NRBC # FLD: 0 K/UL — SIGNIFICANT CHANGE UP (ref 0–0)
PHOSPHATE SERPL-MCNC: 3 MG/DL — SIGNIFICANT CHANGE UP (ref 2.5–4.5)
PLATELET # BLD AUTO: 88 K/UL — LOW (ref 150–400)
POTASSIUM SERPL-MCNC: 3.6 MMOL/L — SIGNIFICANT CHANGE UP (ref 3.5–5.3)
POTASSIUM SERPL-SCNC: 3.6 MMOL/L — SIGNIFICANT CHANGE UP (ref 3.5–5.3)
PROT SERPL-MCNC: 8 G/DL — SIGNIFICANT CHANGE UP (ref 6–8.3)
RBC # BLD: 3.71 M/UL — LOW (ref 4.2–5.8)
RBC # FLD: 22.3 % — HIGH (ref 10.3–14.5)
SODIUM SERPL-SCNC: 135 MMOL/L — SIGNIFICANT CHANGE UP (ref 135–145)
WBC # BLD: 3.36 K/UL — LOW (ref 3.8–10.5)
WBC # FLD AUTO: 3.36 K/UL — LOW (ref 3.8–10.5)

## 2023-02-17 PROCEDURE — 99223 1ST HOSP IP/OBS HIGH 75: CPT

## 2023-02-17 PROCEDURE — 99232 SBSQ HOSP IP/OBS MODERATE 35: CPT

## 2023-02-17 RX ORDER — ENOXAPARIN SODIUM 100 MG/ML
40 INJECTION SUBCUTANEOUS EVERY 24 HOURS
Refills: 0 | Status: DISCONTINUED | OUTPATIENT
Start: 2023-02-17 | End: 2023-02-24

## 2023-02-17 RX ADMIN — Medication 1 TABLET(S): at 12:23

## 2023-02-17 RX ADMIN — Medication 1 MILLIGRAM(S): at 12:23

## 2023-02-17 RX ADMIN — CEFEPIME 100 MILLIGRAM(S): 1 INJECTION, POWDER, FOR SOLUTION INTRAMUSCULAR; INTRAVENOUS at 04:00

## 2023-02-17 RX ADMIN — Medication 100 MILLIGRAM(S): at 19:05

## 2023-02-17 RX ADMIN — Medication 250 MILLIGRAM(S): at 09:27

## 2023-02-17 RX ADMIN — CEFEPIME 100 MILLIGRAM(S): 1 INJECTION, POWDER, FOR SOLUTION INTRAMUSCULAR; INTRAVENOUS at 12:23

## 2023-02-17 RX ADMIN — Medication 250 MILLIGRAM(S): at 01:09

## 2023-02-17 RX ADMIN — Medication 105 MILLIGRAM(S): at 06:46

## 2023-02-17 RX ADMIN — ENOXAPARIN SODIUM 40 MILLIGRAM(S): 100 INJECTION SUBCUTANEOUS at 16:59

## 2023-02-17 NOTE — PROGRESS NOTE ADULT - PROBLEM SELECTOR PLAN 1
Patient has neutropenia, tachycardia on presentation with RLE wound. This is likely from ETOH but in the right clinical setting could be sepsis.   - CT R LE angio on 2/15L: scan without abscess, no arterial occlusion or significant stenosis is identified. Right greater than left bilateral lower extremity soft tissue swelling. No rim-enhancing fluid collection.  - LE duplex negative for clots   - blood culture NGTD   - wound care recs appreciated, unlikely cellulitis  - will finish antibiotic course with doxycycline (end date 2/21)  epe given neutropenia on admission

## 2023-02-17 NOTE — PROGRESS NOTE ADULT - PROBLEM SELECTOR PLAN 4
Distended abdomin. Non tender. Ab US from last visit showed hepatic steatosis  -abd US continues to show fatty liver. No s/s masses or cirrhosis  -OP follow up

## 2023-02-17 NOTE — DISCHARGE NOTE PROVIDER - NSDCFUADDAPPT_GEN_ALL_CORE_FT
Follow up with Gastroenterology , Psychiatry and Wound Care in 2 weeks, call for Appointment     Follow up as outpatient at [facility] [address] [place] [address] [phone] or Wound Center at [facility], [address] [place] [address] ([phone]) in 2 weeks , call for appointment

## 2023-02-17 NOTE — DISCHARGE NOTE PROVIDER - HOSPITAL COURSE
33 y/o M with pmhx of ETOH abuse, presumed alcohol induced cytopenias presented to the ED for worsening of cellulitis on his R leg. Now admitted for worsening RLE cellulitis with possible abscess and concern for alcohol withdrawal and concern for inability to care for self at home.     Acute sepsis.   -neutropenia, tachycardia on presentation with RLE wound. likely from ETOH   - CT R LE angio on 2/15L: scan without abscess, no arterial occlusion or significant stenosis is identified. Right greater than left bilateral lower extremity soft tissue swelling. No rim-enhancing fluid collection.  - LE duplex negative & blood culture NGTD   - wound care recs appreciated, unlikely cellulitis  - will finish antibiotic course with doxycycline (end date 2/21)  epe given neutropenia on admission.    Pancytopenia.   - LDH mildly elevated, haptoglobin normal, Iron studies c/w QUITA, Folate/B12 both WNL, reticulocyte index hypoproliferative (makes sense give severe malnutrition and QUITA)   - Does not meet 4Ts for HIT  -HIV negative   - restart DVT ppx on 2/17.    Positive D-dimer.   - LE dopplers negative & CTA negative.    Hepatic steatosis.   -Distended abdomin. Non tender. Ab US from last visit showed hepatic steatosis  -abd US continues to show fatty liver. No s/s masses or cirrhosis  -OP follow up.    ETOH abuse.   -last drink on 2/14   drinks 12-18 beers per day   start CIWA with IV ativan   if requires doses, consider standing regimen.    Abnormal CT scan, lung.    - CT on 2/15: 2.1 cm patchy opacity is noted in the left lower lobe. Etiology is unclear. Follow-up CT scan is recommended in 3 months to ensure complete resolution.   33 y/o M with pmhx of ETOH abuse, presumed alcohol induced cytopenias presented to the ED for worsening of cellulitis on his R leg. Now admitted for worsening RLE cellulitis with possible abscess and concern for alcohol withdrawal and concern for inability to care for self at home.     Acute sepsis.   -neutropenia, tachycardia on presentation with RLE wound. likely from ETOH   - CT R LE angio on 2/15L: scan without abscess, no arterial occlusion or significant stenosis is identified. Right greater than left bilateral lower extremity soft tissue swelling. No rim-enhancing fluid collection.  - LE duplex negative & blood culture NGTD   - wound care recs appreciated, unlikely cellulitis  - will finish antibiotic course with doxycycline (end date 2/21)  epe given neutropenia on admission.    Pancytopenia.   - LDH mildly elevated, haptoglobin normal, Iron studies c/w QUITA, Folate/B12 both WNL, reticulocyte index hypoproliferative (makes sense give severe malnutrition and QUITA)   - Does not meet 4Ts for HIT  -HIV negative   - restart DVT ppx on 2/17.    Positive D-dimer.   - LE dopplers negative & CTA negative.    Hepatic steatosis.   -Distended abdomin. Non tender. Ab US from last visit showed hepatic steatosis  -abd US continues to show fatty liver. No s/s masses or cirrhosis  -OP follow up.    ETOH abuse.   -last drink on 2/14   drinks 12-18 beers per day   start CIWA with IV ativan   if requires doses, consider standing regimen.    Abnormal CT scan, lung.    - CT on 2/15: 2.1 cm patchy opacity is noted in the left lower lobe. Etiology is unclear. Follow-up CT scan is recommended in 3 months to ensure complete resolution.    On 2/22/2023this case was reviewed with MD Basil the patient is medically stable and optimized for discharge. All medications were reviewed and prescriptions were sent to mutually agreed upon pharmacy.     31 y/o M with pmhx of ETOH abuse, presumed alcohol induced cytopenias presented to the ED for worsening of cellulitis on his R leg. Now admitted for worsening RLE cellulitis with possible abscess and concern for alcohol withdrawal and concern for inability to care for self at home.     Acute sepsis.   -neutropenia, tachycardia on presentation with RLE wound. likely from ETOH   - CT R LE angio on 2/15L: scan without abscess, no arterial occlusion or significant stenosis is identified. Right greater than left bilateral lower extremity soft tissue swelling. No rim-enhancing fluid collection.  - LE duplex negative & blood culture NGTD   - wound care recs appreciated, unlikely cellulitis  - will finish antibiotic course with doxycycline (end date 2/21)  epe given neutropenia on admission.    Pancytopenia.   - LDH mildly elevated, haptoglobin normal, Iron studies c/w QUITA, Folate/B12 both WNL, reticulocyte index hypoproliferative (makes sense give severe malnutrition and QUITA)   - Does not meet 4Ts for HIT  -HIV negative     Positive D-dimer.   - LE dopplers negative & CTA negative.    Hepatic steatosis.   -Distended abdomin. Non tender. Ab US from last visit showed hepatic steatosis  -abd US continues to show fatty liver. No s/s masses or cirrhosis  -OP follow up.    ETOH abuse.   -last drink on 2/14   drinks 12-18 beers per day   start CIWA with IV ativan   if requires doses, consider standing regimen.    Abnormal CT scan, lung.    - CT on 2/15: 2.1 cm patchy opacity is noted in the left lower lobe. Etiology is unclear. Follow-up CT scan is recommended in 3 months to ensure complete resolution.    On 2/23/2023this case was reviewed with MD Basil the patient is medically stable and optimized for discharge. All medications were reviewed and prescriptions were sent to mutually agreed upon pharmacy.

## 2023-02-17 NOTE — DISCHARGE NOTE PROVIDER - NSDCCPCAREPLAN_GEN_ALL_CORE_FT
PRINCIPAL DISCHARGE DIAGNOSIS  Diagnosis: Cellulitis  Assessment and Plan of Treatment: Complete antibiotic therapy as directed.  Monitor for any further signs and symptoms of further infection, including but not limited to, fevers/chills, shortness of breath, increased heart rate, dizziness, or abrupt changes in mental status. Continue wound care as directed.      SECONDARY DISCHARGE DIAGNOSES  Diagnosis: Abnormal CT scan, lung  Assessment and Plan of Treatment: CT scan of your  lungs showed a 2.1cm nodule/lesion in the left lower lobe. Please follow up with your PCP for a repeat CT scan in 3 months to monitor the lungs.    Diagnosis: ETOH abuse  Assessment and Plan of Treatment: Please abstain from ingesting alcohol in order to optimize your health and prevent adverse events. Continue to supplement with vitamins and follow-up with your primary care provider for further medical care. If you need immediate assistance with substance abuse you may contact the [facility] by calling [phone] open 9am-7pm.      Diagnosis: Hepatic steatosis  Assessment and Plan of Treatment: The ultrasound of your abdomen shows fatty liver and no mass or cirrhosis (hardening/scarring) but please promptly follow up with PCP or gastroentrology to montior your liver function.     PRINCIPAL DISCHARGE DIAGNOSIS  Diagnosis: Cellulitis  Assessment and Plan of Treatment: You have completed a course of antibiotics. Monitor for any further signs and symptoms of further infection, including but not limited to, fevers/chills, shortness of breath, increased heart rate, dizziness, or abrupt changes in mental status. Continue wound care as directed.      SECONDARY DISCHARGE DIAGNOSES  Diagnosis: Hepatic steatosis  Assessment and Plan of Treatment: The ultrasound of your abdomen shows fatty liver and no mass or cirrhosis (hardening/scarring) but please promptly follow up with PCP or gastroentrology to montior your liver function.    Diagnosis: Abnormal CT scan, lung  Assessment and Plan of Treatment: CT scan of your  lungs showed a 2.1 cm nodule/lesion in the left lower lobe. Please follow up with your PCP for a repeat CT scan in 3 months to monitor the lungs.    Diagnosis: ETOH abuse  Assessment and Plan of Treatment: Please abstain from ingesting alcohol in order to optimize your health and prevent adverse events. Continue to supplement with vitamins and follow-up with your primary care provider for further medical care. If you need immediate assistance with substance abuse you may contact the [facility] by calling [phone] open 9am-7pm.

## 2023-02-17 NOTE — PROGRESS NOTE ADULT - PROBLEM SELECTOR PLAN 2
These hematological changes were attributed to ETOH during last admission  - LDH mildly elevated, haptoglobin normal, Iron studies c/w QUITA, Folate/B12 both WNL,   - reticulocyte index hypoproliferative (makes sense give severe malnutrition and QUITA)   - Does not meet 4Ts for HIT  -HIV negative   - now stable to improving   - restart DVT ppx on 2/17

## 2023-02-17 NOTE — DISCHARGE NOTE PROVIDER - NSFOLLOWUPCLINICS_GEN_ALL_ED_FT
[facility]  Wound Care  [address] [place] [address]  Phone: [phone]  Fax: [phone]  Follow Up Time: 2 weeks    [facility]  Behavioral Health  [address] [place] [id number]  Phone: [phone]  Fax:     [facility]  Gastroenterology  [address] [place] [address]  Phone: [phone]  Fax:   Follow Up Time: 2 weeks

## 2023-02-17 NOTE — CONSULT NOTE ADULT - ASSESSMENT
Assessment/Plan: 33 y/o M with pmhx of ETOH abuse, presumed alcohol induced cytopenias presented to the ED for worsening of cellulitis on his R leg. Now admitted for worsening RLE cellulitis with possible abscess and concern for alcohol withdrawal and concern for inability to care for self at home.    Wound Consult requested to assist w/ management of RLE wound. Patient known to wound care surgery services seen last on 22 for RLE wound. Previously seen in 22 for same. Patient endorses he is currently homeless.    RLE chronic trauma wound with surrounding cellulitis (s/p biopsy of RLE wound on  to r/o neoplasm)  (Of note, patient identified as Priscila Horton in hospital admission in May 2022,  02-Mar-2022) previous MR number [id number])  -CT of right leg- Left lower extremity soft tissue swelling and skin thickening. No arterial occlusions. No drainable fluid collection. Remainder of findings as per primary team.   -Tissue type stable with pink granular tissue   -No drainable abscess   -No purulence   -Bilateral feet with poor hygiene, clean with soap and water daily.   -trace edema in anterior lower leg, no erythema. No associated cellulitis.   -US duplex negative for DVT.   -Compression BLE  -BLE elevation  Moisturize intact skin w/ SWEEN moisturizer daily. Avoid between toes.   -Topical Recommendations: Clean wound and periwound skin with NS. Pat dry. Apply Betadine wipe to open ulcer, pat dry. Apply Sween 24 moisturizer to bilateral legs, avoid between toes or over open ulcer).Cover with ABD pad and Kerlix. Compress with ACE wraps. Change daily.     Alcoholism   -Management as per primary team   -Advised patient on possible associated complications, patient verbalized understanding    Elevated D dimer   -Management as per primary team   -CT of the chest negative for PE   -Covid 19 negative     Continue turning and positioning w/ offloading assistive devices as per protocol  Continue w/ Pericare as per protocol  Waffle Cushion to chair when oob to chair  Continue w/ low air loss fluidized bed surface     Care as per medicine, will follow w/ you. Please reconsult if needed it.     Upon discharge f/u as outpatient at [facility] [address] [place] [address] [phone] or Wound Center at [facility], [facility] [place] [address] ([phone]).   Seen w/ attng Del pin and D/w team    Thank you for this consult  PATRIA Peter-BC, CWLISAN (pager #[phone] or available in MS)    If after 4PM or before 7:30AM on Mon-Friday or weekend/holiday please contact general surgery for urgent matters.   Team A- [id number]   Team B- [id number]  For non-urgent matters e-mail [email or web address]     We spent 55 minutes face to face with this patient of which more than 50% of the time was spent counseling & coordinating care of this pt   Assessment/Plan: 31 y/o M with pmhx of ETOH abuse, presumed alcohol induced cytopenias presented to the ED for worsening of cellulitis on his R leg. Now admitted for worsening RLE cellulitis with possible abscess and concern for alcohol withdrawal and concern for inability to care for self at home.    Wound Consult requested to assist w/ management of RLE wound. Patient known to wound care surgery services seen last on 22 for RLE wound. Previously seen in 22 for same. Patient endorses he is currently homeless.    RLE chronic trauma wound with surrounding cellulitis (s/p biopsy of RLE wound on  to r/o neoplasm)  (Of note, patient identified as Priscila Horton in hospital admission in May 2022,  02-Mar-2022) previous MR number [id number])  -CT of right leg- Left lower extremity soft tissue swelling and skin thickening. No arterial occlusions. No drainable fluid collection. Remainder of findings as per primary team.   -Tissue type stable with pink granular tissue   -No drainable abscess   -No purulence   -Bilateral feet with poor hygiene, clean with soap and water daily.   -trace edema in anterior lower leg, no erythema. No associated cellulitis.   -US duplex negative for DVT.   -Compression BLE  -BLE elevation  Moisturize intact skin w/ SWEEN moisturizer daily. Avoid between toes.   -Topical Recommendations: Clean wound and periwound skin with NS. Pat dry. Apply Betadine wipe to open ulcer, pat dry. Apply Sween 24 moisturizer to bilateral legs, avoid between toes or over open ulcer).Cover with ABD pad and Kerlix. Compress with ACE wraps. Change daily.     Alcoholism   -Management as per primary team   -Advised patient on possible associated complications, patient verbalized understanding    Elevated D dimer   -Management as per primary team   -CT of the chest negative for PE   -Covid 19 negative     Continue turning and positioning w/ offloading assistive devices as per protocol  Continue w/ Pericare as per protocol  Waffle Cushion to chair when oob to chair  Continue w/ low air loss fluidized bed surface     Care as per medicine, will follow w/ you. Please reconsult if needed it.     Upon discharge f/u as outpatient at [facility] [address] [place] [address] [phone] or Wound Center at [facility], [facility]-[address] [place] [address] ([phone]).   Seen w/ attng Del pin and D/w team    Thank you for this consult  PATRIA Peter-BC, CWLISAN (pager #[phone] or available in MS)    If after 4PM or before 7:30AM on Mon-Friday or weekend/holiday please contact general surgery for urgent matters.   Team A- [id number]   Team B- [id number]  For non-urgent matters e-mail [email or web address]

## 2023-02-17 NOTE — DISCHARGE NOTE PROVIDER - PROVIDER TOKENS
FREE:[LAST:[Follow up with your primary doctor],FIRST:[if you do not have],PHONE:[(   )    -],FAX:[(   )    -],ADDRESS:[Follow up with [facility]   [phone], call for appointment in 2 weeks]]

## 2023-02-17 NOTE — DISCHARGE NOTE PROVIDER - ATTENDING ATTESTATION STATEMENT
I have personally seen and examined the patient. I have collaborated with and supervised the
The patient is a 12y Male complaining of flu-like symptoms.

## 2023-02-17 NOTE — PROGRESS NOTE ADULT - SUBJECTIVE AND OBJECTIVE BOX
Diana Rabago MD  [facility] Division of Hospital Medicine  Pager: [phone]  Available via MS Teams    SUBJECTIVE / OVERNIGHT EVENTS:    No chest pain   Improved RLE pain   No other complaints     MEDICATIONS  (STANDING):  enoxaparin Injectable 40 milliGRAM(s) SubCutaneous every 24 hours  folic acid 1 milliGRAM(s) Oral daily  influenza   Vaccine 0.5 milliLiter(s) IntraMuscular once  lactated ringers. 1000 milliLiter(s) (125 mL/Hr) IV Continuous <Continuous>  multivitamin 1 Tablet(s) Oral daily  thiamine IVPB 500 milliGRAM(s) IV Intermittent every 24 hours    MEDICATIONS  (PRN):  acetaminophen     Tablet .. 650 milliGRAM(s) Oral every 6 hours PRN Temp greater or equal to 38C (100.4F), Mild Pain (1 - 3)  aluminum hydroxide/magnesium hydroxide/simethicone Suspension 30 milliLiter(s) Oral every 4 hours PRN Dyspepsia  LORazepam     Tablet 2 milliGRAM(s) Oral every 2 hours PRN CIWA-Ar score increase by 2 points and a total score of 7 or less  LORazepam     Tablet 2 milliGRAM(s) Oral every 1 hour PRN CIWA-Ar score 8 or greater  melatonin 3 milliGRAM(s) Oral at bedtime PRN Insomnia  ondansetron Injectable 4 milliGRAM(s) IV Push every 8 hours PRN Nausea and/or Vomiting      I&O's Summary    16 Feb 2023 07:01  -  17 Feb 2023 07:00  --------------------------------------------------------  IN: 350 mL / OUT: 400 mL / NET: -50 mL    17 Feb 2023 07:01  -  17 Feb 2023 12:44  --------------------------------------------------------  IN: 0 mL / OUT: 780 mL / NET: -780 mL        PHYSICAL EXAM:  Vital Signs Last 24 Hrs  T(C): 37.2 (17 Feb 2023 06:41), Max: 37.6 (16 Feb 2023 22:00)  T(F): 98.9 (17 Feb 2023 06:41), Max: 99.7 (16 Feb 2023 22:00)  HR: 90 (17 Feb 2023 06:41) (90 - 102)  BP: 111/67 (17 Feb 2023 06:41) (111/67 - 132/72)  BP(mean): --  RR: 18 (17 Feb 2023 06:41) (17 - 18)  SpO2: 98% (17 Feb 2023 06:41) (97% - 100%)    Parameters below as of 17 Feb 2023 06:41  Patient On (Oxygen Delivery Method): room air      CONSTITUTIONAL: NAD, well-developed   EYES: conjunctiva and sclera clear  ENMT: Moist oral mucosa   NECK: Supple   RESPIRATORY: Normal respiratory effort; lungs are clear to auscultation bilaterally  CARDIOVASCULAR: Regular rate and rhythm, normal S1 and S2, no murmur/rub/gallop; Peripheral pulses are 2+ bilaterally  ABDOMEN: Nontender to palpation, normoactive bowel sounds, no rebound/guarding   MUSCULOSKELETAL: RLE ace wrapped   PSYCH: A+O to person, place, and time; affect appropriate  NEUROLOGY: no gross sensory or motor deficits   SKIN: No rashes    LABS:                        8.5    3.36  )-----------( 88       ( 17 Feb 2023 05:38 )             29.0     02-17    135  |  102  |  7   ----------------------------<  92  3.6   |  20<L>  |  0.40<L>    Ca    8.9      17 Feb 2023 05:38  Phos  3.0     02-17  Mg     1.70     02-17    TPro  8.0  /  Alb  3.1<L>  /  TBili  1.1  /  DBili  x   /  AST  120<H>  /  ALT  39  /  AlkPhos  180<H>  02-17    PT/INR - ( 16 Feb 2023 06:20 )   PT: 14.9 sec;   INR: 1.28 ratio         PTT - ( 16 Feb 2023 06:20 )  PTT:35.6 sec          Culture - Blood (collected 15 Feb 2023 07:44)  Source: .Blood Blood-Peripheral  Preliminary Report (16 Feb 2023 13:01):    No growth to date.    Culture - Blood (collected 15 Feb 2023 07:40)  Source: .Blood Blood-Peripheral  Preliminary Report (16 Feb 2023 13:01):    No growth to date.      SARS-CoV-2: NotDetec (15 Feb 2023 03:32)  SARS-CoV-2: NotDetec (19 Dec 2022 01:35)  COVID-19 PCR: NotDetec (18 Dec 2022 17:00)      RADIOLOGY & ADDITIONAL TESTS:  Other Results Reviewed Today: BMP with stable Cr, CBC with stable Hg     COORDINATION OF CARE:  Communication: discussed plan of care with ACP

## 2023-02-17 NOTE — DISCHARGE NOTE PROVIDER - CARE PROVIDER_API CALL
His PCP is Dr Peck    Follow up with your primary doctor, if you do not have  Follow up with [facility]   [phone], call for appointment in 2 weeks  Phone: (   )    -  Fax: (   )    -  Follow Up Time:

## 2023-02-17 NOTE — CONSULT NOTE ADULT - SUBJECTIVE AND OBJECTIVE BOX
Wound SURGERY CONSULT NOTE    HPI:33 y/o M with pmhx of ETOH abuse, presumed alcohol induced cytopenias presented to the ED for worsening of cellulitis on his R leg. The patient is undomicled and does not have outpatient follow up. Patient is intoxicated at time of interview with  [id number]. A/O x2-3 but able to follow basic commands. He was recently here at [facility] in Dec 2022 for similar complaints and D/c home with PO doxy and discharged to a shelter. Per chart review, patient was admitted under alternate aliases. Patient’s last drink was last night around midnight prior to coming to the hospital. Per chart review, he drank 1 can of beer and a few bottles of liquor previously admission but denies hx of withdrawals. Denies chest pain, dyspnea, diarrhea, chest pain, fever, chills.  In the ED, VSS, Labs: wbc 2.36 (920 neutrophils), PLT 86 (baseline 152), ALT//44. Given Vanc in ED. (15 Feb 2023 06:50)    Wound consult requested to assist w/ management of chronic RLE wound. Patient alert, watching TV. Recognized wound care team. Patient denies right leg symptoms at this time. Endorses he is currently homeless. Reports he was recently in [facility] for right leg wound and as per patient was ever admitted "they said I didn't have an infection in my leg". Reports he had abdominal pain but feels better now.   Interviewed consulted in [place] as clinician speaks native language.     Current Diet: Diet, Regular (02-15-23 @ 06:42)      PAST MEDICAL & SURGICAL HISTORY:  ETOH abuse  Cellulitis, leg    No significant past surgical history    REVIEW OF SYSTEMS: General/ Skin: See PMH and HPI.   All other systems negative    MEDICATIONS  (STANDING):  cefepime   IVPB 2000 milliGRAM(s) IV Intermittent every 8 hours  cefepime   IVPB      folic acid 1 milliGRAM(s) Oral daily  influenza   Vaccine 0.5 milliLiter(s) IntraMuscular once  lactated ringers. 1000 milliLiter(s) (125 mL/Hr) IV Continuous <Continuous>  multivitamin 1 Tablet(s) Oral daily  thiamine IVPB 500 milliGRAM(s) IV Intermittent every 24 hours  vancomycin  IVPB 1000 milliGRAM(s) IV Intermittent every 8 hours    MEDICATIONS  (PRN):  acetaminophen     Tablet .. 650 milliGRAM(s) Oral every 6 hours PRN Temp greater or equal to 38C (100.4F), Mild Pain (1 - 3)  aluminum hydroxide/magnesium hydroxide/simethicone Suspension 30 milliLiter(s) Oral every 4 hours PRN Dyspepsia  LORazepam     Tablet 2 milliGRAM(s) Oral every 2 hours PRN CIWA-Ar score increase by 2 points and a total score of 7 or less  LORazepam     Tablet 2 milliGRAM(s) Oral every 1 hour PRN CIWA-Ar score 8 or greater  melatonin 3 milliGRAM(s) Oral at bedtime PRN Insomnia  ondansetron Injectable 4 milliGRAM(s) IV Push every 8 hours PRN Nausea and/or Vomiting    Allergies    No Known Allergies    Intolerances    SOCIAL HISTORY: Homeless, + ETOH last drink .     FAMILY HISTORY:  No pertinent family history in first degree relatives    PHYSICAL EXAM:  Vital Signs Last 24 Hrs  T(C): 37.2 (2023 06:41), Max: 37.6 (2023 22:00)  T(F): 98.9 (2023 06:41), Max: 99.7 (2023 22:00)  HR: 90 (2023 06:41) (90 - 102)  BP: 111/67 (2023 06:41) (111/67 - 132/72)  BP(mean): --  RR: 18 (2023 06:41) (17 - 18)  SpO2: 98% (2023 06:41) (97% - 100%)    Parameters below as of 2023 06:41  Patient On (Oxygen Delivery Method): room air    Wt: 196.8 lbs (2013)    Constitutional: NAD/watching TV/A0X4. Patient mostly [place] speaking, able to speak to patient in Native language.   Got a haircut since last seen,  long fingernails   (+) low airloss support surface  HEENT: NC/AT, non icteric, mucosa moist, throat clear, trachea midline, neck supple  Cardiovascular: Rate regular to tachy   Respiratory: NAD, RA.   Gastrointestinal soft NT/ND  Neurology : able to follow commands   Musculoskeletal: aROM, no deformities/ contractures.   Vascular: BLE equally warm, no cyanosis, clubbing, RLE with edema> Left.   DP/PT pulses +2 palpable  no overt ischemia noted  Bilateral feet with poor hygiene.   hemosiderin staining  RLE chronic trauma wound (Of note, patient known to wound care surgery  from previous admission in 22, reviewed records and patient comes up with different last name previously Francois Clifford same )- Within scar tissue open ulcer measures 2cmx1.5cmx0.2cm. Prev [id number]. Tissue type exposing pink granular fibrinous tissue removed with wound cleansing. Periwound skin with hemosiderin staining. Trace edema around anterior lower leg. No increased warmth, no induration. + Mild Tenderness. No crepitus. Betadine applied and Kerlix/Ace wrap placed.   Reported inconsistent last name for patient to NM.   Skin:  moist w/ good turgor  Psych: calm and cooperative     LABS/ CULTURES/ RADIOLOGY:                        8.5    3.36  )-----------( 88       ( 2023 05:38 )             29.0       135  |  102  |  7   ----------------------------<  92      [23 @ 05:38]  3.6   |  20  |  0.40        Ca     8.9     [23 @ 05:38]      Mg     1.70     [23 @ 05:38]      Phos  3.0     [23 @ 05:38]    TPro  8.0  /  Alb  3.1  /  TBili  1.1  /  DBili  x   /  AST  120  /  ALT  39  /  AlkPhos  180  [23 @ 05:38]    PT/INR: PT 14.9 , INR 1.28       [23 @ 06:20]  PTT: 35.6       [23 @ 06:20]    Culture - Blood (collected 02-15-23 @ 07:44)  Source: .Blood Blood-Peripheral  Preliminary Report (23 @ 13:01):    No growth to date.    Culture - Blood (collected 02-15-23 @ 07:40)  Source: .Blood Blood-Peripheral  Preliminary Report (23 @ 13:01):    No growth to date.    < from: US Duplex Venous Lower Ext Complete, Bilateral (02.15.23 @ 09:37) >    ACC: [id number] EXAM:  US DPLX LWR EXT VEINS COMPL BI   ORDERED BY: BONNIE GONZALEZ     PROCEDURE DATE:  02/15/2023      INTERPRETATION:  CLINICAL INFORMATION: Bilateral lower extremity edema.    COMPARISON: Lower extremity ultrasound 2022    TECHNIQUE: Duplex sonography of the BILATERAL LOWER extremity veins with   color and spectral Doppler, with and without compression.    FINDINGS:    RIGHT:  Normal compressibility of the RIGHT common femoral, femoral and popliteal   veins.  Doppler examination shows normal spontaneous and phasic flow.  No RIGHT calf vein thrombosis is detected.    LEFT:  Normal compressibility of the LEFT common femoral, femoral and popliteal   veins.  Doppler examination shows normal spontaneous and phasic flow.  No LEFT calf vein thrombosis is detected.    IMPRESSION:  No evidence of deep venous thrombosis in either lower extremity.    --- End of Report ---      < end of copied text >      ACC: [id number] EXAM:  CT ANGIO LWR EXT (W)AW IC BI   ORDERED BY: ROLAN LIEBERMAN     PROCEDURE DATE:  02/15/2023      INTERPRETATION:  CLINICAL INFORMATION: Right lower extremity cellulitis.   Evaluate for abscess.    COMPARISON: Right lower extremity CT 2022    CONTRAST/COMPLICATIONS:  IV Contrast: Omnipaque 350  94 cc administered   6 cc discarded  Oral Contrast: NONE  Complications: None reported at time of study completion    CT ANGIOGRAM ABDOMEN, PELVIS, AND LOWER EXTREMITIES:    PROCEDURE:  CT angiography was performed through the abdomen, pelvis, and lower   extremities down to the toes.  Delayed images through the calves were   also obtained. Sagittal and coronal reformats as well as 3D   reconstructions were performed.    FINDINGS:    CENTRAL ARTERIAL SYSTEM:    Abdominal aorta is normal in caliber and widely patent. Celiac axis,   superior mesenteric artery, renal arteries, and inferior mesenteric   artery are patent. Accessory right renal artery. The bilateral common   iliac, external iliac, and internal iliac arteries are widely patent.    RIGHT LOWER EXTREMITY:    The common femoral, deep femoral, superficial femoral, and popliteal   arteries are widely patent. Evaluation of the calf vessels is somewhat   limited by venous contamination, but there appears to be patent   three-vessel runoff to the foot.    LEFT LOWER EXTREMITY:    The common femoral, deep femoral, superficial femoral, and popliteal   arteries are widely patent. The popliteal artery is patent with   three-vessel runoff to the level. Evaluation of the calf vessels is   somewhat limited by venous contamination, but there appears to be patent   three-vessel runoff to the foot.    ADDITIONAL FINDINGS: Fat-containing supraumbilical and umbilical hernias.   Right greater than left bilateral lower extremity soft tissue swelling.   No rim-enhancing fluid collection.    IMPRESSION:  No arterial occlusion or significant stenosis is identified.    Right greater than left bilateral lower extremity soft tissue swelling.   No rim-enhancing fluid collection.    --- End of Report ---

## 2023-02-17 NOTE — DISCHARGE NOTE PROVIDER - NSDCMRMEDTOKEN_GEN_ALL_CORE_FT
doxycycline hyclate 100 mg oral capsule: 1 cap(s) orally 2 times a day thru 12/26 AM dose  ferrous sulfate 325 mg (65 mg elemental iron) oral tablet: 1 tab(s) orally 3 times a week every Mon/Wed/Fri  folic acid 1 mg oral tablet: 1 tab(s) orally once a day  Multiple Vitamins oral tablet: 1 tab(s) orally once a day  pantoprazole 40 mg oral delayed release tablet: 1 tab(s) orally once a day (before a meal)  thiamine 100 mg oral tablet: 1 tab(s) orally once a day   ferrous sulfate 325 mg (65 mg elemental iron) oral tablet: 1 tab(s) orally 3 times a week every Mon/Wed/Fri  folic acid 1 mg oral tablet: 1 tab(s) orally once a day  Multiple Vitamins oral tablet: 1 tab(s) orally once a day  pantoprazole 40 mg oral delayed release tablet: 1 tab(s) orally once a day (before a meal)  thiamine 100 mg oral tablet: 1 tab(s) orally once a day

## 2023-02-17 NOTE — PROGRESS NOTE ADULT - PROBLEM SELECTOR PLAN 7
FENP: No IVF, Lytes PRN, Regular diet, lovenox     #undomiciled   - patient ready for discharge. Needs shelter placement per SW    ****Of note: (patient identified as Priscila Horton in hospital admission in May 2022,  02-Mar-2022) previous MR number [id number]). There may be a concern pt goes to hospital to hospital for shelter

## 2023-02-17 NOTE — DISCHARGE NOTE PROVIDER - NSDCFUADDINST_GEN_ALL_CORE_FT
Topical Recommendations: Clean wound and periwound skin with NS. Pat dry. Apply Betadine wipe to open ulcer, pat dry. Apply Sween 24 moisturizer to bilateral legs, avoid between toes or over open ulcer).Cover with ABD pad and Kerlix. Compress with ACE wraps. Change daily.    Topical Recommendations: Clean wound and periwound skin with NS. Pat dry. Apply Betadine wipe to open ulcer, pat dry. Apply Sween 24 moisturizer to bilateral legs, avoid between toes or over open ulcer).Cover with ABD pad and Kerlix. Compress with ACE wraps. Change daily.   Please follow up with PMD to repeat Chest CT to evaluate nodule in left lower lobe.

## 2023-02-17 NOTE — DISCHARGE NOTE PROVIDER - NSDCCPTREATMENT_GEN_ALL_CORE_FT
PRINCIPAL PROCEDURE  Procedure: CTA of lower extremity without then with contrast  Findings and Treatment: FINDINGS:  CENTRAL ARTERIAL SYSTEM:  Abdominal aorta is normal in caliber and widely patent. Celiac axis,   superior mesenteric artery, renal arteries, and inferior mesenteric   artery are patent. Accessory right renal artery. The bilateral common   iliac, external iliac, and internal iliac arteries are widely patent.  RIGHT LOWER EXTREMITY:  The common femoral, deep femoral, superficial femoral, and popliteal   arteries are widely patent. Evaluation of the calf vessels is somewhat   limited by venous contamination, but there appears to be patent   three-vessel runoff to the foot.  LEFT LOWER EXTREMITY:  The common femoral, deep femoral, superficial femoral, and popliteal   arteries are widely patent. The popliteal artery is patent with   three-vessel runoff to the level. Evaluation of the calf vessels is   somewhat limited by venous contamination, but there appears to be patent   three-vessel runoff to the foot.  ADDITIONAL FINDINGS: Fat-containing supraumbilical and umbilical hernias.   Right greater than left bilateral lower extremity soft tissue swelling.   No rim-enhancing fluid collection.  IMPRESSION:  No arterial occlusion or significant stenosis is identified.  Right greater than left bilateral lower extremity soft tissue swelling.   No rim-enhancing fluid collection.

## 2023-02-18 LAB
ALBUMIN SERPL ELPH-MCNC: 3.2 G/DL — LOW (ref 3.3–5)
ALP SERPL-CCNC: 185 U/L — HIGH (ref 40–120)
ALT FLD-CCNC: 51 U/L — HIGH (ref 4–41)
ANION GAP SERPL CALC-SCNC: 12 MMOL/L — SIGNIFICANT CHANGE UP (ref 7–14)
AST SERPL-CCNC: 133 U/L — HIGH (ref 4–40)
BILIRUB SERPL-MCNC: 1 MG/DL — SIGNIFICANT CHANGE UP (ref 0.2–1.2)
BUN SERPL-MCNC: 9 MG/DL — SIGNIFICANT CHANGE UP (ref 7–23)
CALCIUM SERPL-MCNC: 8.8 MG/DL — SIGNIFICANT CHANGE UP (ref 8.4–10.5)
CHLORIDE SERPL-SCNC: 99 MMOL/L — SIGNIFICANT CHANGE UP (ref 98–107)
CO2 SERPL-SCNC: 20 MMOL/L — LOW (ref 22–31)
CREAT SERPL-MCNC: 0.45 MG/DL — LOW (ref 0.5–1.3)
EGFR: 144 ML/MIN/1.73M2 — SIGNIFICANT CHANGE UP
GLUCOSE SERPL-MCNC: 89 MG/DL — SIGNIFICANT CHANGE UP (ref 70–99)
HCT VFR BLD CALC: 30.8 % — LOW (ref 39–50)
HGB BLD-MCNC: 9.1 G/DL — LOW (ref 13–17)
MAGNESIUM SERPL-MCNC: 1.6 MG/DL — SIGNIFICANT CHANGE UP (ref 1.6–2.6)
MCHC RBC-ENTMCNC: 23.2 PG — LOW (ref 27–34)
MCHC RBC-ENTMCNC: 29.5 GM/DL — LOW (ref 32–36)
MCV RBC AUTO: 78.6 FL — LOW (ref 80–100)
NRBC # BLD: 0 /100 WBCS — SIGNIFICANT CHANGE UP (ref 0–0)
NRBC # FLD: 0 K/UL — SIGNIFICANT CHANGE UP (ref 0–0)
PHOSPHATE SERPL-MCNC: 4.9 MG/DL — HIGH (ref 2.5–4.5)
PLATELET # BLD AUTO: 116 K/UL — LOW (ref 150–400)
POTASSIUM SERPL-MCNC: 3.4 MMOL/L — LOW (ref 3.5–5.3)
POTASSIUM SERPL-SCNC: 3.4 MMOL/L — LOW (ref 3.5–5.3)
PROT SERPL-MCNC: 8.5 G/DL — HIGH (ref 6–8.3)
RBC # BLD: 3.92 M/UL — LOW (ref 4.2–5.8)
RBC # FLD: 22 % — HIGH (ref 10.3–14.5)
SODIUM SERPL-SCNC: 131 MMOL/L — LOW (ref 135–145)
WBC # BLD: 4.09 K/UL — SIGNIFICANT CHANGE UP (ref 3.8–10.5)
WBC # FLD AUTO: 4.09 K/UL — SIGNIFICANT CHANGE UP (ref 3.8–10.5)

## 2023-02-18 PROCEDURE — 99232 SBSQ HOSP IP/OBS MODERATE 35: CPT

## 2023-02-18 RX ORDER — POTASSIUM CHLORIDE 20 MEQ
40 PACKET (EA) ORAL ONCE
Refills: 0 | Status: COMPLETED | OUTPATIENT
Start: 2023-02-18 | End: 2023-02-18

## 2023-02-18 RX ADMIN — Medication 100 MILLIGRAM(S): at 05:15

## 2023-02-18 RX ADMIN — Medication 105 MILLIGRAM(S): at 06:05

## 2023-02-18 RX ADMIN — Medication 650 MILLIGRAM(S): at 23:58

## 2023-02-18 RX ADMIN — ENOXAPARIN SODIUM 40 MILLIGRAM(S): 100 INJECTION SUBCUTANEOUS at 17:54

## 2023-02-18 RX ADMIN — Medication 1 MILLIGRAM(S): at 11:37

## 2023-02-18 RX ADMIN — Medication 1 TABLET(S): at 11:38

## 2023-02-18 RX ADMIN — Medication 100 MILLIGRAM(S): at 17:54

## 2023-02-18 RX ADMIN — Medication 40 MILLIEQUIVALENT(S): at 11:37

## 2023-02-18 NOTE — PROGRESS NOTE ADULT - SUBJECTIVE AND OBJECTIVE BOX
Patient is a 32y old  Male who presents with a chief complaint of RLE swelling and redness (17 Feb 2023 16:41)      SUBJECTIVE / OVERNIGHT EVENTS: No acute events overnight. Denies any acute complaints. RLE pain improved.     MEDICATIONS  (STANDING):  doxycycline monohydrate Capsule 100 milliGRAM(s) Oral every 12 hours  enoxaparin Injectable 40 milliGRAM(s) SubCutaneous every 24 hours  folic acid 1 milliGRAM(s) Oral daily  influenza   Vaccine 0.5 milliLiter(s) IntraMuscular once  lactated ringers. 1000 milliLiter(s) (125 mL/Hr) IV Continuous <Continuous>  multivitamin 1 Tablet(s) Oral daily  thiamine IVPB 500 milliGRAM(s) IV Intermittent every 24 hours    MEDICATIONS  (PRN):  acetaminophen     Tablet .. 650 milliGRAM(s) Oral every 6 hours PRN Temp greater or equal to 38C (100.4F), Mild Pain (1 - 3)  aluminum hydroxide/magnesium hydroxide/simethicone Suspension 30 milliLiter(s) Oral every 4 hours PRN Dyspepsia  LORazepam     Tablet 2 milliGRAM(s) Oral every 2 hours PRN CIWA-Ar score increase by 2 points and a total score of 7 or less  LORazepam     Tablet 2 milliGRAM(s) Oral every 1 hour PRN CIWA-Ar score 8 or greater  melatonin 3 milliGRAM(s) Oral at bedtime PRN Insomnia  ondansetron Injectable 4 milliGRAM(s) IV Push every 8 hours PRN Nausea and/or Vomiting      T(C): 36.8 (02-18-23 @ 09:43), Max: 37.6 (02-18-23 @ 02:08)  HR: 98 (02-18-23 @ 09:43) (80 - 98)  BP: 122/68 (02-18-23 @ 09:43) (114/68 - 122/80)  RR: 18 (02-18-23 @ 09:43) (18 - 20)  SpO2: 99% (02-18-23 @ 09:43) (96% - 99%)  CAPILLARY BLOOD GLUCOSE        I&O's Summary    17 Feb 2023 07:01  -  18 Feb 2023 07:00  --------------------------------------------------------  IN: 0 mL / OUT: 1080 mL / NET: -1080 mL        PHYSICAL EXAM:  GENERAL: no apparent distress, on room air  EYES: EOMI, PERRLA, conjunctiva and sclera clear b/l  CHEST/LUNG: Clear to auscultation bilaterally; No wheezing or crackles  HEART: s1/s2, RRR, no murmurs appreciated  ABDOMEN: Soft, Nontender, Nondistended; Bowel sounds present  EXTREMITIES:  2+ Peripheral Pulses, RLE wrapped in ACE bandage   MSK: no joint effusions  Back: no spinal tenderness  NEUROLOGY: awake, alert, responds to Qs appropriately, no sensory or motor deficits, 5/5 muscle strength equal in all extremities, CN 2-12 grossly intact  PSYCH: calm, cooperative  SKIN: No rashes or lesions    LABS:                        9.1    4.09  )-----------( 116      ( 18 Feb 2023 06:00 )             30.8     02-18    131<L>  |  99  |  9   ----------------------------<  89  3.4<L>   |  20<L>  |  0.45<L>    Ca    8.8      18 Feb 2023 06:00  Phos  4.9     02-18  Mg     1.60     02-18    TPro  8.5<H>  /  Alb  3.2<L>  /  TBili  1.0  /  DBili  x   /  AST  133<H>  /  ALT  51<H>  /  AlkPhos  185<H>  02-18              RADIOLOGY & ADDITIONAL TESTS:

## 2023-02-18 NOTE — PROGRESS NOTE ADULT - PROBLEM SELECTOR PLAN 7
FENP: No IVF, Lytes PRN, Regular diet, lovenox     #undomiciled   - patient ready for discharge. Needs shelter placement per , will complete medical portion     ****Of note: (patient identified as Priscila Horton in hospital admission in May 2022,  02-Mar-2022) previous MR number [id number]). There may be a concern pt goes to hospital to hospital for shelter

## 2023-02-18 NOTE — PROGRESS NOTE ADULT - PROBLEM SELECTOR PLAN 5
last drink on 2/14   drinks 12-18 beers per day   CIWA with IV ativan   if requires doses, consider standing regimen

## 2023-02-18 NOTE — PROGRESS NOTE ADULT - PROBLEM SELECTOR PLAN 1
Patient has neutropenia, tachycardia on presentation with RLE wound. This is likely from ETOH but in the right clinical setting could be sepsis.   - CT R LE angio on 2/15L: scan without abscess, no arterial occlusion or significant stenosis is identified. Right greater than left bilateral lower extremity soft tissue swelling. No rim-enhancing fluid collection.  - LE duplex negative for clots   - blood culture NGTD   - wound care recs appreciated, unlikely cellulitis  - will finish antibiotic course with doxycycline (end date 2/21)

## 2023-02-18 NOTE — PROGRESS NOTE ADULT - PROBLEM SELECTOR PLAN 2
These hematological changes were attributed to ETOH during last admission  - LDH mildly elevated, haptoglobin normal, Iron studies c/w QUITA, Folate/B12 both WNL,   - reticulocyte index hypoproliferative (2/2 severe malnutrition and QUITA)   - Does not meet 4Ts for HIT  - HIV negative   - now stable to improving   - restarted DVT ppx on 2/17

## 2023-02-19 LAB
ANION GAP SERPL CALC-SCNC: 10 MMOL/L — SIGNIFICANT CHANGE UP (ref 7–14)
BUN SERPL-MCNC: 10 MG/DL — SIGNIFICANT CHANGE UP (ref 7–23)
CALCIUM SERPL-MCNC: 8.9 MG/DL — SIGNIFICANT CHANGE UP (ref 8.4–10.5)
CHLORIDE SERPL-SCNC: 102 MMOL/L — SIGNIFICANT CHANGE UP (ref 98–107)
CO2 SERPL-SCNC: 20 MMOL/L — LOW (ref 22–31)
CREAT SERPL-MCNC: 0.43 MG/DL — LOW (ref 0.5–1.3)
EGFR: 146 ML/MIN/1.73M2 — SIGNIFICANT CHANGE UP
GLUCOSE SERPL-MCNC: 88 MG/DL — SIGNIFICANT CHANGE UP (ref 70–99)
HCT VFR BLD CALC: 31.5 % — LOW (ref 39–50)
HGB BLD-MCNC: 9.2 G/DL — LOW (ref 13–17)
MAGNESIUM SERPL-MCNC: 1.6 MG/DL — SIGNIFICANT CHANGE UP (ref 1.6–2.6)
MCHC RBC-ENTMCNC: 23.4 PG — LOW (ref 27–34)
MCHC RBC-ENTMCNC: 29.2 GM/DL — LOW (ref 32–36)
MCV RBC AUTO: 80.2 FL — SIGNIFICANT CHANGE UP (ref 80–100)
NRBC # BLD: 0 /100 WBCS — SIGNIFICANT CHANGE UP (ref 0–0)
NRBC # FLD: 0 K/UL — SIGNIFICANT CHANGE UP (ref 0–0)
PHOSPHATE SERPL-MCNC: 4.2 MG/DL — SIGNIFICANT CHANGE UP (ref 2.5–4.5)
PLATELET # BLD AUTO: 132 K/UL — LOW (ref 150–400)
POTASSIUM SERPL-MCNC: 3.5 MMOL/L — SIGNIFICANT CHANGE UP (ref 3.5–5.3)
POTASSIUM SERPL-SCNC: 3.5 MMOL/L — SIGNIFICANT CHANGE UP (ref 3.5–5.3)
RBC # BLD: 3.93 M/UL — LOW (ref 4.2–5.8)
RBC # FLD: 22.5 % — HIGH (ref 10.3–14.5)
SODIUM SERPL-SCNC: 132 MMOL/L — LOW (ref 135–145)
WBC # BLD: 3.97 K/UL — SIGNIFICANT CHANGE UP (ref 3.8–10.5)
WBC # FLD AUTO: 3.97 K/UL — SIGNIFICANT CHANGE UP (ref 3.8–10.5)

## 2023-02-19 PROCEDURE — 99232 SBSQ HOSP IP/OBS MODERATE 35: CPT

## 2023-02-19 RX ADMIN — Medication 100 MILLIGRAM(S): at 18:28

## 2023-02-19 RX ADMIN — Medication 1 TABLET(S): at 13:19

## 2023-02-19 RX ADMIN — Medication 1 MILLIGRAM(S): at 13:19

## 2023-02-19 RX ADMIN — Medication 650 MILLIGRAM(S): at 00:40

## 2023-02-19 RX ADMIN — ENOXAPARIN SODIUM 40 MILLIGRAM(S): 100 INJECTION SUBCUTANEOUS at 18:28

## 2023-02-19 RX ADMIN — Medication 105 MILLIGRAM(S): at 06:13

## 2023-02-19 RX ADMIN — Medication 100 MILLIGRAM(S): at 05:16

## 2023-02-19 NOTE — PROGRESS NOTE ADULT - SUBJECTIVE AND OBJECTIVE BOX
Patient is a 32y old  Male who presents with a chief complaint of RLE swelling and redness (18 Feb 2023 16:38)      SUBJECTIVE / OVERNIGHT EVENTS: No acute events overnight. No complaints.     MEDICATIONS  (STANDING):  doxycycline monohydrate Capsule 100 milliGRAM(s) Oral every 12 hours  enoxaparin Injectable 40 milliGRAM(s) SubCutaneous every 24 hours  folic acid 1 milliGRAM(s) Oral daily  influenza   Vaccine 0.5 milliLiter(s) IntraMuscular once  lactated ringers. 1000 milliLiter(s) (125 mL/Hr) IV Continuous <Continuous>  multivitamin 1 Tablet(s) Oral daily  thiamine IVPB 500 milliGRAM(s) IV Intermittent every 24 hours    MEDICATIONS  (PRN):  acetaminophen     Tablet .. 650 milliGRAM(s) Oral every 6 hours PRN Temp greater or equal to 38C (100.4F), Mild Pain (1 - 3)  aluminum hydroxide/magnesium hydroxide/simethicone Suspension 30 milliLiter(s) Oral every 4 hours PRN Dyspepsia  LORazepam     Tablet 2 milliGRAM(s) Oral every 2 hours PRN CIWA-Ar score increase by 2 points and a total score of 7 or less  LORazepam     Tablet 2 milliGRAM(s) Oral every 1 hour PRN CIWA-Ar score 8 or greater  melatonin 3 milliGRAM(s) Oral at bedtime PRN Insomnia  ondansetron Injectable 4 milliGRAM(s) IV Push every 8 hours PRN Nausea and/or Vomiting      T(C): 36.9 (02-19-23 @ 09:03), Max: 37.6 (02-19-23 @ 02:38)  HR: 89 (02-19-23 @ 09:42) (80 - 89)  BP: 125/77 (02-19-23 @ 09:42) (119/73 - 125/77)  RR: 18 (02-19-23 @ 09:03) (18 - 18)  SpO2: 96% (02-19-23 @ 09:03) (96% - 98%)  CAPILLARY BLOOD GLUCOSE        I&O's Summary    18 Feb 2023 07:01  -  19 Feb 2023 07:00  --------------------------------------------------------  IN: 0 mL / OUT: 600 mL / NET: -600 mL        PHYSICAL EXAM:  GENERAL: no apparent distress, on room air  EYES: EOMI, PERRLA, conjunctiva and sclera clear b/l  CHEST/LUNG: Clear to auscultation bilaterally; No wheezing or crackles  HEART: s1/s2, RRR, no murmurs appreciated  ABDOMEN: Soft, Nontender, Nondistended; Bowel sounds present  EXTREMITIES:  2+ Peripheral Pulses, RLE wrapped in ACE bandage   MSK: no joint effusions  Back: no spinal tenderness  NEUROLOGY: awake, alert, responds to Qs appropriately, no sensory or motor deficits, 5/5 muscle strength equal in all extremities, CN 2-12 grossly intact  PSYCH: calm, cooperative  SKIN: No rashes or lesions    LABS:                        9.2    3.97  )-----------( 132      ( 19 Feb 2023 05:20 )             31.5     02-19    132<L>  |  102  |  10  ----------------------------<  88  3.5   |  20<L>  |  0.43<L>    Ca    8.9      19 Feb 2023 05:20  Phos  4.2     02-19  Mg     1.60     02-19    TPro  8.5<H>  /  Alb  3.2<L>  /  TBili  1.0  /  DBili  x   /  AST  133<H>  /  ALT  51<H>  /  AlkPhos  185<H>  02-18              RADIOLOGY & ADDITIONAL TESTS:

## 2023-02-19 NOTE — PROGRESS NOTE ADULT - PROBLEM SELECTOR PLAN 7
FENP: No IVF, Lytes PRN, Regular diet, lovenox     #undomiciled   - patient ready for discharge. Needs shelter placement per , will complete medical portion     ****Of note: (patient identified as Priscila Horton in hospital admission in May 2022,  02-Mar-2022) previous MR number [id number]). There may be a concern pt goes to hospital to hospital for shelter

## 2023-02-20 LAB
ANION GAP SERPL CALC-SCNC: 12 MMOL/L — SIGNIFICANT CHANGE UP (ref 7–14)
BUN SERPL-MCNC: 11 MG/DL — SIGNIFICANT CHANGE UP (ref 7–23)
CALCIUM SERPL-MCNC: 8.9 MG/DL — SIGNIFICANT CHANGE UP (ref 8.4–10.5)
CHLORIDE SERPL-SCNC: 102 MMOL/L — SIGNIFICANT CHANGE UP (ref 98–107)
CO2 SERPL-SCNC: 18 MMOL/L — LOW (ref 22–31)
CREAT SERPL-MCNC: 0.47 MG/DL — LOW (ref 0.5–1.3)
CULTURE RESULTS: SIGNIFICANT CHANGE UP
CULTURE RESULTS: SIGNIFICANT CHANGE UP
EGFR: 142 ML/MIN/1.73M2 — SIGNIFICANT CHANGE UP
GLUCOSE SERPL-MCNC: 91 MG/DL — SIGNIFICANT CHANGE UP (ref 70–99)
HCT VFR BLD CALC: 31.5 % — LOW (ref 39–50)
HGB BLD-MCNC: 9.3 G/DL — LOW (ref 13–17)
MAGNESIUM SERPL-MCNC: 1.6 MG/DL — SIGNIFICANT CHANGE UP (ref 1.6–2.6)
MCHC RBC-ENTMCNC: 23.5 PG — LOW (ref 27–34)
MCHC RBC-ENTMCNC: 29.5 GM/DL — LOW (ref 32–36)
MCV RBC AUTO: 79.7 FL — LOW (ref 80–100)
NRBC # BLD: 0 /100 WBCS — SIGNIFICANT CHANGE UP (ref 0–0)
NRBC # FLD: 0 K/UL — SIGNIFICANT CHANGE UP (ref 0–0)
PHOSPHATE SERPL-MCNC: 4.1 MG/DL — SIGNIFICANT CHANGE UP (ref 2.5–4.5)
PLATELET # BLD AUTO: 137 K/UL — LOW (ref 150–400)
POTASSIUM SERPL-MCNC: 3.4 MMOL/L — LOW (ref 3.5–5.3)
POTASSIUM SERPL-SCNC: 3.4 MMOL/L — LOW (ref 3.5–5.3)
RBC # BLD: 3.95 M/UL — LOW (ref 4.2–5.8)
RBC # FLD: 22.5 % — HIGH (ref 10.3–14.5)
SODIUM SERPL-SCNC: 132 MMOL/L — LOW (ref 135–145)
SPECIMEN SOURCE: SIGNIFICANT CHANGE UP
SPECIMEN SOURCE: SIGNIFICANT CHANGE UP
WBC # BLD: 3.99 K/UL — SIGNIFICANT CHANGE UP (ref 3.8–10.5)
WBC # FLD AUTO: 3.99 K/UL — SIGNIFICANT CHANGE UP (ref 3.8–10.5)

## 2023-02-20 PROCEDURE — 99232 SBSQ HOSP IP/OBS MODERATE 35: CPT

## 2023-02-20 RX ORDER — POTASSIUM CHLORIDE 20 MEQ
40 PACKET (EA) ORAL ONCE
Refills: 0 | Status: COMPLETED | OUTPATIENT
Start: 2023-02-20 | End: 2023-02-20

## 2023-02-20 RX ADMIN — Medication 105 MILLIGRAM(S): at 05:07

## 2023-02-20 RX ADMIN — Medication 1 MILLIGRAM(S): at 13:16

## 2023-02-20 RX ADMIN — ENOXAPARIN SODIUM 40 MILLIGRAM(S): 100 INJECTION SUBCUTANEOUS at 18:05

## 2023-02-20 RX ADMIN — Medication 100 MILLIGRAM(S): at 05:07

## 2023-02-20 RX ADMIN — Medication 1 TABLET(S): at 13:17

## 2023-02-20 RX ADMIN — Medication 40 MILLIEQUIVALENT(S): at 13:36

## 2023-02-20 RX ADMIN — Medication 100 MILLIGRAM(S): at 18:05

## 2023-02-20 NOTE — PROGRESS NOTE ADULT - SUBJECTIVE AND OBJECTIVE BOX
[facility]  Dr. Silvana Gracia  Pager [phone]    Patient is a 32y old  Male who presents with a chief complaint of RLE swelling and redness (18 Feb 2023 16:38)      SUBJECTIVE / OVERNIGHT EVENTS: No acute events overnight. No tremors. Feels well.     MEDICATIONS  (STANDING):  doxycycline monohydrate Capsule 100 milliGRAM(s) Oral every 12 hours  enoxaparin Injectable 40 milliGRAM(s) SubCutaneous every 24 hours  folic acid 1 milliGRAM(s) Oral daily  influenza   Vaccine 0.5 milliLiter(s) IntraMuscular once  lactated ringers. 1000 milliLiter(s) (125 mL/Hr) IV Continuous <Continuous>  multivitamin 1 Tablet(s) Oral daily  thiamine IVPB 500 milliGRAM(s) IV Intermittent every 24 hours    MEDICATIONS  (PRN):  acetaminophen     Tablet .. 650 milliGRAM(s) Oral every 6 hours PRN Temp greater or equal to 38C (100.4F), Mild Pain (1 - 3)  aluminum hydroxide/magnesium hydroxide/simethicone Suspension 30 milliLiter(s) Oral every 4 hours PRN Dyspepsia  LORazepam     Tablet 2 milliGRAM(s) Oral every 2 hours PRN CIWA-Ar score increase by 2 points and a total score of 7 or less  LORazepam     Tablet 2 milliGRAM(s) Oral every 1 hour PRN CIWA-Ar score 8 or greater  melatonin 3 milliGRAM(s) Oral at bedtime PRN Insomnia  ondansetron Injectable 4 milliGRAM(s) IV Push every 8 hours PRN Nausea and/or Vomiting    Vital Signs Last 24 Hrs  T(C): 37.1 (20 Feb 2023 10:44), Max: 37.3 (19 Feb 2023 18:25)  T(F): 98.8 (20 Feb 2023 10:44), Max: 99.1 (19 Feb 2023 18:25)  HR: 74 (20 Feb 2023 10:44) (74 - 87)  BP: 107/65 (20 Feb 2023 10:44) (107/65 - 123/63)  BP(mean): --  RR: 18 (20 Feb 2023 10:44) (18 - 18)  SpO2: 99% (20 Feb 2023 10:44) (99% - 100%)    Parameters below as of 20 Feb 2023 01:23  Patient On (Oxygen Delivery Method): room air      PHYSICAL EXAM:  GENERAL: no apparent distress, on room air  EYES: EOMI, PERRLA, conjunctiva and sclera clear b/l  CHEST/LUNG: Clear to auscultation bilaterally; No wheezing or crackles  HEART: s1/s2, RRR, no murmurs appreciated  ABDOMEN: Soft, Nontender, Nondistended; Bowel sounds present  EXTREMITIES:  2+ Peripheral Pulses, RLE wrapped in ACE bandage   MSK: no joint effusions  Back: no spinal tenderness  NEUROLOGY: awake, alert, responds to Qs appropriately, no sensory or motor deficits, 5/5 muscle strength equal in all extremities, CN 2-12 grossly intact  PSYCH: calm, cooperative  SKIN: No rashes or lesions    LABS:                                   9.3    3.99  )-----------( 137      ( 20 Feb 2023 06:11 )             31.5   02-20    132<L>  |  102  |  11  ----------------------------<  91  3.4<L>   |  18<L>  |  0.47<L>    Ca    8.9      20 Feb 2023 06:11  Phos  4.1     02-20  Mg     1.60     02-20      RADIOLOGY & ADDITIONAL TESTS:

## 2023-02-20 NOTE — PROGRESS NOTE ADULT - PROBLEM SELECTOR PLAN 7
FENP: No IVF, Lytes PRN, Regular diet, lovenox     #undomiciled   - patient ready for discharge. Needs shelter placement per , will complete medical portion     ****Of note: (patient identified as Priscila Horton in hospital admission in May 2022,  02-Mar-2022) previous MR number [id number]). There may be a concern pt goes to hospital to hospital for shelter

## 2023-02-21 PROCEDURE — 99232 SBSQ HOSP IP/OBS MODERATE 35: CPT

## 2023-02-21 RX ORDER — POTASSIUM CHLORIDE 20 MEQ
40 PACKET (EA) ORAL ONCE
Refills: 0 | Status: COMPLETED | OUTPATIENT
Start: 2023-02-21 | End: 2023-02-21

## 2023-02-21 RX ADMIN — ENOXAPARIN SODIUM 40 MILLIGRAM(S): 100 INJECTION SUBCUTANEOUS at 18:57

## 2023-02-21 RX ADMIN — Medication 1 TABLET(S): at 11:28

## 2023-02-21 RX ADMIN — Medication 100 MILLIGRAM(S): at 18:57

## 2023-02-21 RX ADMIN — Medication 105 MILLIGRAM(S): at 05:48

## 2023-02-21 RX ADMIN — Medication 100 MILLIGRAM(S): at 05:48

## 2023-02-21 RX ADMIN — Medication 40 MILLIEQUIVALENT(S): at 11:28

## 2023-02-21 RX ADMIN — Medication 1 MILLIGRAM(S): at 11:28

## 2023-02-21 NOTE — PROGRESS NOTE ADULT - PROBLEM SELECTOR PLAN 7
FENP: No IVF, Lytes PRN, Regular diet, lovenox     #undomiciled   - patient ready for discharge. Needs shelter placement per , will complete medical portion     ****Of note: (patient identified as Priscila Horton in hospital admission in May 2022,  02-Mar-2022) previous MR number [id number]). There may be a concern pt goes to hospital to hospital for shelter DVT ppx: Lovenox     Patient hemodynamically stable for discharge. Needs shelter placement per SW-> still pending.     ****Of note: (patient identified as Priscila Horton in hospital admission in May 2022,  02-Mar-2022) previous MR number [id number]). There may be a concern pt goes to hospital to hospital for shelter

## 2023-02-21 NOTE — PROGRESS NOTE ADULT - PROBLEM SELECTOR PLAN 4
Distended abdomin. Non tender. Ab US from last visit showed hepatic steatosis  -abd US continues to show fatty liver. No s/s masses or cirrhosis  -OP follow up Distended abdomen/Non tender  Abd U/S last admission showed hepatic steatosis  Abd U/S show fatty liver. No s/s masses or cirrhosis      - Follow up outpt with GI   - Monitor LFTs

## 2023-02-21 NOTE — PROGRESS NOTE ADULT - PROBLEM SELECTOR PLAN 5
last drink on 2/14   drinks 12-18 beers per day   finished ciwa monitoring last drink on 2/14   drinks 12-18 beers per day   finished ciwa monitoring    Currently CIWA 0

## 2023-02-21 NOTE — PROGRESS NOTE ADULT - SUBJECTIVE AND OBJECTIVE BOX
Patient is a 32y old  Male who presents with a chief complaint of RLE swelling and redness (20 Feb 2023 12:15)      SUBJECTIVE / OVERNIGHT EVENTS:    No events overnight. This AM, patient without n/v/d/cp/sob.      MEDICATIONS  (STANDING):  doxycycline monohydrate Capsule 100 milliGRAM(s) Oral every 12 hours  enoxaparin Injectable 40 milliGRAM(s) SubCutaneous every 24 hours  folic acid 1 milliGRAM(s) Oral daily  influenza   Vaccine 0.5 milliLiter(s) IntraMuscular once  multivitamin 1 Tablet(s) Oral daily  thiamine IVPB 500 milliGRAM(s) IV Intermittent every 24 hours    MEDICATIONS  (PRN):  acetaminophen     Tablet .. 650 milliGRAM(s) Oral every 6 hours PRN Temp greater or equal to 38C (100.4F), Mild Pain (1 - 3)  aluminum hydroxide/magnesium hydroxide/simethicone Suspension 30 milliLiter(s) Oral every 4 hours PRN Dyspepsia  melatonin 3 milliGRAM(s) Oral at bedtime PRN Insomnia  ondansetron Injectable 4 milliGRAM(s) IV Push every 8 hours PRN Nausea and/or Vomiting      PHYSICAL EXAM:  T(C): 36.8 (02-21-23 @ 09:31), Max: 36.9 (02-20-23 @ 17:44)  HR: 81 (02-21-23 @ 09:31) (71 - 86)  BP: 120/62 (02-21-23 @ 09:31) (106/52 - 124/57)  RR: 19 (02-21-23 @ 09:31) (18 - 19)  SpO2: 100% (02-21-23 @ 09:31) (100% - 100%)  I&O's Summary    20 Feb 2023 07:01  -  21 Feb 2023 07:00  --------------------------------------------------------  IN: 830 mL / OUT: 1000 mL / NET: -170 mL      GENERAL: NAD, well-developed  HEAD:  Atraumatic, Normocephalic, MMM  CHEST/LUNG: No use of accessory muscles, CTAB, breathing non-labored  COR: RR, no mrcg  ABD: Soft, ND/NT, +BS  PSYCH: AAOx3  NEUROLOGY: CN II-XII grossly intact, moving all extremities  SKIN: No rashes or lesions  EXT: wwp, no cce    LABS:  CAPILLARY BLOOD GLUCOSE                              9.3    3.99  )-----------( 137      ( 20 Feb 2023 06:11 )             31.5     02-20    132<L>  |  102  |  11  ----------------------------<  91  3.4<L>   |  18<L>  |  0.47<L>    Ca    8.9      20 Feb 2023 06:11  Phos  4.1     02-20  Mg     1.60     02-20                  RADIOLOGY & ADDITIONAL TESTS:    Telemetry Personally Reviewed -     Imaging Personally Reviewed -     Imaging Reviewed -     Consultant(s) Notes Reviewed -       Care Discussed with Consultants/Other Providers -  Patient is a 32y old  Male who presents with a chief complaint of RLE swelling and redness (20 Feb 2023 12:15)      SUBJECTIVE / OVERNIGHT EVENTS:    No events overnight. This AM, patient without n/v/d/cp/sob.  Pt reports feeling well and denies any acute complaints at this time.     MEDICATIONS  (STANDING):  doxycycline monohydrate Capsule 100 milliGRAM(s) Oral every 12 hours  enoxaparin Injectable 40 milliGRAM(s) SubCutaneous every 24 hours  folic acid 1 milliGRAM(s) Oral daily  influenza   Vaccine 0.5 milliLiter(s) IntraMuscular once  multivitamin 1 Tablet(s) Oral daily  thiamine IVPB 500 milliGRAM(s) IV Intermittent every 24 hours    MEDICATIONS  (PRN):  acetaminophen     Tablet .. 650 milliGRAM(s) Oral every 6 hours PRN Temp greater or equal to 38C (100.4F), Mild Pain (1 - 3)  aluminum hydroxide/magnesium hydroxide/simethicone Suspension 30 milliLiter(s) Oral every 4 hours PRN Dyspepsia  melatonin 3 milliGRAM(s) Oral at bedtime PRN Insomnia  ondansetron Injectable 4 milliGRAM(s) IV Push every 8 hours PRN Nausea and/or Vomiting      PHYSICAL EXAM:  T(C): 36.8 (02-21-23 @ 09:31), Max: 36.9 (02-20-23 @ 17:44)  HR: 81 (02-21-23 @ 09:31) (71 - 86)  BP: 120/62 (02-21-23 @ 09:31) (106/52 - 124/57)  RR: 19 (02-21-23 @ 09:31) (18 - 19)  SpO2: 100% (02-21-23 @ 09:31) (100% - 100%)  I&O's Summary    20 Feb 2023 07:01  -  21 Feb 2023 07:00  --------------------------------------------------------  IN: 830 mL / OUT: 1000 mL / NET: -170 mL      GENERAL: NAD, well-developed  HEAD:  Atraumatic, Normocephalic, MMM  CHEST/LUNG: No use of accessory muscles, CTAB, breathing non-labored  COR: RR, no mrcg  ABD: Soft, ND/NT, +BS  PSYCH: AAOx3  NEUROLOGY: CN II-XII grossly intact, moving all extremities  SKIN: RLE wound   EXT: no LE edema noted B/L     LABS:  CAPILLARY BLOOD GLUCOSE                              9.3    3.99  )-----------( 137      ( 20 Feb 2023 06:11 )             31.5     02-20    132<L>  |  102  |  11  ----------------------------<  91  3.4<L>   |  18<L>  |  0.47<L>    Ca    8.9      20 Feb 2023 06:11  Phos  4.1     02-20  Mg     1.60     02-20                  RADIOLOGY & ADDITIONAL TESTS:    Imaging Personally Reviewed -     Imaging Reviewed -     Consultant(s) Notes Reviewed -       Care Discussed with Consultants/Other Providers -

## 2023-02-21 NOTE — PROGRESS NOTE ADULT - PROBLEM SELECTOR PLAN 6
- CT on 2/15: 2.1 cm patchy opacity is noted in the left lower lobe. Etiology is unclear. Follow-up CT scan is recommended in 3 months to ensure complete resolution. CT on 2/15: 2.1 cm patchy opacity is noted in the left lower lobe. Etiology is unclear.     - will need to have Follow-up CT scan is recommended in 3 months to ensure complete resolution.

## 2023-02-21 NOTE — PROGRESS NOTE ADULT - PROBLEM SELECTOR PLAN 1
Patient has neutropenia, tachycardia on presentation with RLE wound. This is likely from ETOH but in the right clinical setting could be sepsis.   - CT R LE angio on 2/15L: scan without abscess, no arterial occlusion or significant stenosis is identified. Right greater than left bilateral lower extremity soft tissue swelling. No rim-enhancing fluid collection.  - LE duplex negative for clots   - blood culture NGTD   - wound care recs appreciated, unlikely cellulitis  - will finish antibiotic course with doxycycline (end date 2/21) Patient has neutropenia, tachycardia on presentation with RLE wound. This is likely from ETOH but in the right clinical setting could be sepsis.   CT R LE angio on 2/15L: scan without abscess, no arterial occlusion or significant stenosis is identified. Right greater than left bilateral lower extremity soft tissue swelling. No rim-enhancing fluid collection.  LE duplex negative for clots   blood culture Neg    - wound care recs appreciated, unlikely cellulitis  - Finished antibiotic course with doxycycline today

## 2023-02-21 NOTE — PROGRESS NOTE ADULT - PROBLEM SELECTOR PLAN 2
These hematological changes were attributed to ETOH during last admission  - LDH mildly elevated, haptoglobin normal, Iron studies c/w QUITA, Folate/B12 both WNL,   - reticulocyte index hypoproliferative (2/2 severe malnutrition and QUITA)   - Does not meet 4Ts for HIT  - HIV negative   - now stable to improving   - restarted DVT ppx on 2/17 These hematological changes were attributed to ETOH during last admission  LDH mildly elevated, haptoglobin normal, Iron studies c/w QUITA, Folate/B12 both WNL,   reticulocyte index hypoproliferative (2/2 severe malnutrition and QUITA)   Does not meet 4Ts for HIT. HIV negative     Now improving     - restarted DVT ppx on 2/17

## 2023-02-22 PROCEDURE — 99231 SBSQ HOSP IP/OBS SF/LOW 25: CPT

## 2023-02-22 RX ORDER — FERROUS SULFATE 325(65) MG
1 TABLET ORAL
Qty: 13 | Refills: 0
Start: 2023-02-22 | End: 2023-03-23

## 2023-02-22 RX ORDER — PANTOPRAZOLE SODIUM 20 MG/1
1 TABLET, DELAYED RELEASE ORAL
Qty: 30 | Refills: 0
Start: 2023-02-22 | End: 2023-03-23

## 2023-02-22 RX ORDER — FOLIC ACID 0.8 MG
1 TABLET ORAL
Qty: 30 | Refills: 0
Start: 2023-02-22 | End: 2023-03-23

## 2023-02-22 RX ORDER — POTASSIUM CHLORIDE 20 MEQ
40 PACKET (EA) ORAL ONCE
Refills: 0 | Status: COMPLETED | OUTPATIENT
Start: 2023-02-22 | End: 2023-02-22

## 2023-02-22 RX ORDER — THIAMINE MONONITRATE (VIT B1) 100 MG
1 TABLET ORAL
Qty: 30 | Refills: 0
Start: 2023-02-22 | End: 2023-03-23

## 2023-02-22 RX ADMIN — Medication 1 MILLIGRAM(S): at 11:27

## 2023-02-22 RX ADMIN — Medication 105 MILLIGRAM(S): at 06:51

## 2023-02-22 RX ADMIN — Medication 40 MILLIEQUIVALENT(S): at 11:27

## 2023-02-22 RX ADMIN — ENOXAPARIN SODIUM 40 MILLIGRAM(S): 100 INJECTION SUBCUTANEOUS at 16:31

## 2023-02-22 RX ADMIN — Medication 1 TABLET(S): at 11:27

## 2023-02-22 RX ADMIN — Medication 100 MILLIGRAM(S): at 06:51

## 2023-02-22 NOTE — PROGRESS NOTE ADULT - PROBLEM SELECTOR PLAN 7
DVT ppx: Lovenox     Patient hemodynamically stable for discharge. Needs shelter placement per SW-> still pending.     ****Of note: (patient identified as Priscila Horton in hospital admission in May 2022,  02-Mar-2022) previous MR number [id number]). There may be a concern pt goes to hospital to hospital for shelter

## 2023-02-22 NOTE — PROGRESS NOTE ADULT - PROBLEM SELECTOR PLAN 1
Patient has neutropenia, tachycardia on presentation with RLE wound. This is likely from ETOH but in the right clinical setting could be sepsis.   CT R LE angio on 2/15L: scan without abscess, no arterial occlusion or significant stenosis is identified. Right greater than left bilateral lower extremity soft tissue swelling. No rim-enhancing fluid collection.  LE duplex negative for clots   blood culture Neg  wound care recs appreciated, unlikely cellulitis  Finished antibiotic course with doxy

## 2023-02-22 NOTE — PROGRESS NOTE ADULT - PROBLEM SELECTOR PROBLEM 3
Positive D-dimer Detail Level: Simple Assessment (Free Text): The patient verified their identity with their photo ID and consented to evaluation and management of their medical condition through telehealth. This visit was conducted in real-time with an audio and video platform, [email or web address] during the [address] during a state of national emergency. This telehealth visit was medically necessary to prevent the community spread of COVID-19. \\n\\nThe patient is aware that we will bill their insurance for this telehealth visit following insurance guidelines. \\n\\nFace to face time with the patient was 15 minutes. \\n\\nConsent:\\nPatient consented to the following prior to the visit: \"I consent and understand that I am initiating a synchronous video health session with my healthcare provider and will be asked to confirm my identity with photo identification. I understand that there are potential risks to this technology, including interruptions, potential data breaches, and technical difficulties. Poor video quality may interfere with my healthcare providerâs ability to accurately diagnose my condition. I understand that my health care provider or I can discontinue the telemedicine consult/visit if it is felt that the videoconferencing connections are not adequate for the situation. I also understand that my insurance carrier will be billed for healthcare services rendered. \" Recommendation Preamble: Assessment:

## 2023-02-22 NOTE — PROGRESS NOTE ADULT - PROBLEM SELECTOR PLAN 4
Distended abdomen/Non tender  Abd U/S last admission showed hepatic steatosis  Abd U/S show fatty liver. No s/s masses or cirrhosis      - Follow up outpt with GI   - Monitor LFTs

## 2023-02-22 NOTE — PROGRESS NOTE ADULT - SUBJECTIVE AND OBJECTIVE BOX
[facility]  Dr. Silvana Gracia  Pager [phone]    Patient is a 32y old  Male who presents with a chief complaint of RLE swelling and redness (20 Feb 2023 12:15)    SUBJECTIVE / OVERNIGHT EVENTS:    No events overnight. This AM, patient without n/v/d/cp/sob.  Pt reports feeling well and denies any acute complaints at this time.     MEDICATIONS  (STANDING):  doxycycline monohydrate Capsule 100 milliGRAM(s) Oral every 12 hours  enoxaparin Injectable 40 milliGRAM(s) SubCutaneous every 24 hours  folic acid 1 milliGRAM(s) Oral daily  influenza   Vaccine 0.5 milliLiter(s) IntraMuscular once  multivitamin 1 Tablet(s) Oral daily  thiamine IVPB 500 milliGRAM(s) IV Intermittent every 24 hours    MEDICATIONS  (PRN):  acetaminophen     Tablet .. 650 milliGRAM(s) Oral every 6 hours PRN Temp greater or equal to 38C (100.4F), Mild Pain (1 - 3)  aluminum hydroxide/magnesium hydroxide/simethicone Suspension 30 milliLiter(s) Oral every 4 hours PRN Dyspepsia  melatonin 3 milliGRAM(s) Oral at bedtime PRN Insomnia  ondansetron Injectable 4 milliGRAM(s) IV Push every 8 hours PRN Nausea and/or Vomiting      PHYSICAL EXAM:  Vital Signs Last 24 Hrs  T(C): 36.7 (22 Feb 2023 09:29), Max: 37.2 (22 Feb 2023 01:23)  T(F): 98.1 (22 Feb 2023 09:29), Max: 99 (22 Feb 2023 01:23)  HR: 81 (22 Feb 2023 09:29) (70 - 81)  BP: 114/67 (22 Feb 2023 09:29) (100/63 - 114/68)  BP(mean): --  RR: 18 (22 Feb 2023 09:29) (17 - 19)  SpO2: 100% (22 Feb 2023 09:29) (98% - 100%)    Parameters below as of 22 Feb 2023 09:29  Patient On (Oxygen Delivery Method): room air    GENERAL: NAD, well-developed  HEAD:  Atraumatic, Normocephalic, MMM  CHEST/LUNG: No use of accessory muscles, CTAB, breathing non-labored  COR: RR, no mrcg  ABD: Soft, ND/NT, +BS  PSYCH: AAOx3  NEUROLOGY: CN II-XII grossly intact, moving all extremities  SKIN: RLE wound   EXT: no LE edema noted B/L     LABS:  CAPILLARY BLOOD GLUCOSE      RADIOLOGY & ADDITIONAL TESTS:    Imaging Personally Reviewed -     Imaging Reviewed -     Consultant(s) Notes Reviewed -       Care Discussed with Consultants/Other Providers -

## 2023-02-22 NOTE — PROGRESS NOTE ADULT - PROBLEM SELECTOR PLAN 2
These hematological changes were attributed to ETOH during last admission  LDH mildly elevated, haptoglobin normal, Iron studies c/w QUITA, Folate/B12 both WNL,   reticulocyte index hypoproliferative (2/2 severe malnutrition and QUITA)   Does not meet 4Ts for HIT. HIV negative   Now improving   restarted DVT ppx on 2/17

## 2023-02-22 NOTE — PROGRESS NOTE ADULT - PROBLEM SELECTOR PLAN 6
CT on 2/15: 2.1 cm patchy opacity is noted in the left lower lobe. Etiology is unclear.     - will need to have Follow-up CT scan is recommended in 3 months to ensure complete resolution.

## 2023-02-23 PROCEDURE — 99231 SBSQ HOSP IP/OBS SF/LOW 25: CPT

## 2023-02-23 RX ORDER — THIAMINE MONONITRATE (VIT B1) 100 MG
100 TABLET ORAL DAILY
Refills: 0 | Status: DISCONTINUED | OUTPATIENT
Start: 2023-02-23 | End: 2023-02-24

## 2023-02-23 RX ADMIN — Medication 1 TABLET(S): at 13:05

## 2023-02-23 RX ADMIN — ENOXAPARIN SODIUM 40 MILLIGRAM(S): 100 INJECTION SUBCUTANEOUS at 18:23

## 2023-02-23 RX ADMIN — Medication 1 MILLIGRAM(S): at 13:05

## 2023-02-23 RX ADMIN — Medication 105 MILLIGRAM(S): at 06:47

## 2023-02-23 NOTE — PROGRESS NOTE ADULT - PROBLEM SELECTOR PLAN 7
DVT ppx: Lovenox     Patient hemodynamically stable for discharge. Needs shelter placement per SW-> still pending.     ****Of note: (patient identified as Priscila Horton in hospital admission in May 2022,  02-Mar-2022) previous MR number [id number]). There may be a concern pt goes to hospital to hospital for shelter

## 2023-02-23 NOTE — DIETITIAN INITIAL EVALUATION ADULT - ORAL INTAKE PTA/DIET HISTORY
Patient is Macanese speaking,  services with agent# [id number], Renee is used during nutrition interview. Patient reports height of 5'6", weight ~200lbs. Patient denies any appetite or weight change PTA. Patient follows a regular diet at home, does not have any food allergies.

## 2023-02-23 NOTE — DIETITIAN INITIAL EVALUATION ADULT - PERTINENT MEDS FT
MEDICATIONS  (STANDING):  enoxaparin Injectable 40 milliGRAM(s) SubCutaneous every 24 hours  folic acid 1 milliGRAM(s) Oral daily  influenza   Vaccine 0.5 milliLiter(s) IntraMuscular once  multivitamin 1 Tablet(s) Oral daily  thiamine 100 milliGRAM(s) Oral daily    MEDICATIONS  (PRN):  acetaminophen     Tablet .. 650 milliGRAM(s) Oral every 6 hours PRN Temp greater or equal to 38C (100.4F), Mild Pain (1 - 3)  aluminum hydroxide/magnesium hydroxide/simethicone Suspension 30 milliLiter(s) Oral every 4 hours PRN Dyspepsia  melatonin 3 milliGRAM(s) Oral at bedtime PRN Insomnia  ondansetron Injectable 4 milliGRAM(s) IV Push every 8 hours PRN Nausea and/or Vomiting

## 2023-02-23 NOTE — DIETITIAN INITIAL EVALUATION ADULT - FLUID ACCUMULATION
As per RN flow sheet, patient has 1+ generalized edema, 2+ edema to right foot, 3+ edema to right leg.

## 2023-02-23 NOTE — PROGRESS NOTE ADULT - SUBJECTIVE AND OBJECTIVE BOX
[facility]  Dr. Silvana Gracia  Pager [phone]    Patient is a 32y old  Male who presents with a chief complaint of RLE swelling and redness (20 Feb 2023 12:15)    SUBJECTIVE / OVERNIGHT EVENTS:    No events overnight. This AM, patient without n/v/d/cp/sob.  Pt reports feeling well and denies any acute complaints at this time.     MEDICATIONS  (STANDING):  doxycycline monohydrate Capsule 100 milliGRAM(s) Oral every 12 hours  enoxaparin Injectable 40 milliGRAM(s) SubCutaneous every 24 hours  folic acid 1 milliGRAM(s) Oral daily  influenza   Vaccine 0.5 milliLiter(s) IntraMuscular once  multivitamin 1 Tablet(s) Oral daily  thiamine IVPB 500 milliGRAM(s) IV Intermittent every 24 hours    MEDICATIONS  (PRN):  acetaminophen     Tablet .. 650 milliGRAM(s) Oral every 6 hours PRN Temp greater or equal to 38C (100.4F), Mild Pain (1 - 3)  aluminum hydroxide/magnesium hydroxide/simethicone Suspension 30 milliLiter(s) Oral every 4 hours PRN Dyspepsia  melatonin 3 milliGRAM(s) Oral at bedtime PRN Insomnia  ondansetron Injectable 4 milliGRAM(s) IV Push every 8 hours PRN Nausea and/or Vomiting      PHYSICAL EXAM:  Vital Signs Last 24 Hrs  T(C): 36.4 (23 Feb 2023 09:29), Max: 36.9 (23 Feb 2023 06:56)  T(F): 97.6 (23 Feb 2023 09:29), Max: 98.4 (23 Feb 2023 06:56)  HR: 81 (23 Feb 2023 09:29) (73 - 82)  BP: 107/60 (23 Feb 2023 09:29) (107/60 - 122/61)  BP(mean): --  RR: 19 (23 Feb 2023 09:29) (18 - 19)  SpO2: 100% (23 Feb 2023 09:29) (97% - 100%)    Parameters below as of 23 Feb 2023 09:29  Patient On (Oxygen Delivery Method): room air        GENERAL: NAD, well-developed  HEAD:  Atraumatic, Normocephalic, MMM  CHEST/LUNG: No use of accessory muscles, CTAB, breathing non-labored  COR: RR, no mrcg  ABD: Soft, ND/NT, +BS  PSYCH: AAOx3  NEUROLOGY: CN II-XII grossly intact, moving all extremities  SKIN: RLE wound   EXT: no LE edema noted B/L     LABS:  CAPILLARY BLOOD GLUCOSE      RADIOLOGY & ADDITIONAL TESTS:    Imaging Personally Reviewed -     Imaging Reviewed -     Consultant(s) Notes Reviewed -       Care Discussed with Consultants/Other Providers -

## 2023-02-23 NOTE — DIETITIAN INITIAL EVALUATION ADULT - OTHER INFO
31 y/o M with pmhx of ETOH abuse, presumed alcohol induced cytopenias presented to the ED for worsening of cellulitis on his R leg. Now admitted for worsening RLE cellulitis with possible abscess and concern for alcohol withdrawal and concern for inability to care for self at home, per chart.   Patient reports good appetite during stay, consuming >% of meals. Denies any difficulty chewing or swallowing, any nausea, vomiting, diarrhea, constipation during visit. Last bowel movement 2/22/2023, per RN flow sheet. Current weight: 196.8lbs/ 89.3kg (2/16, per RN flow sheet). Noted with fluid related weight loss of 3.2lbs/1.6% BW. As per RN flow sheet, patient has 1+ generalized edema, 2+ edema to right foot, 3+ edema to right leg. Will continue to monitor weight trend. Noted patient has low potassium-3.4(2/20). On KCl for repletion. Patient is on multivitamin, thiamine, folic acid for micronutrient support. Patient is not interested in nutrition education at this time.

## 2023-02-24 ENCOUNTER — TRANSCRIPTION ENCOUNTER (OUTPATIENT)
Age: 33
End: 2023-02-24

## 2023-02-24 VITALS
DIASTOLIC BLOOD PRESSURE: 65 MMHG | HEART RATE: 76 BPM | SYSTOLIC BLOOD PRESSURE: 110 MMHG | OXYGEN SATURATION: 100 % | TEMPERATURE: 98 F | RESPIRATION RATE: 18 BRPM

## 2023-02-24 PROCEDURE — 99239 HOSP IP/OBS DSCHRG MGMT >30: CPT

## 2023-02-24 RX ADMIN — Medication 1 TABLET(S): at 12:08

## 2023-02-24 RX ADMIN — Medication 1 MILLIGRAM(S): at 12:07

## 2023-02-24 RX ADMIN — Medication 100 MILLIGRAM(S): at 12:07

## 2023-02-24 NOTE — DISCHARGE NOTE NURSING/CASE MANAGEMENT/SOCIAL WORK - PATIENT PORTAL LINK FT
You can access the FollowMyHealth Patient Portal offered by [facility] by registering at the following website: [email or web address]. By joining [facility]’s FollowMyHealth portal, you will also be able to view your health information using other applications (apps) compatible with our system.

## 2023-02-24 NOTE — DISCHARGE NOTE NURSING/CASE MANAGEMENT/SOCIAL WORK - NSDCPEFALRISK_GEN_ALL_CORE
For information on Fall & Injury Prevention, visit: [email or web address] OR  [email or web address] OR  https://www.cdc.gov/steadi/patient.html

## 2023-02-24 NOTE — DISCHARGE NOTE NURSING/CASE MANAGEMENT/SOCIAL WORK - NSDCFUADDAPPT_GEN_ALL_CORE_FT
Follow up with Gastroenterology , Psychiatry and Wound Care in 2 weeks, call for Appointment     Follow up as outpatient at [facility] [address] [place] [address] [phone] or Wound Center at [facility], [address] [place] [address] ([phone]) in 2 weeks , call for appointment

## 2023-02-24 NOTE — PROGRESS NOTE ADULT - PROBLEM SELECTOR PROBLEM 6
Abnormal CT scan, lung
Need for prophylactic measure

## 2023-02-24 NOTE — PROGRESS NOTE ADULT - ASSESSMENT
31 y/o M with pmhx of ETOH abuse, presumed alcohol induced cytopenias presented to the ED for worsening of cellulitis on his R leg. Now admitted for worsening RLE cellulitis with possible abscess and concern for alcohol withdrawal and concern for inability to care for self at home.
31 y/o M with pmhx of ETOH abuse, presumed alcohol induced cytopenias presented to the ED for worsening of cellulitis on his R leg. Now admitted for worsening RLE cellulitis with possible abscess and concern for alcohol withdrawal and concern for inability to care for self at home.
33 y/o M with pmhx of ETOH abuse, presumed alcohol induced cytopenias presented to the ED for worsening of cellulitis on his R leg. Now admitted for worsening RLE cellulitis with possible abscess and concern for alcohol withdrawal and concern for inability to care for self at home.
31 y/o M with pmhx of ETOH abuse, presumed alcohol induced cytopenias presented to the ED for worsening of cellulitis on his R leg. Now admitted for worsening RLE cellulitis with possible abscess and concern for alcohol withdrawal and concern for inability to care for self at home.
31 y/o M with pmhx of ETOH abuse, presumed alcohol induced cytopenias presented to the ED for worsening of cellulitis on his R leg. Now admitted for worsening RLE cellulitis with possible abscess and concern for alcohol withdrawal and concern for inability to care for self at home.
33 y/o M with pmhx of ETOH abuse, presumed alcohol induced cytopenias presented to the ED for worsening of cellulitis on his R leg. Now admitted for worsening RLE cellulitis with possible abscess and concern for alcohol withdrawal and concern for inability to care for self at home.
33 y/o M with pmhx of ETOH abuse, presumed alcohol induced cytopenias presented to the ED for worsening of cellulitis on his R leg. Now admitted for worsening RLE cellulitis with possible abscess and concern for alcohol withdrawal and concern for inability to care for self at home.
31 y/o M with pmhx of ETOH abuse, presumed alcohol induced cytopenias presented to the ED for worsening of cellulitis on his R leg. Now admitted for worsening RLE cellulitis with possible abscess and concern for alcohol withdrawal and concern for inability to care for self at home.
31 y/o M with pmhx of ETOH abuse, presumed alcohol induced cytopenias presented to the ED for worsening of cellulitis on his R leg. Now admitted for worsening RLE cellulitis with possible abscess and concern for alcohol withdrawal and concern for inability to care for self at home.
33 y/o M with pmhx of ETOH abuse, presumed alcohol induced cytopenias presented to the ED for worsening of cellulitis on his R leg. Now admitted for worsening RLE cellulitis with possible abscess and concern for alcohol withdrawal and concern for inability to care for self at home.

## 2023-02-24 NOTE — PROGRESS NOTE ADULT - PROBLEM SELECTOR PROBLEM 1
Acute sepsis

## 2023-02-24 NOTE — PROGRESS NOTE ADULT - PROBLEM SELECTOR PLAN 7
DVT ppx: Lovenox     DC to shelter today. Time spent 50 mins     ****Of note: (patient identified as Priscila Horton in hospital admission in May 2022,  02-Mar-2022) previous MR number [id number]). There may be a concern pt goes to hospital to hospital for shelter

## 2023-02-24 NOTE — PROGRESS NOTE ADULT - REASON FOR ADMISSION
RLE swelling and redness

## 2023-02-24 NOTE — PROGRESS NOTE ADULT - SUBJECTIVE AND OBJECTIVE BOX
[facility]  Dr. Silvana Gracia  Pager [phone]    Patient is a 32y old  Male who presents with a chief complaint of RLE swelling and redness (20 Feb 2023 12:15)    SUBJECTIVE / OVERNIGHT EVENTS:    No events overnight. This AM, patient without n/v/d/cp/sob.  Pt reports feeling well and denies any acute complaints at this time.     MEDICATIONS  (STANDING):  doxycycline monohydrate Capsule 100 milliGRAM(s) Oral every 12 hours  enoxaparin Injectable 40 milliGRAM(s) SubCutaneous every 24 hours  folic acid 1 milliGRAM(s) Oral daily  influenza   Vaccine 0.5 milliLiter(s) IntraMuscular once  multivitamin 1 Tablet(s) Oral daily  thiamine IVPB 500 milliGRAM(s) IV Intermittent every 24 hours    MEDICATIONS  (PRN):  acetaminophen     Tablet .. 650 milliGRAM(s) Oral every 6 hours PRN Temp greater or equal to 38C (100.4F), Mild Pain (1 - 3)  aluminum hydroxide/magnesium hydroxide/simethicone Suspension 30 milliLiter(s) Oral every 4 hours PRN Dyspepsia  melatonin 3 milliGRAM(s) Oral at bedtime PRN Insomnia  ondansetron Injectable 4 milliGRAM(s) IV Push every 8 hours PRN Nausea and/or Vomiting      PHYSICAL EXAM:  Vital Signs Last 24 Hrs  T(C): 36.9 (24 Feb 2023 10:14), Max: 36.9 (24 Feb 2023 05:30)  T(F): 98.5 (24 Feb 2023 10:14), Max: 98.5 (24 Feb 2023 10:14)  HR: 76 (24 Feb 2023 10:14) (72 - 80)  BP: 110/65 (24 Feb 2023 10:14) (110/65 - 114/62)  BP(mean): --  RR: 18 (24 Feb 2023 10:14) (18 - 19)  SpO2: 100% (24 Feb 2023 10:14) (97% - 100%)    Parameters below as of 24 Feb 2023 05:30  Patient On (Oxygen Delivery Method): room air          GENERAL: NAD, well-developed  HEAD:  Atraumatic, Normocephalic, MMM  CHEST/LUNG: No use of accessory muscles, CTAB, breathing non-labored  COR: RR, no mrcg  ABD: Soft, ND/NT, +BS  PSYCH: AAOx3  NEUROLOGY: CN II-XII grossly intact, moving all extremities  SKIN: RLE wound   EXT: no LE edema noted B/L     LABS:  CAPILLARY BLOOD GLUCOSE      RADIOLOGY & ADDITIONAL TESTS:    Imaging Personally Reviewed -     Imaging Reviewed -     Consultant(s) Notes Reviewed -       Care Discussed with Consultants/Other Providers -

## 2023-03-15 ENCOUNTER — INPATIENT (INPATIENT)
Facility: HOSPITAL | Age: 33
LOS: 4 days | Discharge: HOME | End: 2023-03-20
Attending: HOSPITALIST | Admitting: HOSPITALIST
Payer: MEDICAID

## 2023-03-15 VITALS
DIASTOLIC BLOOD PRESSURE: 73 MMHG | HEART RATE: 87 BPM | TEMPERATURE: 99 F | RESPIRATION RATE: 18 BRPM | SYSTOLIC BLOOD PRESSURE: 120 MMHG | OXYGEN SATURATION: 100 % | HEIGHT: 66 IN

## 2023-03-15 DIAGNOSIS — F10.10 ALCOHOL ABUSE, UNCOMPLICATED: ICD-10-CM

## 2023-03-15 DIAGNOSIS — Z29.9 ENCOUNTER FOR PROPHYLACTIC MEASURES, UNSPECIFIED: ICD-10-CM

## 2023-03-15 DIAGNOSIS — D61.818 OTHER PANCYTOPENIA: ICD-10-CM

## 2023-03-15 DIAGNOSIS — R26.2 DIFFICULTY IN WALKING, NOT ELSEWHERE CLASSIFIED: ICD-10-CM

## 2023-03-15 DIAGNOSIS — L03.90 CELLULITIS, UNSPECIFIED: ICD-10-CM

## 2023-03-15 PROBLEM — L03.119 CELLULITIS OF UNSPECIFIED PART OF LIMB: Chronic | Status: ACTIVE | Noted: 2023-02-15

## 2023-03-15 LAB
ALBUMIN SERPL ELPH-MCNC: 3.5 G/DL — SIGNIFICANT CHANGE UP (ref 3.3–5)
ALP SERPL-CCNC: 154 U/L — HIGH (ref 40–120)
ALT FLD-CCNC: 22 U/L — SIGNIFICANT CHANGE UP (ref 4–41)
ANION GAP SERPL CALC-SCNC: 14 MMOL/L — SIGNIFICANT CHANGE UP (ref 7–14)
APAP SERPL-MCNC: <10 UG/ML — LOW (ref 15–25)
AST SERPL-CCNC: 53 U/L — HIGH (ref 4–40)
BASOPHILS # BLD AUTO: 0.05 K/UL — SIGNIFICANT CHANGE UP (ref 0–0.2)
BASOPHILS NFR BLD AUTO: 1.7 % — SIGNIFICANT CHANGE UP (ref 0–2)
BILIRUB SERPL-MCNC: 0.6 MG/DL — SIGNIFICANT CHANGE UP (ref 0.2–1.2)
BLOOD GAS VENOUS COMPREHENSIVE RESULT: SIGNIFICANT CHANGE UP
BUN SERPL-MCNC: 4 MG/DL — LOW (ref 7–23)
CALCIUM SERPL-MCNC: 8.5 MG/DL — SIGNIFICANT CHANGE UP (ref 8.4–10.5)
CHLORIDE SERPL-SCNC: 104 MMOL/L — SIGNIFICANT CHANGE UP (ref 98–107)
CO2 SERPL-SCNC: 21 MMOL/L — LOW (ref 22–31)
CREAT SERPL-MCNC: 0.43 MG/DL — LOW (ref 0.5–1.3)
CRP SERPL-MCNC: <3 MG/L — SIGNIFICANT CHANGE UP
EGFR: 145 ML/MIN/1.73M2 — SIGNIFICANT CHANGE UP
EOSINOPHIL # BLD AUTO: 0.18 K/UL — SIGNIFICANT CHANGE UP (ref 0–0.5)
EOSINOPHIL NFR BLD AUTO: 6 % — SIGNIFICANT CHANGE UP (ref 0–6)
ERYTHROCYTE [SEDIMENTATION RATE] IN BLOOD: 74 MM/HR — HIGH (ref 1–15)
ETHANOL SERPL-MCNC: 192 MG/DL — HIGH
GLUCOSE SERPL-MCNC: 97 MG/DL — SIGNIFICANT CHANGE UP (ref 70–99)
HCT VFR BLD CALC: 28.2 % — LOW (ref 39–50)
HGB BLD-MCNC: 8.6 G/DL — LOW (ref 13–17)
IANC: 1 K/UL — LOW (ref 1.8–7.4)
LYMPHOCYTES # BLD AUTO: 1.08 K/UL — SIGNIFICANT CHANGE UP (ref 1–3.3)
LYMPHOCYTES # BLD AUTO: 35.4 % — SIGNIFICANT CHANGE UP (ref 13–44)
MCHC RBC-ENTMCNC: 23.8 PG — LOW (ref 27–34)
MCHC RBC-ENTMCNC: 30.5 GM/DL — LOW (ref 32–36)
MCV RBC AUTO: 78.1 FL — LOW (ref 80–100)
MONOCYTES # BLD AUTO: 0.13 K/UL — SIGNIFICANT CHANGE UP (ref 0–0.9)
MONOCYTES NFR BLD AUTO: 4.3 % — SIGNIFICANT CHANGE UP (ref 2–14)
NEUTROPHILS # BLD AUTO: 1.42 K/UL — LOW (ref 1.8–7.4)
NEUTROPHILS NFR BLD AUTO: 46.6 % — SIGNIFICANT CHANGE UP (ref 43–77)
PLATELET # BLD AUTO: 51 K/UL — LOW (ref 150–400)
POTASSIUM SERPL-MCNC: 3.3 MMOL/L — LOW (ref 3.5–5.3)
POTASSIUM SERPL-SCNC: 3.3 MMOL/L — LOW (ref 3.5–5.3)
PROT SERPL-MCNC: 8.4 G/DL — HIGH (ref 6–8.3)
RBC # BLD: 3.61 M/UL — LOW (ref 4.2–5.8)
RBC # FLD: 20.3 % — HIGH (ref 10.3–14.5)
SALICYLATES SERPL-MCNC: <0.3 MG/DL — LOW (ref 15–30)
SARS-COV-2 RNA SPEC QL NAA+PROBE: SIGNIFICANT CHANGE UP
SODIUM SERPL-SCNC: 139 MMOL/L — SIGNIFICANT CHANGE UP (ref 135–145)
TOXICOLOGY SCREEN, DRUGS OF ABUSE, SERUM RESULT: SIGNIFICANT CHANGE UP
WBC # BLD: 3.05 K/UL — LOW (ref 3.8–10.5)
WBC # FLD AUTO: 3.05 K/UL — LOW (ref 3.8–10.5)

## 2023-03-15 PROCEDURE — 99223 1ST HOSP IP/OBS HIGH 75: CPT

## 2023-03-15 PROCEDURE — 99285 EMERGENCY DEPT VISIT HI MDM: CPT

## 2023-03-15 PROCEDURE — 73590 X-RAY EXAM OF LOWER LEG: CPT | Mod: 26,RT

## 2023-03-15 PROCEDURE — 76882 US LMTD JT/FCL EVL NVASC XTR: CPT | Mod: 26,RT

## 2023-03-15 PROCEDURE — 73630 X-RAY EXAM OF FOOT: CPT | Mod: 26,RT

## 2023-03-15 PROCEDURE — 93971 EXTREMITY STUDY: CPT | Mod: 26,LT

## 2023-03-15 RX ORDER — POTASSIUM CHLORIDE 20 MEQ
40 PACKET (EA) ORAL ONCE
Refills: 0 | Status: COMPLETED | OUTPATIENT
Start: 2023-03-15 | End: 2023-03-15

## 2023-03-15 RX ORDER — FERROUS SULFATE 325(65) MG
325 TABLET ORAL DAILY
Refills: 0 | Status: DISCONTINUED | OUTPATIENT
Start: 2023-03-15 | End: 2023-03-20

## 2023-03-15 RX ORDER — CEFAZOLIN SODIUM 1 G
VIAL (EA) INJECTION
Refills: 0 | Status: DISCONTINUED | OUTPATIENT
Start: 2023-03-15 | End: 2023-03-16

## 2023-03-15 RX ORDER — PANTOPRAZOLE SODIUM 20 MG/1
40 TABLET, DELAYED RELEASE ORAL
Refills: 0 | Status: DISCONTINUED | OUTPATIENT
Start: 2023-03-15 | End: 2023-03-20

## 2023-03-15 RX ORDER — SODIUM CHLORIDE 9 MG/ML
1000 INJECTION INTRAMUSCULAR; INTRAVENOUS; SUBCUTANEOUS ONCE
Refills: 0 | Status: COMPLETED | OUTPATIENT
Start: 2023-03-15 | End: 2023-03-15

## 2023-03-15 RX ORDER — INFLUENZA VIRUS VACCINE 15; 15; 15; 15 UG/.5ML; UG/.5ML; UG/.5ML; UG/.5ML
0.5 SUSPENSION INTRAMUSCULAR ONCE
Refills: 0 | Status: DISCONTINUED | OUTPATIENT
Start: 2023-03-15 | End: 2023-03-20

## 2023-03-15 RX ORDER — CEFAZOLIN SODIUM 1 G
2000 VIAL (EA) INJECTION EVERY 8 HOURS
Refills: 0 | Status: DISCONTINUED | OUTPATIENT
Start: 2023-03-15 | End: 2023-03-16

## 2023-03-15 RX ORDER — ACETAMINOPHEN 500 MG
1000 TABLET ORAL ONCE
Refills: 0 | Status: DISCONTINUED | OUTPATIENT
Start: 2023-03-15 | End: 2023-03-15

## 2023-03-15 RX ORDER — THIAMINE MONONITRATE (VIT B1) 100 MG
100 TABLET ORAL ONCE
Refills: 0 | Status: COMPLETED | OUTPATIENT
Start: 2023-03-15 | End: 2023-03-15

## 2023-03-15 RX ORDER — FOLIC ACID 0.8 MG
1 TABLET ORAL DAILY
Refills: 0 | Status: DISCONTINUED | OUTPATIENT
Start: 2023-03-15 | End: 2023-03-20

## 2023-03-15 RX ORDER — KETOROLAC TROMETHAMINE 30 MG/ML
15 SYRINGE (ML) INJECTION ONCE
Refills: 0 | Status: DISCONTINUED | OUTPATIENT
Start: 2023-03-15 | End: 2023-03-15

## 2023-03-15 RX ORDER — FOLIC ACID 0.8 MG
1 TABLET ORAL ONCE
Refills: 0 | Status: COMPLETED | OUTPATIENT
Start: 2023-03-15 | End: 2023-03-15

## 2023-03-15 RX ORDER — CEFAZOLIN SODIUM 1 G
2000 VIAL (EA) INJECTION ONCE
Refills: 0 | Status: COMPLETED | OUTPATIENT
Start: 2023-03-15 | End: 2023-03-15

## 2023-03-15 RX ORDER — THIAMINE MONONITRATE (VIT B1) 100 MG
100 TABLET ORAL DAILY
Refills: 0 | Status: DISCONTINUED | OUTPATIENT
Start: 2023-03-15 | End: 2023-03-20

## 2023-03-15 RX ADMIN — Medication 100 MILLIGRAM(S): at 06:27

## 2023-03-15 RX ADMIN — Medication 100 MILLIGRAM(S): at 23:35

## 2023-03-15 RX ADMIN — Medication 15 MILLIGRAM(S): at 03:00

## 2023-03-15 RX ADMIN — Medication 1 MILLIGRAM(S): at 06:27

## 2023-03-15 RX ADMIN — Medication 100 MILLIGRAM(S): at 16:57

## 2023-03-15 RX ADMIN — Medication 2 MILLIGRAM(S): at 10:32

## 2023-03-15 RX ADMIN — Medication 2 MILLIGRAM(S): at 17:03

## 2023-03-15 RX ADMIN — SODIUM CHLORIDE 1000 MILLILITER(S): 9 INJECTION INTRAMUSCULAR; INTRAVENOUS; SUBCUTANEOUS at 06:27

## 2023-03-15 RX ADMIN — Medication 15 MILLIGRAM(S): at 02:29

## 2023-03-15 RX ADMIN — Medication 40 MILLIEQUIVALENT(S): at 06:27

## 2023-03-15 NOTE — ED PROVIDER NOTE - ATTENDING CONTRIBUTION TO CARE
Afebrile. Awake and Alert. CN II-XII grossly intact. Moves all extremities without lateralization. b/l LE swelling, healed ulcer anterior distal shin, equal temperature both legs.

## 2023-03-15 NOTE — H&P ADULT - PROBLEM SELECTOR PLAN 3
Suspect in setting of chronic EtOH use. Usually improves on prior admissions with abstinence.  -continue to trend CBC with diff  -hematology eval if not improving

## 2023-03-15 NOTE — ED ADULT NURSE REASSESSMENT NOTE - NS ED NURSE REASSESS COMMENT FT1
Pt unable to ambulate with cane. Unable to catch balance. Write and MD Carver had to assist pt safely into stretcher. Respirations even and unlabored. No acute distress noted. Bed in lowest position, wheels locked, appropriately side rails up, safety maintained. Awaiting further orders.

## 2023-03-15 NOTE — H&P ADULT - NSHPLABSRESULTS_GEN_ALL_CORE
8.6    3.05  )-----------( 51       ( 15 Mar 2023 02:20 )             28.2     03-15    139  |  104  |  4<L>  ----------------------------<  97  3.3<L>   |  21<L>  |  0.43<L>    Ca    8.5      15 Mar 2023 02:20    TPro  8.4<H>  /  Alb  3.5  /  TBili  0.6  /  DBili  x   /  AST  53<H>  /  ALT  22  /  AlkPhos  154<H>  03-15    ESR 74, CRP <3    EKG personally reviewed: NSR, QTc 459ms    < from: US Duplex Venous Lower Ext Ltd, Right (03.15.23 @ 04:49) >    FINDINGS:    There is normal compressibility of the right common femoral, femoral and   popliteal veins.  The contralateral common femoral vein is patent.  Doppler examination shows normal spontaneous and phasic flow.    No calf vein thrombosis is detected. Calf edema.    IMPRESSION:  No evidence of right lower extremity deep venous thrombosis.    < from: US Extremity Nonvasc Limited, Right (03.15.23 @ 03:06) >    FINDINGS:  Subcutaneous edema and hyperemia at the area of concern along the right   ankle. No discrete collection is seen.    IMPRESSION:  Subcutaneous edema and hyperemia about the right ankle. Findings may   reflect cellulitis. No discrete collection is seen.

## 2023-03-15 NOTE — ED ADULT TRIAGE NOTE - CHIEF COMPLAINT QUOTE
Pt c/o lower right leg pain/wound X 1 year, worsening this week. Arrives ambulatory. No chest pain, sob, abd pain, n/v/d, fevers/chills verbalized.  PMH alcohol abuse, cellulitis to leg.

## 2023-03-15 NOTE — ED PROVIDER NOTE - OBJECTIVE STATEMENT
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 Pt was recently admitted for this in Feb - at that time tachycardic, neutropenic and tx for sepsis though ultimately per last notes, was deemed likely not acutely infected. Was dc with wound care who he went to but said he didn't like bc they made him 'wait half the day'. In interim went to [facility] where he said they gave him a ' little pills for pain and antibiotics' and then dc him 2d ago. He states still having LE pain and redness and feels he needs more pain meds, something for edema, and abbx. Admits that pain is unchanged - neither worsening nor improving but still intense odalys with palpation. No inc redness, purulence or fever. Edematous - was previously was using ACE wraps but said they were stolen on the street. Still smoking/drinking when he can.

## 2023-03-15 NOTE — ED ADULT NURSE REASSESSMENT NOTE - NS ED NURSE REASSESS COMMENT FT1
report given to DUNCAN Jackman. pt asleep in stretcher, calm and cooperative. CIWA and vitals documented. respirations even and unlabored. bed in lowest position, side rails up, wheels locked. safety maintained.

## 2023-03-15 NOTE — CONSULT NOTE ADULT - ASSESSMENT
Assessment and plan: 33 year old Maori-speaking male, undomiciled, history of chronic EtOH abuse and withdrawal and chronic RLE wound presenting with RLE pain, concern for recurrent cellulitis.    Wound Consult requested to assist w/ management of RLE wound. Patient known to wound care surgery services seen last for RLE chronic wound. Previously seen last on 2/17/23. As per chart reviewed,  patient was discharged on 2/24 to a shelter as per notes. Interviewed is limited on today's exam as patient is sleepy and unable to keep eyes open.   On todays exam RLE with ACE wraps in place, removed for skin assessment patient with lymphedema and notable non pitting edema to right lower leg in comparison to left leg, right foot and ankle with edema (mostly anteriorly)  -Previous wound healed, now presenting with localized erythema to anterior lower leg   -Leg Measurements:   Right ankle: 27.5  Right leg 42.5    -No open wounds  -RLE with localized area of erythema measuring [id number]. No increased warmth, no crepitus. Low suspicion for soft tissue infection/cellulitis   -Patient receiving IV antibiotics for soft tissue infection, will recommend considering monitor off antibiotics   -sedimentation rate elevated secondary to?, also elevated in previous admission. Consider ID consult to evaluate   -+ tenderness to right leg on palpation patient reports mostly anterior to medial, denies pain to left leg   -During previous admission patient completed Doxycycline PO for soft tissue infection to RLE.   -patient reports tenderness of anterior and medial lower leg   -No drainable abscess   -No purulence   -Bilateral feet with poor hygiene, clean with soap and water daily. Pat dry well between toes.   -US duplex negative for DVT in RLE.   -US non vascular of right ankle: Subcutaneous edema and hyperemia about the right ankle. Findings may reflect cellulitis. No discrete collection is seen.  -BLE elevation with pillows   Moisturize intact skin w/ SWEEN moisturizer daily. Avoid between toes.   -Topical Recommendations: Clean bilateral legs with soap and water.  Pat dry.  Apply Sween 24 moisturizer to bilateral legs, avoid between toes. Cover with Kerlix wrap. Compress RLE with ACE wraps. Change daily.   -Once discharge, patient should f/u with wound care outpatient for ongoing management of lymphedema/edema to RLE     Alcoholism   -Management as per primary team   -Advised patient on possible associated complications, patient verbalized understanding    Thrombocytopenia   -Management as per primary team     Homelessness   -Pending SW consult to assist in safe discharge      Please obtain height and weight to calculate BMI   Patient will be admitted to the hospital as per primary team   Continue turning and positioning w/ offloading assistive devices as per protocol  Continue w/ Pericare as per protocol  Waffle Cushion to chair when oob to chair  Continue w/ low air loss fluidized bed surface     Care as per medicine, will follow w/ you. Please reconsult if needed it.     Upon discharge f/u as outpatient at [facility] [address] [place] [address] [phone] or Wound Center at [facility], [address] [place] [address] ([phone]).   Seen w/ florencia Sales and D/w team    Thank you for this consult  BE Peter, CWOCN (pager #[phone] or available in [place])    If after 4PM or before 7:30AM on Mon-Friday or weekend/holiday please contact general surgery for urgent matters.   Team A- [id number]   Team B- [id number]  For non-urgent matters e-mail [email or web address]

## 2023-03-15 NOTE — ED ADULT NURSE REASSESSMENT NOTE - NS ED NURSE REASSESS COMMENT FT1
pt sleeping on stretcher, arousable to verbal stimuli. pt appears in NAD efren. RR even and unlabored. NSR on monitor. will continue to monitor.

## 2023-03-15 NOTE — PATIENT PROFILE ADULT - FALL HARM RISK - HARM RISK INTERVENTIONS
Assistance with ambulation/Assistance OOB with selected safe patient handling equipment/Communicate Risk of Fall with Harm to all staff/Discuss with provider need for PT consult/Monitor for mental status changes/Monitor gait and stability/Provide patient with walking aids - walker, cane, crutches/Reinforce activity limits and safety measures with patient and family/Tailored Fall Risk Interventions/Toileting schedule using arm’s reach rule for commode and bathroom/Use of alarms - bed, chair and/or voice tab/Visual Cue: Yellow wristband and red socks/Bed in lowest position, wheels locked, appropriate side rails in place/Call bell, personal items and telephone in reach/Instruct patient to call for assistance before getting out of bed or chair/Non-slip footwear when patient is out of bed/[place] to call system/Physically safe environment - no spills, clutter or unnecessary equipment/Purposeful Proactive Rounding/Room/bathroom lighting operational, light cord in reach

## 2023-03-15 NOTE — ED PROVIDER NOTE - PROGRESS NOTE DETAILS
Hgb and plt compared to previous trend. PHILIPPE Mehul PGY2: attempted to ambulate pt - R leg unsteady and even w/ cane, took 2 steps and nearly stumbled and fell.  Vulnerable, no access to meds, poor f/u. Will admit for pain control, re-eval of chronic LE edema and assessment of possible cellulitis Mehul PGY2: attempted to ambulate pt - R leg unsteady and even w/ cane, took 2 steps and nearly stumbled and fell.  Vulnerable, no access to meds, poor f/u. Will admit for pain control, re-eval of chronic LE edema and assessment of possible cellulitis.

## 2023-03-15 NOTE — ED PROVIDER NOTE - CLINICAL SUMMARY MEDICAL DECISION MAKING FREE TEXT BOX
Mehul PGY2: 34yo  Ethiopian speaking M who lives on street with chronic RLE wound/swelling/vascular issues returns for pain/swelling - not necessarily worse but uncontrolled with poor outpt f/u. May represent acute on chronic cellulitis but less likely, more likely on-going chronic concerns at baseline based on pts own hx. No fever, no hiro pus, no clear area of collection. Will send labs including ESR/CRP given prior hx, xray leg and foot and US to r/o underlying foot collection.

## 2023-03-15 NOTE — H&P ADULT - PROBLEM SELECTOR PLAN 2
Patient with daily alcohol intake, last on 3/14. Currently not in withdrawal  -monitor on CIWA with symptom-triggered Ativan  -c/w MV, folic acid, thiamine  -SBIRT consult Patient with daily alcohol intake, last on 3/14. Currently not in withdrawal  -monitor on CIWA with symptom-triggered Ativan  -c/w MV, folic acid, thiamine  -elevated liver enzymes likely in setting of recent EtOH use- continue to trend  -mild hypokalemia to 3.3 noted, received 40mEq repletion in the ER, continue to trend  -SBIRT consult

## 2023-03-15 NOTE — H&P ADULT - PROBLEM SELECTOR PLAN 1
RLE nonhealing wound which is erythematous and tender to touch, elevated ESR for pt's age  -will start Ancef empirically  -wound care consult  -check MSSA/MRSA RLE nonhealing wound which is erythematous and tender to touch, elevated ESR for pt's age. Unclear if this is true cellulitis, pt seen by wound care team in February 2023 with no concern for cellulitis at that time.  -will start Ancef empirically while pending wound care consult  -check MSSA/MRSA

## 2023-03-15 NOTE — ED ADULT NURSE REASSESSMENT NOTE - NS ED NURSE REASSESS COMMENT FT1
pt asleep in stretcher. denies sob, chest pain. CIWA and vitals documented.  safety maintained. will continue to monitor. pt asleep in stretcher. denies sob, chest pain. CIWA and vitals documented.  sinus tachycardic on monitor. safety maintained. will continue to monitor.

## 2023-03-15 NOTE — H&P ADULT - PROBLEM SELECTOR PLAN 4
DVT ppx: SCDs given thrombocytopenia  Diet: Regular  Dispo: Pt is currently homeless, will need SW eval for possible shelter placement

## 2023-03-15 NOTE — CONSULT NOTE ADULT - SUBJECTIVE AND OBJECTIVE BOX
Wound SURGERY CONSULT NOTE    HPI:33 year old French-speaking male, undomiciled, history of chronic EtOH abuse and withdrawal and chronic RLE wound presenting with RLE pain. Pt is a limited historian. 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 Pt was at [facility] until 2 days ago where he was given pain medications and abx. He states that the pain is the same in his leg. Reports chronic alcohol use, last drank 3 beers and "a little vodka" yesterday around noon. Does not feel like he is currently withdrawing. (15 Mar 2023 10:18)    Wound consult requested to assist w/ management of  chronic RLE wound with swelling and cellulitis. Patient reports he came to the hospital secondary to right leg pain. Endorses he is living on the streets and that he does not wear compression socks or ACE wraps currently. Reports he does not provide any wound care to right leg. Interview limited as patient sleeping during interview, unable to maintain eyes open. Reports leg tenderness on palpation.       Current Diet: Diet, Regular (03-15-23 @ 09:34)      PAST MEDICAL & SURGICAL HISTORY:  ETOH abuse  Cellulitis, leg  No significant past surgical history    REVIEW OF SYSTEMS: Pt unable to offer  General/ Breast/ Skin/ Neuro/ MSK: see HPI  All other systems negative    MEDICATIONS  (STANDING):  ceFAZolin   IVPB 2000 milliGRAM(s) IV Intermittent every 8 hours  ceFAZolin   IVPB      ferrous    sulfate 325 milliGRAM(s) Oral daily  folic acid 1 milliGRAM(s) Oral daily  multivitamin 1 Tablet(s) Oral daily  pantoprazole    Tablet 40 milliGRAM(s) Oral before breakfast  thiamine 100 milliGRAM(s) Oral daily    MEDICATIONS  (PRN):  LORazepam     Tablet 2 milliGRAM(s) Oral every 2 hours PRN CIWA-Ar score increase by 2 points and a total score of 7 or less  LORazepam     Tablet 2 milliGRAM(s) Oral every 1 hour PRN CIWA-Ar score 8 or greater      Allergies    No Known Allergies    Intolerances    SOCIAL HISTORY: ETOH, Homeless     FAMILY HISTORY:      PHYSICAL EXAM:  Vital Signs Last 24 Hrs  T(C): 37.1 (15 Mar 2023 19:40), Max: 37.1 (15 Mar 2023 00:04)  T(F): 98.8 (15 Mar 2023 19:40), Max: 98.8 (15 Mar 2023 00:04)  HR: 105 (15 Mar 2023 19:40) (86 - 105)  BP: 121/82 (15 Mar 2023 19:40) (111/70 - 128/73)  BP(mean): --  RR: 15 (15 Mar 2023 19:40) (15 - 22)  SpO2: 99% (15 Mar 2023 19:40) (96% - 100%)    Parameters below as of 15 Mar 2023 19:40  Patient On (Oxygen Delivery Method): room air    Constitutional: NAD/Sleepy, unable to maintain eyes open during interviewed/ Patient mostly Luxembourger speaking, able to speak to patient in Native language.   Received in the EDU   HEENT: NC/AT, non icteric, mucosa moist, throat clear, trachea midline, neck supple  Cardiovascular:  tachy   Respiratory: NAD, RA.   Gastrointestinal soft NT/ND  Neurology : able to follow commands   Musculoskeletal: aROM, no deformities/ contractures.   Vascular: BLE equally warm, no cyanosis, clubbing, RLE with edema> Left.   DP/PT pulses +2 palpable  no overt ischemia noted  Bilateral feet with poor hygiene.   hemosiderin staining  RLE chronic trauma wound (Of note, patient known to wound care surgery  from previous admission in 22, reviewed records and patient comes up with different last name previously Priscila Horton same )- RLE wound now healed. Area of localized erythema to anterior lower leg measuring [id number]. Patient reports tenderness. No crepitus, no temperature changes.   RLE edema >left, RLE lymphedema  leg measurements- ankle 27.5, leg 42.5. Cleaned legs with soap and water, pat dry. Cover right leg with Kerlix and ACE wraps. Advised patient to wear compression socks or ACE wrpas once discharge, patient unable to teach back.   Psych: calm and cooperative       LABS/ CULTURES/ RADIOLOGY:                        8.6    3.05  )-----------( 51       ( 15 Mar 2023 02:20 )             28.2       139  |  104  |  4   ----------------------------<  97      [03-15-23 @ 02:20]  3.3   |  21  |  0.43        Ca     8.5     [03-15-23 @ 02:20]    TPro  8.4  /  Alb  3.5  /  TBili  0.6  /  DBili  x   /  AST  53  /  ALT  22  /  AlkPhos  154  [03-15-23 @ 02:20]      < from: US Duplex Venous Lower Ext Ltd, Right (03.15.23 @ 04:49) >    ACC: [id number] EXAM:  US DPLX LWR EXT VEINS LTD RT   ORDERED BY: JIMMIE DOMINGUEZ     PROCEDURE DATE:  03/15/2023      INTERPRETATION:  CLINICAL INFORMATION: Right leg wound and lower   extremity swelling    COMPARISON: Ultrasound right lower extremity 2022, 2/15/2023    TECHNIQUE: Duplex sonography of the RIGHT LOWER extremity veins with   color and spectral Doppler, with and without compression.    FINDINGS:    There is normal compressibility of the right common femoral, femoral and   popliteal veins.  The contralateral common femoral vein is patent.  Doppler examination shows normal spontaneous and phasic flow.    No calf vein thrombosis is detected. Calf edema.    IMPRESSION:  No evidence of right lower extremity deep venous thrombosis.    --- End of Report ---    < end of copied text >    ACC: [id number] EXAM:  US NONVASC EXT LTD RT   ORDERED BY: JIMMIE DOMINGUEZ     PROCEDURE DATE:  03/15/2023      INTERPRETATION:  CLINICAL INFORMATION: Right lower extremity erythema.   Concern for abscess.    TECHNIQUE: Focused sonographic evaluation of the right ankle was   performed. Grayscale and color Doppler images were acquired.    COMPARISON: No prior relevant imaging for comparison.    FINDINGS:  Subcutaneous edema and hyperemia at the area of concern along the right   ankle. No discrete collection is seen.    IMPRESSION:  Subcutaneous edema and hyperemia about the right ankle. Findings may   reflect cellulitis. No discrete collection is seen.    --- End of Report ---

## 2023-03-15 NOTE — H&P ADULT - SKIN COMMENTS
Erythematous nonhealing wound on R lateral calf, tender to palpation, no open wound or drainage, unable to visualize entire wound due to ACE bandage

## 2023-03-15 NOTE — ED ADULT NURSE NOTE - NSIMPLEMENTINTERV_GEN_ALL_ED
Implemented All Fall with Harm Risk Interventions:  [place] to call system. Call bell, personal items and telephone within reach. Instruct patient to call for assistance. Room bathroom lighting operational. Non-slip footwear when patient is off stretcher. Physically safe environment: no spills, clutter or unnecessary equipment. Stretcher in lowest position, wheels locked, appropriate side rails in place. Provide visual cue, wrist band, yellow gown, etc. Monitor gait and stability. Monitor for mental status changes and reorient to person, place, and time. Review medications for side effects contributing to fall risk. Reinforce activity limits and safety measures with patient and family. Provide visual clues: red socks.

## 2023-03-15 NOTE — H&P ADULT - HISTORY OF PRESENT ILLNESS
33 year old Urdu-speaking male, undomiciled, history of chronic EtOH abuse and withdrawal and chronic RLE wound presenting with RLE pain. Pt is a limited historian. 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 Pt was at [facility] until 2 days ago where he was given pain medications and abx. He states that the pain is the same in his leg. Reports chronic alcohol use, last drank 3 beers and "a little vodka" yesterday around noon. Does not feel like he is currently withdrawing. Bilobed Flap Text: The defect edges were debeveled with a #15 scalpel blade.  Given the location of the defect and the proximity to free margins a bilobe flap was deemed most appropriate.  Using a sterile surgical marker, an appropriate bilobe flap drawn around the defect.    The area thus outlined was incised deep to adipose tissue with a #15 scalpel blade.  The skin margins were undermined to an appropriate distance in all directions utilizing iris scissors.

## 2023-03-15 NOTE — ED PROVIDER NOTE - PHYSICAL EXAMINATION
CONSTITUTIONAL: poor hygiene and smell of etOH, otherwise NAD  HEAD: NCAT  EYES: NL inspection  ENT: dry oral mucosa   CARD: RRR  RESP: CTAB  ABD: S/NT no R/G  EXT: b/l pitting edema R>>L with area of redness over ant tib-fib appears chronic wound, LE is warmer but also significantly swollen - pt states this is his baseline swelling w/o the ACE wrap. exquisitely TTP R LE pain over ant dorsum foot  NEURO: Grossly non-focal  PSYCH: Cooperative, appropriate.

## 2023-03-15 NOTE — H&P ADULT - ASSESSMENT
33 year old Jordanian-speaking male, undomiciled, history of chronic EtOH abuse and withdrawal and chronic RLE wound presenting with RLE pain, concern for recurrent cellulitis.

## 2023-03-15 NOTE — ED ADULT NURSE NOTE - OBJECTIVE STATEMENT
Patient received in ED spot 8A. A&Ox4 and ambulatory with cane at baseline, cane at bedside. C/o right lower leg pain secondary to wound x 1 year. Pt states pain has worsened throughout this past week. Reports 8/10 pain level at this time. Right lower extremity edema and redness noted with [id number] wound noted. Malodorous scent noted. Denies CP, SOB, nausea, vomiting, headache, lightheadedness, dizziness, fever or chills. Respirations even and unlabored. No acute distress noted, pt appears laying on stretcher, HOB elevated. Bed in lowest position, wheels locked, safety maintained. Awaiting MD orders. Patient received in ED spot 8A. A&Ox4 and ambulatory with cane at baseline, cane at bedside. Primarily Yoruba speaking. C/o right lower leg pain secondary to wound x 1 year. Pt states pain has worsened throughout this past week. Reports 4/10 pain level at this time. Right lower extremity edema and redness noted with [id number] wound noted. Malodorous scent noted. Denies CP, SOB, nausea, vomiting, headache, lightheadedness, dizziness, fever or chills. Respirations even and unlabored. No acute distress noted, pt appears laying on stretcher, HOB elevated. Bed in lowest position, wheels locked, safety maintained. Awaiting MD orders. Patient received in ED spot 8A. A&Ox4 and ambulatory with cane at baseline, cane at bedside. Primarily Macedonian speaking. C/o right lower leg pain secondary to wound x 1 year. Pt states pain has worsened throughout this past week. Reports 4/10 pain level at this time. Right lower extremity edema and redness noted with [id number] wound noted. Malodorous scent noted. Denies CP, SOB, nausea, vomiting, headache, lightheadedness, dizziness, fever or chills. Respirations even and unlabored. No acute distress noted, pt appears laying on stretcher, HOB elevated. Placed on bedside cardiac monitor - NSR. Bed in lowest position, wheels locked, safety maintained. Awaiting MD orders. Patient received in ED spot 8A. A&Ox4 and ambulatory with cane at baseline, cane at bedside. Primarily Sierra Leonean speaking. C/o right lower leg pain secondary to wound x 1 year. Pt states pain has worsened throughout this past week. Reports 4/10 pain level at this time. Right lower extremity edema and redness noted with [id number] wound noted. Edema noted to left ankle. Malodorous scent noted. Distended abdomen noted. Denies CP, SOB, nausea, vomiting, headache, lightheadedness, dizziness, fever or chills. Respirations even and unlabored. No acute distress noted, pt appears laying on stretcher, HOB elevated. Placed on bedside cardiac monitor - NSR. Bed in lowest position, wheels locked, safety maintained. Awaiting MD orders.

## 2023-03-16 LAB
ALBUMIN SERPL ELPH-MCNC: 3.4 G/DL — SIGNIFICANT CHANGE UP (ref 3.3–5)
ALP SERPL-CCNC: 131 U/L — HIGH (ref 40–120)
ALT FLD-CCNC: 18 U/L — SIGNIFICANT CHANGE UP (ref 4–41)
ANION GAP SERPL CALC-SCNC: 14 MMOL/L — SIGNIFICANT CHANGE UP (ref 7–14)
AST SERPL-CCNC: 49 U/L — HIGH (ref 4–40)
BILIRUB SERPL-MCNC: 1.3 MG/DL — HIGH (ref 0.2–1.2)
BUN SERPL-MCNC: 4 MG/DL — LOW (ref 7–23)
CALCIUM SERPL-MCNC: 8.9 MG/DL — SIGNIFICANT CHANGE UP (ref 8.4–10.5)
CHLORIDE SERPL-SCNC: 97 MMOL/L — LOW (ref 98–107)
CO2 SERPL-SCNC: 21 MMOL/L — LOW (ref 22–31)
CREAT SERPL-MCNC: 0.4 MG/DL — LOW (ref 0.5–1.3)
EGFR: 149 ML/MIN/1.73M2 — SIGNIFICANT CHANGE UP
FERRITIN SERPL-MCNC: 21 NG/ML — LOW (ref 30–400)
FOLATE SERPL-MCNC: 12.8 NG/ML — SIGNIFICANT CHANGE UP (ref 3.1–17.5)
GLUCOSE SERPL-MCNC: 74 MG/DL — SIGNIFICANT CHANGE UP (ref 70–99)
HCT VFR BLD CALC: 30.9 % — LOW (ref 39–50)
HGB BLD-MCNC: 9.1 G/DL — LOW (ref 13–17)
IRON SATN MFR SERPL: 105 UG/DL — SIGNIFICANT CHANGE UP (ref 45–165)
IRON SATN MFR SERPL: 28 % — SIGNIFICANT CHANGE UP (ref 14–50)
MAGNESIUM SERPL-MCNC: 1.5 MG/DL — LOW (ref 1.6–2.6)
MCHC RBC-ENTMCNC: 23.1 PG — LOW (ref 27–34)
MCHC RBC-ENTMCNC: 29.4 GM/DL — LOW (ref 32–36)
MCV RBC AUTO: 78.4 FL — LOW (ref 80–100)
NRBC # BLD: 0 /100 WBCS — SIGNIFICANT CHANGE UP (ref 0–0)
NRBC # FLD: 0 K/UL — SIGNIFICANT CHANGE UP (ref 0–0)
PHOSPHATE SERPL-MCNC: 3.1 MG/DL — SIGNIFICANT CHANGE UP (ref 2.5–4.5)
PLATELET # BLD AUTO: 44 K/UL — LOW (ref 150–400)
POTASSIUM SERPL-MCNC: 3.2 MMOL/L — LOW (ref 3.5–5.3)
POTASSIUM SERPL-SCNC: 3.2 MMOL/L — LOW (ref 3.5–5.3)
PROT SERPL-MCNC: 8 G/DL — SIGNIFICANT CHANGE UP (ref 6–8.3)
RBC # BLD: 3.94 M/UL — LOW (ref 4.2–5.8)
RBC # FLD: 20 % — HIGH (ref 10.3–14.5)
SODIUM SERPL-SCNC: 132 MMOL/L — LOW (ref 135–145)
TIBC SERPL-MCNC: 369 UG/DL — SIGNIFICANT CHANGE UP (ref 220–430)
UIBC SERPL-MCNC: 264 UG/DL — SIGNIFICANT CHANGE UP (ref 110–370)
VIT B12 SERPL-MCNC: 793 PG/ML — SIGNIFICANT CHANGE UP (ref 200–900)
WBC # BLD: 2.72 K/UL — LOW (ref 3.8–10.5)
WBC # FLD AUTO: 2.72 K/UL — LOW (ref 3.8–10.5)

## 2023-03-16 PROCEDURE — 99233 SBSQ HOSP IP/OBS HIGH 50: CPT

## 2023-03-16 RX ADMIN — Medication 325 MILLIGRAM(S): at 12:26

## 2023-03-16 RX ADMIN — Medication 1 TABLET(S): at 05:37

## 2023-03-16 RX ADMIN — Medication 100 MILLIGRAM(S): at 05:37

## 2023-03-16 RX ADMIN — Medication 1 MILLIGRAM(S): at 05:37

## 2023-03-16 RX ADMIN — PANTOPRAZOLE SODIUM 40 MILLIGRAM(S): 20 TABLET, DELAYED RELEASE ORAL at 05:38

## 2023-03-16 RX ADMIN — Medication 1 MILLIGRAM(S): at 12:26

## 2023-03-16 RX ADMIN — Medication 100 MILLIGRAM(S): at 12:27

## 2023-03-16 RX ADMIN — Medication 1 TABLET(S): at 12:27

## 2023-03-16 NOTE — SBIRT NOTE ADULT - NSSBIRTSAVECONSULT_GEN_A_CORE
Post-Care Instructions: I reviewed with the patient in detail post-care instructions. Patient is to wear sunprotection, and avoid picking at any of the treated lesions. Pt may apply Vaseline to crusted or scabbing areas. Add 52 Modifier (Optional): no Detail Level: Detailed Number Of Freeze-Thaw Cycles: 2 freeze-thaw cycles Consent: The patient's consent was obtained including but not limited to risks of crusting, scabbing, blistering, scarring, darker or lighter pigmentary change, recurrence, incomplete removal and infection. Medical Necessity Information: It is in your best interest to select a reason for this procedure from the list below. All of these items fulfill various CMS LCD requirements except the new and changing color options. Medical Necessity Clause: This procedure was medically necessary because the lesions that were treated were: Complete

## 2023-03-16 NOTE — SBIRT NOTE ADULT - NSSBIRTDRGPOSREINDET_GEN_A_CORE
Patient admitted to using marijuana once a week or once every 2 week. He reported he is not dependent and does not need assistance to stop smoking marijuana. Patient stated he does not use any other drugs.

## 2023-03-16 NOTE — SBIRT NOTE ADULT - NSSBIRTALCPOSREINDET_GEN_A_CORE
Andria Zepeda #[id number] from [employer] used.   Patient admitted to drinking either 12-13 beers (12oz or 24oz) or 4 to 5 shots of taquila daily. Patient does not feel he needs any assistance to help assist with alcohol as alcohol usage never impacted his daily functioning such as not being able to go to work or memory loss, etc. SW discussed the standard drinking amount and that his falls in the harmful use. Patient acknowledged and agreed to accept resources in [place].

## 2023-03-17 ENCOUNTER — TRANSCRIPTION ENCOUNTER (OUTPATIENT)
Age: 33
End: 2023-03-17

## 2023-03-17 LAB
MRSA PCR RESULT.: SIGNIFICANT CHANGE UP
S AUREUS DNA NOSE QL NAA+PROBE: DETECTED

## 2023-03-17 PROCEDURE — 99232 SBSQ HOSP IP/OBS MODERATE 35: CPT

## 2023-03-17 RX ORDER — ACETAMINOPHEN 500 MG
650 TABLET ORAL ONCE
Refills: 0 | Status: COMPLETED | OUTPATIENT
Start: 2023-03-17 | End: 2023-03-17

## 2023-03-17 RX ADMIN — Medication 100 MILLIGRAM(S): at 12:03

## 2023-03-17 RX ADMIN — Medication 1 MILLIGRAM(S): at 12:02

## 2023-03-17 RX ADMIN — Medication 325 MILLIGRAM(S): at 12:03

## 2023-03-17 RX ADMIN — Medication 650 MILLIGRAM(S): at 17:12

## 2023-03-17 RX ADMIN — PANTOPRAZOLE SODIUM 40 MILLIGRAM(S): 20 TABLET, DELAYED RELEASE ORAL at 05:33

## 2023-03-17 RX ADMIN — Medication 1 TABLET(S): at 12:02

## 2023-03-17 NOTE — DISCHARGE NOTE PROVIDER - CARE PROVIDERS DIRECT ADDRESSES
[email or web address]

## 2023-03-17 NOTE — DISCHARGE NOTE PROVIDER - NSDCCPCAREPLAN_GEN_ALL_CORE_FT
PRINCIPAL DISCHARGE DIAGNOSIS  Diagnosis: Inability to ambulate due to ankle or foot  Assessment and Plan of Treatment:       SECONDARY DISCHARGE DIAGNOSES  Diagnosis: Cellulitis  Assessment and Plan of Treatment: -Topical Recommendations: Clean bilateral legs with soap and water.  Pat dry.  Apply Sween 24 moisturizer to bilateral legs, avoid between toes. Cover with Kerlix wrap. Compress RLE with ACE wraps. Change daily.   -Once discharge, patient should f/u with wound care outpatient for ongoing management of lymphedema/edema to RLE        PRINCIPAL DISCHARGE DIAGNOSIS  Diagnosis: Inability to ambulate due to ankle or foot  Assessment and Plan of Treatment: please ambulate as tolerated. continue to address your leg wound as instructed. follow up with wound care physicians.      SECONDARY DISCHARGE DIAGNOSES  Diagnosis: Cellulitis  Assessment and Plan of Treatment: -Topical Recommendations: Clean bilateral legs with soap and water.  Pat dry.  Apply Sween 24 moisturizer to bilateral legs, avoid between toes. Cover with Kerlix wrap. Compress RLE with ACE wraps. Change daily.   -Once discharge, patient should f/u with wound care outpatient for ongoing management of lymphedema/edema to RLE        PRINCIPAL DISCHARGE DIAGNOSIS  Diagnosis: Inability to ambulate due to ankle or foot  Assessment and Plan of Treatment: Please ambulate as tolerated. Continue to address your leg wound as instructed. Follow up with wound care physicians.      SECONDARY DISCHARGE DIAGNOSES  Diagnosis: Cellulitis  Assessment and Plan of Treatment: Wounds not infected, antibiotics stopped.  Topical Recommendations: Clean bilateral legs with soap and water.  Pat dry.  Apply Sween 24 moisturizer to bilateral legs, avoid between toes. Cover with Kerlix wrap. Compress RLE with ACE wraps. Change daily.   -you should follow up with wound care clinic outpatient for ongoing management of lymphedema/edema to right leg      Diagnosis: Alcohol abuse  Assessment and Plan of Treatment: In order to optimize your overall health and prevent adverse events, please abstain from ingesting alcohol. Continue to supplement with recommended vitamins and follow-up with your primary care provider for further medical care. It is highly recommended to attend AA meetings to help create a sober lifestyle. If you need immediate assistance with substance abuse you may contact the [facility] by calling [phone].  [facility]: [phone]. [facility] -[phone]    Diagnosis: Acute diarrhea  Assessment and Plan of Treatment: Stool cultures negative, diarrhea resolved

## 2023-03-17 NOTE — PHYSICAL THERAPY INITIAL EVALUATION ADULT - GENERAL OBSERVATIONS, REHAB EVAL
Pt found in bed; +right ankle with dressing and ace wrap; +IV; Mexican speaking  used Trudi #[id number].

## 2023-03-17 NOTE — DISCHARGE NOTE PROVIDER - ATTENDING DISCHARGE PHYSICAL EXAMINATION:
GENERAL: no apparent distress  HEAD:  Atraumatic, Normocephalic  EYES: EOMI, PERRLA, conjunctiva and sclera clear b/l  CHEST/LUNG: on RA; Clear to auscultation bilaterally; No wheezing; No crackles  HEART: Regular rate and rhythm; S1/S2 wnl; no obvious murmurs  ABDOMEN: Soft, Nontender, Nondistended; Bowel sounds present  EXTREMITIES:  2+ Peripheral Pulses, No edema; RLE wrapped in ace bandages. no significant pain on palpation.   PSYCH: normal affect, calm demeanor  NEUROLOGY: AAOX3, CN 2-12 grossly intact, no obvious FND  pt seen and examined by me  eating breakfast  offers no complaints  VSS  CHEST b/l AE  EXT RLE with dressing c/d/i  a/w homelessness, chronic RLE wound, not infected

## 2023-03-17 NOTE — DISCHARGE NOTE PROVIDER - NSDCFUADDAPPT_GEN_ALL_CORE_FT
Upon discharge f/u as outpatient at [facility] [address] [place] [address] [phone] or Wound Center at [facility], [address] [facility] ([phone]).    Upon discharge follow up as outpatient at [facility] [address] [place] [address] [phone] or Wound Center at [facility], [address], [place] ([phone])

## 2023-03-17 NOTE — DISCHARGE NOTE PROVIDER - CARE PROVIDER_API CALL
Darlene Dill)  Surgery  [address] [facility], [address]nd [place] [address]  Phone: [phone]  Fax: [phone]  Follow Up Time:     Kade Hammer)  Internal Medicine  [address] [place] [address]  Phone: [phone]  Fax: [phone]  Follow Up Time:

## 2023-03-17 NOTE — PHYSICAL THERAPY INITIAL EVALUATION ADULT - MANUAL MUSCLE TESTING RESULTS, REHAB EVAL
Patient discharged home per provider's order. Patient given discharge instructions, follow-up information and instructed to return to ED if symptoms worsen.  Answered patient's questions.  Patient verbalized understanding of discharge teaching.  Patient then discharged from ED.    bilateral Upper Extremity and Lower Extremity 4+/5 except right ankle df/pf 3-/5 within available range

## 2023-03-17 NOTE — PHYSICAL THERAPY INITIAL EVALUATION ADULT - PLANNED THERAPY INTERVENTIONS, PT EVAL
Patient left positioned for safety in bed in NAD, call bell in reach, all lines intact./bed mobility training/gait training/strengthening/transfer training

## 2023-03-17 NOTE — PHYSICAL THERAPY INITIAL EVALUATION ADULT - ACTIVE RANGE OF MOTION EXAMINATION, REHAB EVAL
right ankle df/pf 0-5 degrees/bilateral upper extremity Active ROM was WFL (within functional limits)/bilateral  lower extremity Active ROM was WFL (within functional limits)

## 2023-03-17 NOTE — DISCHARGE NOTE PROVIDER - NSDCMRMEDTOKEN_GEN_ALL_CORE_FT
ferrous sulfate 325 mg (65 mg elemental iron) oral tablet: 1 tab(s) orally 3 times a week every Mon/Wed/Fri  folic acid 1 mg oral tablet: 1 tab(s) orally once a day  Multiple Vitamins oral tablet: 1 tab(s) orally once a day  pantoprazole 40 mg oral delayed release tablet: 1 tab(s) orally once a day (before a meal)  thiamine 100 mg oral tablet: 1 tab(s) orally once a day   ferrous sulfate 325 mg (65 mg elemental iron) oral tablet: 1 tab(s) orally 3 times a week every Mon/Wed/Fri  folic acid 1 mg oral tablet: 1 tab(s) orally once a day  Multiple Vitamins oral tablet: 1 tab(s) orally once a day  mupirocin 2% topical ointment: 1 application topically 2 times a day  pantoprazole 40 mg oral delayed release tablet: 1 tab(s) orally once a day (before a meal)  thiamine 100 mg oral tablet: 1 tab(s) orally once a day

## 2023-03-17 NOTE — DISCHARGE NOTE PROVIDER - HOSPITAL COURSE
33 year old Sierra Leonean-speaking male, undomiciled, history of chronic EtOH abuse and withdrawal and chronic RLE wound presenting with RLE pain, concern for recurrent cellulitis. 33 year old Kazakh-speaking male, undomiciled, history of chronic EtOH abuse and withdrawal and chronic RLE wound presenting with RLE pain, concern for recurrent cellulitis. Patient was evaluated by wound care and there was no concern for active infection in the RLE. he was managed with topical wound care. Abx was stopped. patient stable off abx. d/c to shelter. 33 year old Hungarian-speaking male, undomiciled, history of chronic EtOH abuse and withdrawal and chronic RLE wound presenting with RLE pain, concern for recurrent cellulitis. Patient was evaluated by wound care and there was no concern for active infection in the RLE. he was managed with topical wound care. Abx was stopped. patient stable off abx. He was monitored for 72hrs on CIWA w/o signs of alcohol withdrawal. d/c to shelter planning.

## 2023-03-17 NOTE — PHYSICAL THERAPY INITIAL EVALUATION ADULT - PERTINENT HX OF CURRENT PROBLEM, REHAB EVAL
33 year old Hebrew-speaking male, undomiciled, history of chronic EtOH abuse and withdrawal and chronic right Lower Extremity wound presenting with right Lower Extremity pain. Pt is a limited historian. 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 Pt was at [facility] until 2 days ago where he was given pain medications and abx. He states that the pain is the same in his leg. Reports chronic alcohol use, last drank 3 beers and "a little vodka" yesterday around noon. Right Lower Extremity US negative DVT.

## 2023-03-18 DIAGNOSIS — R19.7 DIARRHEA, UNSPECIFIED: ICD-10-CM

## 2023-03-18 LAB
ANION GAP SERPL CALC-SCNC: 8 MMOL/L — SIGNIFICANT CHANGE UP (ref 7–14)
BUN SERPL-MCNC: 9 MG/DL — SIGNIFICANT CHANGE UP (ref 7–23)
C DIFF BY PCR RESULT: SIGNIFICANT CHANGE UP
CALCIUM SERPL-MCNC: 8.6 MG/DL — SIGNIFICANT CHANGE UP (ref 8.4–10.5)
CHLORIDE SERPL-SCNC: 106 MMOL/L — SIGNIFICANT CHANGE UP (ref 98–107)
CO2 SERPL-SCNC: 21 MMOL/L — LOW (ref 22–31)
CREAT SERPL-MCNC: 0.48 MG/DL — LOW (ref 0.5–1.3)
EGFR: 141 ML/MIN/1.73M2 — SIGNIFICANT CHANGE UP
GLUCOSE SERPL-MCNC: 102 MG/DL — HIGH (ref 70–99)
MAGNESIUM SERPL-MCNC: 1.6 MG/DL — SIGNIFICANT CHANGE UP (ref 1.6–2.6)
PHOSPHATE SERPL-MCNC: 4.2 MG/DL — SIGNIFICANT CHANGE UP (ref 2.5–4.5)
POTASSIUM SERPL-MCNC: 4 MMOL/L — SIGNIFICANT CHANGE UP (ref 3.5–5.3)
POTASSIUM SERPL-SCNC: 4 MMOL/L — SIGNIFICANT CHANGE UP (ref 3.5–5.3)
SODIUM SERPL-SCNC: 135 MMOL/L — SIGNIFICANT CHANGE UP (ref 135–145)

## 2023-03-18 PROCEDURE — 99233 SBSQ HOSP IP/OBS HIGH 50: CPT

## 2023-03-18 RX ADMIN — PANTOPRAZOLE SODIUM 40 MILLIGRAM(S): 20 TABLET, DELAYED RELEASE ORAL at 05:09

## 2023-03-18 RX ADMIN — Medication 1 TABLET(S): at 12:55

## 2023-03-18 RX ADMIN — Medication 325 MILLIGRAM(S): at 12:55

## 2023-03-18 RX ADMIN — Medication 1 MILLIGRAM(S): at 12:55

## 2023-03-18 RX ADMIN — Medication 100 MILLIGRAM(S): at 12:55

## 2023-03-19 LAB
ANION GAP SERPL CALC-SCNC: 11 MMOL/L — SIGNIFICANT CHANGE UP (ref 7–14)
BUN SERPL-MCNC: 8 MG/DL — SIGNIFICANT CHANGE UP (ref 7–23)
CALCIUM SERPL-MCNC: 8.5 MG/DL — SIGNIFICANT CHANGE UP (ref 8.4–10.5)
CHLORIDE SERPL-SCNC: 105 MMOL/L — SIGNIFICANT CHANGE UP (ref 98–107)
CO2 SERPL-SCNC: 20 MMOL/L — LOW (ref 22–31)
CREAT SERPL-MCNC: 0.49 MG/DL — LOW (ref 0.5–1.3)
CULTURE RESULTS: SIGNIFICANT CHANGE UP
EGFR: 140 ML/MIN/1.73M2 — SIGNIFICANT CHANGE UP
GLUCOSE SERPL-MCNC: 102 MG/DL — HIGH (ref 70–99)
HCT VFR BLD CALC: 28.7 % — LOW (ref 39–50)
HGB BLD-MCNC: 8.5 G/DL — LOW (ref 13–17)
MAGNESIUM SERPL-MCNC: 1.8 MG/DL — SIGNIFICANT CHANGE UP (ref 1.6–2.6)
MCHC RBC-ENTMCNC: 23.2 PG — LOW (ref 27–34)
MCHC RBC-ENTMCNC: 29.6 GM/DL — LOW (ref 32–36)
MCV RBC AUTO: 78.4 FL — LOW (ref 80–100)
NRBC # BLD: 0 /100 WBCS — SIGNIFICANT CHANGE UP (ref 0–0)
NRBC # FLD: 0 K/UL — SIGNIFICANT CHANGE UP (ref 0–0)
PHOSPHATE SERPL-MCNC: 3.5 MG/DL — SIGNIFICANT CHANGE UP (ref 2.5–4.5)
PLATELET # BLD AUTO: 79 K/UL — LOW (ref 150–400)
POTASSIUM SERPL-MCNC: 3 MMOL/L — LOW (ref 3.5–5.3)
POTASSIUM SERPL-SCNC: 3 MMOL/L — LOW (ref 3.5–5.3)
RBC # BLD: 3.66 M/UL — LOW (ref 4.2–5.8)
RBC # FLD: 21 % — HIGH (ref 10.3–14.5)
SODIUM SERPL-SCNC: 136 MMOL/L — SIGNIFICANT CHANGE UP (ref 135–145)
SPECIMEN SOURCE: SIGNIFICANT CHANGE UP
WBC # BLD: 3.84 K/UL — SIGNIFICANT CHANGE UP (ref 3.8–10.5)
WBC # FLD AUTO: 3.84 K/UL — SIGNIFICANT CHANGE UP (ref 3.8–10.5)

## 2023-03-19 PROCEDURE — 99233 SBSQ HOSP IP/OBS HIGH 50: CPT

## 2023-03-19 RX ORDER — ACETAMINOPHEN 500 MG
650 TABLET ORAL ONCE
Refills: 0 | Status: COMPLETED | OUTPATIENT
Start: 2023-03-19 | End: 2023-03-19

## 2023-03-19 RX ORDER — POTASSIUM CHLORIDE 20 MEQ
40 PACKET (EA) ORAL EVERY 4 HOURS
Refills: 0 | Status: COMPLETED | OUTPATIENT
Start: 2023-03-19 | End: 2023-03-19

## 2023-03-19 RX ORDER — MUPIROCIN 20 MG/G
1 OINTMENT TOPICAL
Refills: 0 | Status: DISCONTINUED | OUTPATIENT
Start: 2023-03-19 | End: 2023-03-20

## 2023-03-19 RX ADMIN — Medication 650 MILLIGRAM(S): at 00:57

## 2023-03-19 RX ADMIN — Medication 1 TABLET(S): at 11:35

## 2023-03-19 RX ADMIN — PANTOPRAZOLE SODIUM 40 MILLIGRAM(S): 20 TABLET, DELAYED RELEASE ORAL at 06:14

## 2023-03-19 RX ADMIN — Medication 100 MILLIGRAM(S): at 11:35

## 2023-03-19 RX ADMIN — Medication 40 MILLIEQUIVALENT(S): at 23:57

## 2023-03-19 RX ADMIN — MUPIROCIN 1 APPLICATION(S): 20 OINTMENT TOPICAL at 17:57

## 2023-03-19 RX ADMIN — Medication 325 MILLIGRAM(S): at 11:36

## 2023-03-19 RX ADMIN — Medication 650 MILLIGRAM(S): at 00:27

## 2023-03-19 RX ADMIN — Medication 1 MILLIGRAM(S): at 11:35

## 2023-03-19 RX ADMIN — Medication 40 MILLIEQUIVALENT(S): at 17:57

## 2023-03-19 NOTE — PROGRESS NOTE ADULT - SUBJECTIVE AND OBJECTIVE BOX
[facility] Division of Hospital Medicine  Parker Oquendo MD  In House Pager [phone]    Patient is a 33y old  Male who presents with a chief complaint of RLE pain (17 Mar 2023 13:50)    SUBJECTIVE / OVERNIGHT EVENTS: no acute events. reports RLE pain, slightly worsen.     ROS: Denied Fever, Chill, CP, SOB, Abd pain, N/V/D, LE swelling    MEDICATIONS  (STANDING):  ferrous    sulfate 325 milliGRAM(s) Oral daily  folic acid 1 milliGRAM(s) Oral daily  influenza   Vaccine 0.5 milliLiter(s) IntraMuscular once  multivitamin 1 Tablet(s) Oral daily  pantoprazole    Tablet 40 milliGRAM(s) Oral before breakfast  thiamine 100 milliGRAM(s) Oral daily    MEDICATIONS  (PRN):  LORazepam     Tablet 2 milliGRAM(s) Oral every 2 hours PRN CIWA-Ar score increase by 2 points and a total score of 7 or less  LORazepam     Tablet 2 milliGRAM(s) Oral every 1 hour PRN CIWA-Ar score 8 or greater    CAPILLARY BLOOD GLUCOSE        I&O's Summary    PHYSICAL EXAM:  Vital Signs Last 24 Hrs  T(C): 37 (18 Mar 2023 08:00), Max: 37.2 (17 Mar 2023 16:00)  T(F): 98.6 (18 Mar 2023 08:00), Max: 98.9 (17 Mar 2023 16:00)  HR: 86 (18 Mar 2023 08:00) (86 - 98)  BP: 112/59 (18 Mar 2023 08:00) (108/52 - 119/63)  BP(mean): --  RR: 16 (18 Mar 2023 08:00) (16 - 18)  SpO2: 99% (18 Mar 2023 08:00) (96% - 99%)    Parameters below as of 18 Mar 2023 08:00  Patient On (Oxygen Delivery Method): room air      Gen: NAD; sitting in bed comfortably   Pulm: no accessory muscle use; lungs clear on auscultation bilaterally; no wheezing or crackles.   Cards: RRR, nl S1/S2; no LE edema; no JVD  Abd: non-distended; soft and NT on exam; +bs  Ext: CARLA; no joint effusion; RLE ttp in calf.   Neuro: Awake and Alert; non-focal; moving all extremities.   Skin: no new rashes; warm to touch;     LABS:                    Culture - Blood (collected 16 Mar 2023 18:40)  Source: .Blood Blood-Venous  Preliminary Report (18 Mar 2023 03:01):    No growth to date.    Culture - Blood (collected 16 Mar 2023 18:40)  Source: .Blood Blood  Preliminary Report (18 Mar 2023 03:01):    No growth to date.      RADIOLOGY & ADDITIONAL TESTS:  Results Reviewed: Y  Imaging Personally Reviewed: Y  Electrocardiogram Personally Reviewed: Y    COORDINATION OF CARE:  Care Discussed with Consultants/Other Providers [Y/N]: Y  Prior or Outpatient Records Reviewed [Y/N]: Y  
[facility] Division of Hospital Medicine  Parker Oquendo MD  In House Pager [phone]    Patient is a 33y old  Male who presents with a chief complaint of RLE pain (17 Mar 2023 09:40)    SUBJECTIVE / OVERNIGHT EVENTS: no acute events. patient has no complaints. no significant pain in his RLE.     ROS: Denied Fever, Chill, CP, SOB, Abd pain, N/V/D    MEDICATIONS  (STANDING):  ferrous    sulfate 325 milliGRAM(s) Oral daily  folic acid 1 milliGRAM(s) Oral daily  influenza   Vaccine 0.5 milliLiter(s) IntraMuscular once  multivitamin 1 Tablet(s) Oral daily  pantoprazole    Tablet 40 milliGRAM(s) Oral before breakfast  thiamine 100 milliGRAM(s) Oral daily    MEDICATIONS  (PRN):  LORazepam     Tablet 2 milliGRAM(s) Oral every 2 hours PRN CIWA-Ar score increase by 2 points and a total score of 7 or less  LORazepam     Tablet 2 milliGRAM(s) Oral every 1 hour PRN CIWA-Ar score 8 or greater    CAPILLARY BLOOD GLUCOSE        I&O's Summary    PHYSICAL EXAM:  Vital Signs Last 24 Hrs  T(C): 36.9 (17 Mar 2023 12:00), Max: 37.5 (16 Mar 2023 20:52)  T(F): 98.4 (17 Mar 2023 12:00), Max: 99.5 (16 Mar 2023 20:52)  HR: 89 (17 Mar 2023 12:00) (72 - 94)  BP: 115/66 (17 Mar 2023 12:00) (106/52 - 132/74)  BP(mean): --  RR: 18 (17 Mar 2023 12:00) (16 - 18)  SpO2: 98% (17 Mar 2023 12:00) (96% - 99%)    Parameters below as of 17 Mar 2023 12:00  Patient On (Oxygen Delivery Method): room air      Gen: NAD; sitting in bed comfortably   Pulm: no accessory muscle use; lungs clear on auscultation bilaterally; no wheezing or crackles.   Cards: RRR, nl S1/S2; no LE edema; no JVD  Abd: non-distended; soft and NT on exam; +bs  Ext: CARLA; RLE wrap in ace bandage no significant tenderness on palpation.   Neuro: Awake and Alert; non-focal; moving all extremities.   Skin: no new rashes; warm to touch;     LABS:                        9.1    2.72  )-----------( 44       ( 16 Mar 2023 06:15 )             30.9     03-16    132<L>  |  97<L>  |  4<L>  ----------------------------<  74  3.2<L>   |  21<L>  |  0.40<L>    Ca    8.9      16 Mar 2023 06:15  Phos  3.1     03-16  Mg     1.50     03-16    TPro  8.0  /  Alb  3.4  /  TBili  1.3<H>  /  DBili  x   /  AST  49<H>  /  ALT  18  /  AlkPhos  131<H>  03-16              RADIOLOGY & ADDITIONAL TESTS:  Results Reviewed: Y  Imaging Personally Reviewed: Y  Electrocardiogram Personally Reviewed: Y    COORDINATION OF CARE:  Care Discussed with Consultants/Other Providers [Y/N]: Y  Prior or Outpatient Records Reviewed [Y/N]: ELIDIA  
[facility] Division of Hospital Medicine  Parker Oquendo MD  In House Pager [phone]    Patient is a 33y old  Male who presents with a chief complaint of RLE pain (15 Mar 2023 12:00)    SUBJECTIVE / OVERNIGHT EVENTS: no acute events. patient has reports some pain in his RLE with movement at the knee.     ROS: Denied Fever, Chill, CP, SOB, Abd pain, N/V/D.    MEDICATIONS  (STANDING):  ceFAZolin   IVPB 2000 milliGRAM(s) IV Intermittent every 8 hours  ceFAZolin   IVPB      ferrous    sulfate 325 milliGRAM(s) Oral daily  folic acid 1 milliGRAM(s) Oral daily  influenza   Vaccine 0.5 milliLiter(s) IntraMuscular once  multivitamin 1 Tablet(s) Oral daily  pantoprazole    Tablet 40 milliGRAM(s) Oral before breakfast  thiamine 100 milliGRAM(s) Oral daily    MEDICATIONS  (PRN):  LORazepam     Tablet 2 milliGRAM(s) Oral every 2 hours PRN CIWA-Ar score increase by 2 points and a total score of 7 or less  LORazepam     Tablet 2 milliGRAM(s) Oral every 1 hour PRN CIWA-Ar score 8 or greater    CAPILLARY BLOOD GLUCOSE        I&O's Summary    PHYSICAL EXAM:  Vital Signs Last 24 Hrs  T(C): 36.6 (16 Mar 2023 10:00), Max: 37.5 (16 Mar 2023 02:11)  T(F): 97.8 (16 Mar 2023 10:00), Max: 99.5 (16 Mar 2023 02:11)  HR: 72 (16 Mar 2023 10:00) (72 - 105)  BP: 114/67 (16 Mar 2023 10:00) (114/67 - 139/87)  BP(mean): --  RR: 18 (16 Mar 2023 10:00) (15 - 20)  SpO2: 100% (16 Mar 2023 10:00) (96% - 100%)    Parameters below as of 16 Mar 2023 10:00  Patient On (Oxygen Delivery Method): room air      Gen: NAD; sitting in bed comfortably   Pulm: no accessory muscle use; lungs clear on auscultation bilaterally; no wheezing or crackles.   Cards: RRR, nl S1/S2; no LE edema; no JVD  Abd: non-distended; soft and NT on exam; +bs  Ext: LLE nl. RLE uriel; pain with bending of the knee.   Neuro: Awake and Alert; non-focal; moving all extremities.   Skin: no new rashes; warm to touch;     LABS:                        9.1    2.72  )-----------( 44       ( 16 Mar 2023 06:15 )             30.9     03-16    132<L>  |  97<L>  |  4<L>  ----------------------------<  74  3.2<L>   |  21<L>  |  0.40<L>    Ca    8.9      16 Mar 2023 06:15  Phos  3.1     03-16  Mg     1.50     03-16    TPro  8.0  /  Alb  3.4  /  TBili  1.3<H>  /  DBili  x   /  AST  49<H>  /  ALT  18  /  AlkPhos  131<H>  03-16              RADIOLOGY & ADDITIONAL TESTS:  Results Reviewed: Y  Imaging Personally Reviewed: Y  Electrocardiogram Personally Reviewed: Y    COORDINATION OF CARE:  Care Discussed with Consultants/Other Providers [Y/N]: Y  Prior or Outpatient Records Reviewed [Y/N]: Y  
[facility] Division of Hospital Medicine  Parker Oquendo MD  In House Pager [phone]    Patient is a 33y old  Male who presents with a chief complaint of RLE pain (18 Mar 2023 14:07)    SUBJECTIVE / OVERNIGHT EVENTS: no acute events. C.diff neg. patient has no acute complaints.     ROS: Denied Fever, Chill, CP, SOB, Abd pain, N/V/D, LE swelling or pain.     MEDICATIONS  (STANDING):  ferrous    sulfate 325 milliGRAM(s) Oral daily  folic acid 1 milliGRAM(s) Oral daily  influenza   Vaccine 0.5 milliLiter(s) IntraMuscular once  multivitamin 1 Tablet(s) Oral daily  pantoprazole    Tablet 40 milliGRAM(s) Oral before breakfast  thiamine 100 milliGRAM(s) Oral daily    MEDICATIONS  (PRN):    CAPILLARY BLOOD GLUCOSE        I&O's Summary    PHYSICAL EXAM:  Vital Signs Last 24 Hrs  T(C): 36.7 (19 Mar 2023 05:21), Max: 37 (19 Mar 2023 00:10)  T(F): 98.1 (19 Mar 2023 05:21), Max: 98.6 (19 Mar 2023 00:10)  HR: 80 (19 Mar 2023 05:21) (80 - 94)  BP: 104/64 (19 Mar 2023 05:21) (104/64 - 123/65)  BP(mean): --  RR: 18 (19 Mar 2023 05:21) (16 - 18)  SpO2: 98% (19 Mar 2023 05:21) (97% - 100%)    Parameters below as of 19 Mar 2023 05:21  Patient On (Oxygen Delivery Method): room air      Gen: NAD; sitting in bed comfortably   Pulm: no accessory muscle use; lungs clear on auscultation bilaterally; no wheezing or crackles.   Cards: RRR, nl S1/S2; no LE edema; no JVD  Abd: non-distended; soft and NT on exam; +bs  Ext: CARLA; no joint effusion; tender in RLE on palpation. localized to the right calf and shin.   Neuro: Awake and Alert; non-focal; moving all extremities.   Skin: no new rashes; warm to touch;     LABS:                        8.5    3.84  )-----------( 79       ( 19 Mar 2023 06:30 )             28.7     03-19    136  |  105  |  8   ----------------------------<  102<H>  3.0<L>   |  20<L>  |  0.49<L>    Ca    8.5      19 Mar 2023 06:30  Phos  3.5     03-19  Mg     1.80     03-19                Culture - Stool (collected 17 Mar 2023 16:43)  Source: .Stool Feces  Preliminary Report (18 Mar 2023 19:20):    No enteric pathogens to date: Final culture pending    Culture - Blood (collected 16 Mar 2023 18:40)  Source: .Blood Blood-Venous  Preliminary Report (18 Mar 2023 03:01):    No growth to date.    Culture - Blood (collected 16 Mar 2023 18:40)  Source: .Blood Blood  Preliminary Report (18 Mar 2023 03:01):    No growth to date.      RADIOLOGY & ADDITIONAL TESTS:  Results Reviewed: Y  Imaging Personally Reviewed: Y  Electrocardiogram Personally Reviewed: Y    COORDINATION OF CARE:  Care Discussed with Consultants/Other Providers [Y/N]: Y  Prior or Outpatient Records Reviewed [Y/N]: Y

## 2023-03-19 NOTE — PROGRESS NOTE ADULT - PROBLEM SELECTOR PLAN 4
DVT ppx: SCDs given thrombocytopenia  Diet: Regular  Dispo: Pt is currently homeless, will need SW eval for possible shelter placement
DVT ppx: SCDs given thrombocytopenia  Diet: Regular  Dispo: Pt is currently homeless, will need SW eval for possible shelter placement
Suspect in setting of chronic EtOH use. Usually improves on prior admissions with abstinence.  -continue to trend CBC with diff  -hematology eval if not improving  - monitor counts.
Suspect in setting of chronic EtOH use. Usually improves on prior admissions with abstinence.  -continue to trend CBC with diff  -hematology eval if not improving  - monitor counts.

## 2023-03-19 NOTE — PROGRESS NOTE ADULT - PROBLEM SELECTOR PLAN 3
Patient with daily alcohol intake, last on 3/14. Currently not in withdrawal  -monitor on CIWA with symptom-triggered Ativan  -c/w MV, folic acid, thiamine  -elevated liver enzymes likely in setting of recent EtOH use- continue to trend  -mild hypokalemia to 3.3 noted, received 40mEq repletion in the ER, continue to trend  -SBIRT consult  - currently w/o signs of withdrawal.
Patient with daily alcohol intake, last on 3/14. Currently not in withdrawal  -monitor on CIWA with symptom-triggered Ativan  -c/w MV, folic acid, thiamine  -elevated liver enzymes likely in setting of recent EtOH use- continue to trend  -mild hypokalemia to 3.3 noted, received 40mEq repletion in the ER, continue to trend  -SBIRT consult  - s/p 72hr monitor, no prn needs. d/c CIWA protocol.
Suspect in setting of chronic EtOH use. Usually improves on prior admissions with abstinence.  -continue to trend CBC with diff  -hematology eval if not improving  - monitor counts.
Suspect in setting of chronic EtOH use. Usually improves on prior admissions with abstinence.  -continue to trend CBC with diff  -hematology eval if not improving  - monitor counts.

## 2023-03-19 NOTE — PROGRESS NOTE ADULT - PROBLEM SELECTOR PLAN 5
DVT ppx: SCDs given thrombocytopenia  Diet: Regular  Dispo: Pt is currently homeless, will need SW eval for possible shelter placement
DVT ppx: SCDs given thrombocytopenia  Diet: Regular  Dispo: Pt is currently homeless, will need SW eval for possible shelter placement

## 2023-03-19 NOTE — PROGRESS NOTE ADULT - ASSESSMENT
33 year old Russian-speaking male, undomiciled, history of chronic EtOH abuse and withdrawal and chronic RLE wound presenting with RLE pain, concern for recurrent cellulitis.
33 year old Costa Rican-speaking male, undomiciled, history of chronic EtOH abuse and withdrawal and chronic RLE wound presenting with RLE pain, concern for recurrent cellulitis.
33 year old Guinean-speaking male, undomiciled, history of chronic EtOH abuse and withdrawal and chronic RLE wound presenting with RLE pain, concern for recurrent cellulitis.
33 year old Finnish-speaking male, undomiciled, history of chronic EtOH abuse and withdrawal and chronic RLE wound presenting with RLE pain, concern for recurrent cellulitis.

## 2023-03-19 NOTE — PROGRESS NOTE ADULT - PROBLEM SELECTOR PLAN 2
Patient with daily alcohol intake, last on 3/14. Currently not in withdrawal  -monitor on CIWA with symptom-triggered Ativan  -c/w MV, folic acid, thiamine  -elevated liver enzymes likely in setting of recent EtOH use- continue to trend  -mild hypokalemia to 3.3 noted, received 40mEq repletion in the ER, continue to trend  -SBIRT consult  - currently w/o signs of withdrawal.
RLE nonhealing wound which is erythematous and tender to touch, elevated ESR for pt's age. Unclear if this is true cellulitis, pt seen by wound care team in February 2023 with no concern for cellulitis at that time.  - evaluated by would care, low suspicion for cellulitis.   - d/c abx.   - ctm clinically.
Patient with daily alcohol intake, last on 3/14. Currently not in withdrawal  -monitor on CIWA with symptom-triggered Ativan  -c/w MV, folic acid, thiamine  -elevated liver enzymes likely in setting of recent EtOH use- continue to trend  -mild hypokalemia to 3.3 noted, received 40mEq repletion in the ER, continue to trend  -SBIRT consult  - currently w/o signs of withdrawal.
RLE nonhealing wound which is erythematous and tender to touch, elevated ESR for pt's age. Unclear if this is true cellulitis, pt seen by wound care team in February 2023 with no concern for cellulitis at that time.  - evaluated by would care, low suspicion for cellulitis.   - d/c abx.   - ctm clinically.  - RLE doppler done on admission, neg for DVT.

## 2023-03-19 NOTE — PROGRESS NOTE ADULT - TIME BILLING
reviewing laboratory data, consultants' recommendations, documentation in [place], performing medically appropriate examinations/evaluations, discussion with patient/family/RN/ACP/Residents and interdisciplinary staff (such as , social workers, etc), counseling patient/family/care giver, ordering medically appropriate medication, tests, or procedures. Interventions were performed as documented above.
reviewing laboratory data, consultants' recommendations, documentation in [place], performing medically appropriate examinations/evaluations, discussion with patient/family/RN/ACP/Residents and interdisciplinary staff (such as , social workers, etc), counseling patient/family/care giver, ordering medically appropriate medication, tests, or procedures. Interventions were performed as documented above.
reviewing laboratory data, consultants' recommendations, documentation in [place], performing medically appropriate examinations/evaluations, discussion with patient/family/RN/ACP/Residents and interdisciplinary staff (such as , social workers, etc), counseling patient/family/care giver, ordering medically appropriate medication, tests, or procedures. Interventions were performed as documented above.
reviewing laboratory data, consultants' recommendations, documentation in [place], performing medically appropriate examinations/evaluations, discussion with patient/family/RN/ACP/Residents and interdisciplinary staff (such as , social workers, etc), counseling patient/family/care giver, ordering medically appropriate medication, tests, or procedures. Interventions were performed as documented above.

## 2023-03-19 NOTE — PROGRESS NOTE ADULT - PROBLEM SELECTOR PLAN 1
C.diff neg.   monitor stool count.   possible secondary to recent abx use.   f/u stool culture
RLE nonhealing wound which is erythematous and tender to touch, elevated ESR for pt's age. Unclear if this is true cellulitis, pt seen by wound care team in February 2023 with no concern for cellulitis at that time.  - evaluated by would care, low suspicion for cellulitis.   - d/c abx.   - ctm clinically.
RLE nonhealing wound which is erythematous and tender to touch, elevated ESR for pt's age. Unclear if this is true cellulitis, pt seen by wound care team in February 2023 with no concern for cellulitis at that time.  - evaluated by would care, low suspicion for cellulitis.   - d/c abx.   - ctm clinically.
C.diff neg.   monitor stool count.   possible secondary to recent abx use.   f/u stool culture

## 2023-03-20 ENCOUNTER — TRANSCRIPTION ENCOUNTER (OUTPATIENT)
Age: 33
End: 2023-03-20

## 2023-03-20 VITALS
HEART RATE: 70 BPM | OXYGEN SATURATION: 98 % | RESPIRATION RATE: 18 BRPM | TEMPERATURE: 99 F | SYSTOLIC BLOOD PRESSURE: 114 MMHG | DIASTOLIC BLOOD PRESSURE: 63 MMHG

## 2023-03-20 LAB
ANION GAP SERPL CALC-SCNC: 11 MMOL/L — SIGNIFICANT CHANGE UP (ref 7–14)
BUN SERPL-MCNC: 7 MG/DL — SIGNIFICANT CHANGE UP (ref 7–23)
CALCIUM SERPL-MCNC: 8.9 MG/DL — SIGNIFICANT CHANGE UP (ref 8.4–10.5)
CHLORIDE SERPL-SCNC: 106 MMOL/L — SIGNIFICANT CHANGE UP (ref 98–107)
CO2 SERPL-SCNC: 22 MMOL/L — SIGNIFICANT CHANGE UP (ref 22–31)
CREAT SERPL-MCNC: 0.43 MG/DL — LOW (ref 0.5–1.3)
EGFR: 145 ML/MIN/1.73M2 — SIGNIFICANT CHANGE UP
GLUCOSE SERPL-MCNC: 86 MG/DL — SIGNIFICANT CHANGE UP (ref 70–99)
HCT VFR BLD CALC: 32.5 % — LOW (ref 39–50)
HGB BLD-MCNC: 9.5 G/DL — LOW (ref 13–17)
MAGNESIUM SERPL-MCNC: 1.8 MG/DL — SIGNIFICANT CHANGE UP (ref 1.6–2.6)
MCHC RBC-ENTMCNC: 23.3 PG — LOW (ref 27–34)
MCHC RBC-ENTMCNC: 29.2 GM/DL — LOW (ref 32–36)
MCV RBC AUTO: 79.9 FL — LOW (ref 80–100)
NRBC # BLD: 0 /100 WBCS — SIGNIFICANT CHANGE UP (ref 0–0)
NRBC # FLD: 0 K/UL — SIGNIFICANT CHANGE UP (ref 0–0)
PHOSPHATE SERPL-MCNC: 4 MG/DL — SIGNIFICANT CHANGE UP (ref 2.5–4.5)
PLATELET # BLD AUTO: 92 K/UL — LOW (ref 150–400)
POTASSIUM SERPL-MCNC: 3.7 MMOL/L — SIGNIFICANT CHANGE UP (ref 3.5–5.3)
POTASSIUM SERPL-SCNC: 3.7 MMOL/L — SIGNIFICANT CHANGE UP (ref 3.5–5.3)
RBC # BLD: 4.07 M/UL — LOW (ref 4.2–5.8)
RBC # FLD: 21.6 % — HIGH (ref 10.3–14.5)
SODIUM SERPL-SCNC: 139 MMOL/L — SIGNIFICANT CHANGE UP (ref 135–145)
WBC # BLD: 3.72 K/UL — LOW (ref 3.8–10.5)
WBC # FLD AUTO: 3.72 K/UL — LOW (ref 3.8–10.5)

## 2023-03-20 PROCEDURE — 99239 HOSP IP/OBS DSCHRG MGMT >30: CPT

## 2023-03-20 RX ORDER — FOLIC ACID 0.8 MG
1 TABLET ORAL
Qty: 30 | Refills: 0
Start: 2023-03-20 | End: 2023-04-18

## 2023-03-20 RX ORDER — THIAMINE MONONITRATE (VIT B1) 100 MG
1 TABLET ORAL
Qty: 30 | Refills: 0
Start: 2023-03-20 | End: 2023-04-18

## 2023-03-20 RX ORDER — FERROUS SULFATE 325(65) MG
1 TABLET ORAL
Qty: 13 | Refills: 0
Start: 2023-03-20 | End: 2023-04-18

## 2023-03-20 RX ORDER — MUPIROCIN 20 MG/G
1 OINTMENT TOPICAL
Qty: 0 | Refills: 0 | DISCHARGE
Start: 2023-03-20 | End: 2023-03-24

## 2023-03-20 RX ORDER — PANTOPRAZOLE SODIUM 20 MG/1
1 TABLET, DELAYED RELEASE ORAL
Qty: 30 | Refills: 0
Start: 2023-03-20 | End: 2023-04-18

## 2023-03-20 RX ADMIN — Medication 100 MILLIGRAM(S): at 12:11

## 2023-03-20 RX ADMIN — PANTOPRAZOLE SODIUM 40 MILLIGRAM(S): 20 TABLET, DELAYED RELEASE ORAL at 06:49

## 2023-03-20 RX ADMIN — Medication 325 MILLIGRAM(S): at 12:11

## 2023-03-20 RX ADMIN — Medication 1 MILLIGRAM(S): at 12:11

## 2023-03-20 RX ADMIN — Medication 1 TABLET(S): at 12:11

## 2023-03-20 RX ADMIN — MUPIROCIN 1 APPLICATION(S): 20 OINTMENT TOPICAL at 06:48

## 2023-03-20 NOTE — DISCHARGE NOTE NURSING/CASE MANAGEMENT/SOCIAL WORK - NSDCPEFALRISK_GEN_ALL_CORE
For information on Fall & Injury Prevention, visit: [email or web address] OR  [email or web address] OR  https://www.cdc.gov/steadi/patient.html

## 2023-03-20 NOTE — DISCHARGE NOTE NURSING/CASE MANAGEMENT/SOCIAL WORK - NSDCFUADDAPPT_GEN_ALL_CORE_FT
Upon discharge f/u as outpatient at [facility] [address] [place] [address] [phone] or Wound Center at [facility], [address] [facility] ([phone]).

## 2023-03-20 NOTE — DISCHARGE NOTE NURSING/CASE MANAGEMENT/SOCIAL WORK - NSCORESITESY/N_GEN_A_CORE_RD
Facilitated physical maintenance of bilateral upper extremity and bilateral lower extrimity strength and endurance through NuStep therapeutic exercise.  Patient completed 9 minutes of NuStep activity.    Nu Step Settings:  Seat:  9  Arms:  14  Resistance: 7       No

## 2023-03-20 NOTE — DISCHARGE NOTE NURSING/CASE MANAGEMENT/SOCIAL WORK - PATIENT PORTAL LINK FT
You can access the FollowMyHealth Patient Portal offered by [facility] by registering at the following website: [email or web address]. By joining [employer]’s FollowMyHealth portal, you will also be able to view your health information using other applications (apps) compatible with our system.

## 2023-03-22 LAB
CULTURE RESULTS: SIGNIFICANT CHANGE UP
CULTURE RESULTS: SIGNIFICANT CHANGE UP
SPECIMEN SOURCE: SIGNIFICANT CHANGE UP
SPECIMEN SOURCE: SIGNIFICANT CHANGE UP

## 2023-03-31 ENCOUNTER — EMERGENCY (EMERGENCY)
Facility: HOSPITAL | Age: 33
LOS: 1 days | Discharge: HOME | End: 2023-03-31
Attending: EMERGENCY MEDICINE | Admitting: EMERGENCY MEDICINE
Payer: MEDICAID

## 2023-03-31 VITALS
HEART RATE: 70 BPM | SYSTOLIC BLOOD PRESSURE: 110 MMHG | DIASTOLIC BLOOD PRESSURE: 65 MMHG | HEIGHT: 60 IN | RESPIRATION RATE: 18 BRPM | TEMPERATURE: 98 F | OXYGEN SATURATION: 100 %

## 2023-03-31 VITALS
HEART RATE: 75 BPM | SYSTOLIC BLOOD PRESSURE: 107 MMHG | OXYGEN SATURATION: 100 % | RESPIRATION RATE: 17 BRPM | DIASTOLIC BLOOD PRESSURE: 65 MMHG

## 2023-03-31 PROCEDURE — 99284 EMERGENCY DEPT VISIT MOD MDM: CPT

## 2023-03-31 RX ORDER — IBUPROFEN 200 MG
600 TABLET ORAL ONCE
Refills: 0 | Status: COMPLETED | OUTPATIENT
Start: 2023-03-31 | End: 2023-03-31

## 2023-03-31 RX ORDER — ACETAMINOPHEN 500 MG
650 TABLET ORAL ONCE
Refills: 0 | Status: COMPLETED | OUTPATIENT
Start: 2023-03-31 | End: 2023-03-31

## 2023-03-31 NOTE — ED PROVIDER NOTE - PHYSICAL EXAMINATION
General: WN/WD NAD  Head: Atraumatic  Eyes: EOM grossly in tact, no scleral icterus  ENT: moist mucous membranes  Neurology: A&Ox 3, nonfocal, MCNEIL x 4  Respiratory: normal respiratory effort  CV: Extremities warm and well perfused  Abdominal: Soft, non-distended  Extremities: non-pitting edema of LEs,   Skin: No rashes General: disheveled appearance.   Head: Atraumatic  Eyes: EOM grossly in tact, no scleral icterus  Neurology: A&Ox 2, nonfocal, MCNEIL x 4  Respiratory: normal respiratory effort  CV: Extremities warm and well perfused  Abdominal: Soft, non-distended  Extremities: non-pitting edema of LEs, hyperpigmentation w/o signs of infection. sensation intact in B/L LEs  Psych: pt somnolent, uncooperative with exam

## 2023-03-31 NOTE — ED PROVIDER NOTE - NSFOLLOWUPINSTRUCTIONS_ED_ALL_ED_FT
Please follow up with your primary care physician within 2-3 days.   Return to the ER for any new or concerning symptoms.   You may take 650 mg acetaminophen every eight hours as needed for pain.   Drink plenty of fluids and rest.    If you have any severe increase in pain, fever, uncontrollable nausea/vomiting, or inability to tolerate eating and drinking you need to come back to the emergency room.

## 2023-03-31 NOTE — ED ADULT NURSE NOTE - PATIENT/CAREGIVER ACCEPTED INTERPRETER SERVICES
TO WHOM IT MAY CONCERN    April 9, 2019      Re:     Curtis Stoll  [address] [place] [facility] [address]                        This is to certify that Curtis Stoll has been under my care from 4/9/2019 and would benefit medically from filling with a dog.  This dog helps with symptoms related to his anxiety.      SIGNATURE:___________________________________________,   4/9/2019      Steve Almanza MD         [facility] [place]  [address] [place] [address]         yes

## 2023-03-31 NOTE — ED PROVIDER NOTE - CLINICAL SUMMARY MEDICAL DECISION MAKING FREE TEXT BOX
34 yo M with PMH of cellulitis, presents with concern of leg pain. Differential diagnosis includes but is not limited to neuropathy, recurrent cellulitis, poor fitting socks. Pt needs to cut the band on his socks. would tx with tylenol and ibuprofen and reassess. would set up with sw to help arrange him getting his medications.

## 2023-03-31 NOTE — ED PROVIDER NOTE - PATIENT PORTAL LINK FT
You can access the FollowMyHealth Patient Portal offered by [facility] by registering at the following website: [email or web address]. By joining [employer]’s FollowMyHealth portal, you will also be able to view your health information using other applications (apps) compatible with our system.

## 2023-03-31 NOTE — ED PROVIDER NOTE - PROGRESS NOTE DETAILS
Jesus, PGY-2, EM: Pt deferred taking pain medications, wants to sleep. bj: pt with chronic well documented edema.

## 2023-03-31 NOTE — ED ADULT NURSE REASSESSMENT NOTE - NS ED NURSE REASSESS COMMENT FT1
Patient lying in stretcher. Breathing unlabored. Patient has no complaints. Awaiting metro card from social work

## 2023-03-31 NOTE — ED PROVIDER NOTE - ATTENDING CONTRIBUTION TO CARE
34 yo M with PMH of cellulitis, presents with concern of leg pain. He was discharged from the hospital with prescriptions but was unable to fill them. Pt denies CP, SOB, N/V. Pt repeats he never got his medications, that he was never given them.

## 2023-03-31 NOTE — ED ADULT NURSE NOTE - OBJECTIVE STATEMENT
pt received spot 17a. A&Ox4 respirations even and unlabored completing full sentences. Pt c/o bilateral foot pain and swelling but more pain with the right foot. Pt states was seen in this hospital ~ten days ago but he was unable to fill the prescription d/c with. pt states he has not taken any pain medicine. Pt says he walked from the bar tonight to get to the ER. PMH of ETOH abuse. Pt admits drinking a few drinks tonight. pt endorses last drink was around 11pm. pt foot noted to be swollen. 2+ pulses present bilateral lower extremities. stretcher lowest position siderails up safety maintained.

## 2023-03-31 NOTE — ED PROVIDER NOTE - OBJECTIVE STATEMENT
34 yo M with PMH of cellulitis, presents with concern of leg pain. He was discharged from the hospital with prescriptions but was unable to fill them. 32 yo M with PMH of cellulitis, presents with concern of leg pain. He was discharged from the hospital with prescriptions but was unable to fill them. Pt denies CP, SOB, N/V. Pt repeats he never got his medications, that he was never given them.     Hx obtained with Dane ID [id number]

## 2023-03-31 NOTE — ED ADULT TRIAGE NOTE - CHIEF COMPLAINT QUOTE
Pt is c/o bilateral foot pain and swelling but more pain on the right foot. Pt was seen in this hospital ten days ago but he was unable to fill the prescription. Has not taken any pain medicine. Pt says he walked from far tonight to get to the ER. PMH of ETOH abuse. Pt admits drinking a few drinks tonight. The last drink was around 11pm.

## 2023-03-31 NOTE — CHART NOTE - NSCHARTNOTEFT_GEN_A_CORE
SW made aware by provider that patient has been cleared for d/c. SW asking for assistance with transportation home. SW provided patient with metro card [phone]. Team aware. No other SW needs at this time.

## 2023-04-04 ENCOUNTER — EMERGENCY (EMERGENCY)
Facility: HOSPITAL | Age: 33
LOS: 1 days | Discharge: HOME | End: 2023-04-04
Attending: EMERGENCY MEDICINE | Admitting: EMERGENCY MEDICINE
Payer: MEDICAID

## 2023-04-04 VITALS
OXYGEN SATURATION: 97 % | RESPIRATION RATE: 18 BRPM | HEIGHT: 60 IN | DIASTOLIC BLOOD PRESSURE: 68 MMHG | SYSTOLIC BLOOD PRESSURE: 110 MMHG | HEART RATE: 99 BPM | TEMPERATURE: 98 F

## 2023-04-04 PROCEDURE — 99284 EMERGENCY DEPT VISIT MOD MDM: CPT

## 2023-04-04 RX ORDER — IBUPROFEN 200 MG
600 TABLET ORAL ONCE
Refills: 0 | Status: COMPLETED | OUTPATIENT
Start: 2023-04-04 | End: 2023-04-04

## 2023-04-04 RX ADMIN — Medication 600 MILLIGRAM(S): at 23:50

## 2023-04-04 NOTE — ED PROVIDER NOTE - PATIENT PORTAL LINK FT
You can access the FollowMyHealth Patient Portal offered by [facility] by registering at the following website: [email or web address]. By joining [facility]’s FollowMyHealth portal, you will also be able to view your health information using other applications (apps) compatible with our system.

## 2023-04-04 NOTE — ED PROVIDER NOTE - PROGRESS NOTE DETAILS
Eugene Marsh DO (PGY1)  patient with improvement of molar pain. contacted S/W to arrange f/u for shelter Eugene Marsh DO (PGY1)  patient given info for shelter. will d/c

## 2023-04-04 NOTE — ED PROVIDER NOTE - ATTENDING CONTRIBUTION TO CARE
This is a 32 y/o M PMHx EtOH abuse, lower leg cellulitis, homeless p/w b/l foot pain x years, and L upper molar pain x 2 days that he attributes to a cavity. Denies any fevers, chills, chest pain, SOB, abdominal pain, nausea, vomiting, or urinary complaints. Denies any trauma, or difficulty swallowing. . B/l LE edema with hyperpigmentation. Plan- Pain control with Motrin, PO hydration and food, Signed out to night team as  consult for dispo

## 2023-04-04 NOTE — ED PROVIDER NOTE - OBJECTIVE STATEMENT
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  Patient states the pain started 2 days ago, he feels like he has a cavity there.  Patient is never seen a dentist before.  Denies any fevers, stridor, difficulty breathing, drooling.  Denies any taking any medication for this pain.  States [place] lives on the [address] and drink up to 8 beers today to help alleviate the pain has not helped.  Not endorsing any other pain at this time.  Denies any chest pain, shortness of breath, abdominal pain, nausea vomiting diarrhea.     Maltese [id number]

## 2023-04-04 NOTE — ED PROVIDER NOTE - CLINICAL SUMMARY MEDICAL DECISION MAKING FREE TEXT BOX
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  Patient states the pain started 2 days ago, he feels like he has a cavity there.  Patient is never seen a dentist before.  Denies any fevers, stridor, difficulty breathing, drooling.  Denies any taking any medication for this pain.  States he lives on the [place] and drink up to 8 beers today to help alleviate the pain has not helped.  Not endorsing any other pain at this time    Vital signs stable, patient afebrile. No signs of active infection in left upper molar. No signs of trauma. Patient is AOx3 however endorsing alcohol intake. Will pain control and offer SW assistance for shelter

## 2023-04-04 NOTE — ED ADULT TRIAGE NOTE - CHIEF COMPLAINT QUOTE
Pt c/o b/l foot pain (L>R) x years, and left molar pain 2x days. Pt also requesting 2nd COVID vaccine. Admits to drinking 8x beers today. PMH ETOH abuse

## 2023-04-04 NOTE — ED PROVIDER NOTE - PHYSICAL EXAMINATION
Eugene Marsh DO (PGY1)   Physical Exam:    Gen: NAD, AOx3  Head: NCAT  HEENT: EOMI, PEERLA, normal conjunctiva, tongue midline, oral mucosa moist, no ttp surrounding left upper molars, no signs of active infection   Lung: CTAB, no respiratory distress, no wheezes/rhonchi/rales B/L  CV: RRR, no murmurs, rubs or gallops  Abd: soft, NT, ND, no guarding, no rigidity, no rebound tenderness, no CVA tenderness   MSK: no visible deformities, ROM normal in UE/LE, no back pain  Neuro: No focal sensory or motor deficits  Skin: Warm, well perfused, no rash, no leg swelling

## 2023-04-04 NOTE — ED ADULT NURSE NOTE - OBJECTIVE STATEMENT
Pt is alert and orientedx4, ambulatory at baseline. Pt presents to the ED with intox after drinking 8 beers as well as a toothache and B/L feet pain. Pt reorts he is homeless and does not have a permanet place of living. Pt denies chest pain, shortness of breath, dyspnea on exertion, breathing is unlabored and even. Pt denies nausea, vomiting or diarrhea. 20G IV placed in left AC, labs drawn, awaiting further orders. Call bell within reach, bed in lowest position, will continue to monitor.

## 2023-04-04 NOTE — ED PROVIDER NOTE - NSFOLLOWUPINSTRUCTIONS_ED_ALL_ED_FT
To control your pain at home, you should take Ibuprofen 400 mg along with Tylenol 650mg-1000mg every 6 to 8 hours. Limit your maximum daily Tylenol from all sources to 4000mg. Be aware that many other medications contain acetaminophen which is also known as Tylenol. Taking Tylenol and Ibuprofen together has been shown to be more effective at relieving pain than taking them separately. These are both over the counter medications that you can  at your local pharmacy without a prescription. You need to respect all of the warnings on the bottles. You shouldn’t take these medications for more than a week without following up with your doctor. Both medications come with certain risks and side effects that you need to discuss with your doctor, especially if you are taking them for a prolonged period.    Please follow up with your primary medical doctor within two days.  Return to the emergency department if you feel that your condition is worsening    You can take Tylenol or Advil for pain. Please follow the instructions on the bottles.    Stay hydrated    You were given resources to go to a shelter To control your pain at home, you should take Ibuprofen 400 mg along with Tylenol 650mg-1000mg every 6 to 8 hours. Limit your maximum daily Tylenol from all sources to 4000mg. Be aware that many other medications contain acetaminophen which is also known as Tylenol. Taking Tylenol and Ibuprofen together has been shown to be more effective at relieving pain than taking them separately. These are both over the counter medications that you can  at your local pharmacy without a prescription. You need to respect all of the warnings on the bottles. You shouldn’t take these medications for more than a week without following up with your doctor. Both medications come with certain risks and side effects that you need to discuss with your doctor, especially if you are taking them for a prolonged period.    Please follow up with your primary medical doctor within two days.  Return to the emergency department if you feel that your condition is worsening    You can take Tylenol or Advil for pain. Please follow the instructions on the bottles.    Stay hydrated      Para controlar batista dolor en casa, debe federica ibuprofeno 400 mg junto con Tylenol 650mg-1000mg cada 6 a 8 horas. Limite batista Tylenol diario abril de todas las lee a 4000mg. Tenga en cuenta que muchos otros medicamentos contienen acetaminofén que también se conoce prashant Tylenol. Se ha demostrado que federica Tylenol e ibuprofeno juntos es más efectivo para aliviar el dolor que tomarlos por separado.        You were given resources to go to a shelter      Por favor, steffanie un seguimiento con batista médico de cabecera dentro de dos días.  Regrese al departamento de emergencias si siente que batista afección está empeorando    Puede federica Tylenol o Advil para el dolor. Por favor, siga las instrucciones en las botellas.    Manténgase hidratado

## 2023-04-05 VITALS
HEART RATE: 96 BPM | SYSTOLIC BLOOD PRESSURE: 102 MMHG | DIASTOLIC BLOOD PRESSURE: 63 MMHG | RESPIRATION RATE: 16 BRPM | OXYGEN SATURATION: 98 % | TEMPERATURE: 98 F

## 2023-04-05 NOTE — ED ADULT NURSE REASSESSMENT NOTE - NS ED NURSE REASSESS COMMENT FT1
PT is resting in stretcher, easily arousable to verbal stimuli. no apparent distress noted during  time of coverage. hand off with be given to primary nurse when return to area.

## 2023-04-05 NOTE — PROVIDER CONTACT NOTE (OTHER) - ASSESSMENT
Met with pt at bedside; pt reports he has no permanent home.  He is open to shelter resources; states he can travel via public transportation.

## 2023-05-10 NOTE — ED ADULT NURSE NOTE - ISOLATION TYPE:
None Peng Advancement Flap Text: The defect edges were debeveled with a #15 scalpel blade.  Given the location of the defect, shape of the defect and the proximity to free margins a Peng advancement flap was deemed most appropriate.  Using a sterile surgical marker, an appropriate advancement flap was drawn incorporating the defect and placing the expected incisions within the relaxed skin tension lines where possible. The area thus outlined was incised deep to adipose tissue with a #15 scalpel blade.  The skin margins were undermined to an appropriate distance in all directions utilizing iris scissors.

## 2023-06-19 ENCOUNTER — INPATIENT (INPATIENT)
Facility: HOSPITAL | Age: 33
LOS: 5 days | Discharge: HOME | End: 2023-06-25
Attending: HOSPITALIST | Admitting: HOSPITALIST
Payer: MEDICAID

## 2023-06-19 VITALS
RESPIRATION RATE: 17 BRPM | HEART RATE: 94 BPM | OXYGEN SATURATION: 100 % | TEMPERATURE: 98 F | DIASTOLIC BLOOD PRESSURE: 79 MMHG | SYSTOLIC BLOOD PRESSURE: 125 MMHG

## 2023-06-19 PROCEDURE — 99285 EMERGENCY DEPT VISIT HI MDM: CPT

## 2023-06-19 NOTE — ED ADULT TRIAGE NOTE - CHIEF COMPLAINT QUOTE
Pt arrives ambulatory w/ cane to triage c/o abd pain and right foot swelling. Pt endorses drinking 6 beers today, normally has 12 per day. Bugs noted to be crawling on pts clothing on arrival. Denies PMHx.

## 2023-06-20 DIAGNOSIS — D61.818 OTHER PANCYTOPENIA: ICD-10-CM

## 2023-06-20 DIAGNOSIS — T14.8XXA OTHER INJURY OF UNSPECIFIED BODY REGION, INITIAL ENCOUNTER: ICD-10-CM

## 2023-06-20 DIAGNOSIS — R74.01 ELEVATION OF LEVELS OF LIVER TRANSAMINASE LEVELS: ICD-10-CM

## 2023-06-20 DIAGNOSIS — F10.929 ALCOHOL USE, UNSPECIFIED WITH INTOXICATION, UNSPECIFIED: ICD-10-CM

## 2023-06-20 DIAGNOSIS — M79.89 OTHER SPECIFIED SOFT TISSUE DISORDERS: ICD-10-CM

## 2023-06-20 DIAGNOSIS — M79.661 PAIN IN RIGHT LOWER LEG: ICD-10-CM

## 2023-06-20 DIAGNOSIS — Z29.9 ENCOUNTER FOR PROPHYLACTIC MEASURES, UNSPECIFIED: ICD-10-CM

## 2023-06-20 LAB
ALBUMIN SERPL ELPH-MCNC: 3.1 G/DL — LOW (ref 3.3–5)
ALP SERPL-CCNC: 169 U/L — HIGH (ref 40–120)
ALT FLD-CCNC: 18 U/L — SIGNIFICANT CHANGE UP (ref 4–41)
ANION GAP SERPL CALC-SCNC: 13 MMOL/L — SIGNIFICANT CHANGE UP (ref 7–14)
APTT BLD: 39.2 SEC — HIGH (ref 27–36.3)
AST SERPL-CCNC: 77 U/L — HIGH (ref 4–40)
BILIRUB SERPL-MCNC: 0.7 MG/DL — SIGNIFICANT CHANGE UP (ref 0.2–1.2)
BUN SERPL-MCNC: 3 MG/DL — LOW (ref 7–23)
CA-I BLD-SCNC: 1 MMOL/L — LOW (ref 1.15–1.29)
CALCIUM SERPL-MCNC: 7.7 MG/DL — LOW (ref 8.4–10.5)
CHLORIDE SERPL-SCNC: 104 MMOL/L — SIGNIFICANT CHANGE UP (ref 98–107)
CO2 SERPL-SCNC: 24 MMOL/L — SIGNIFICANT CHANGE UP (ref 22–31)
CREAT SERPL-MCNC: 0.38 MG/DL — LOW (ref 0.5–1.3)
EGFR: 151 ML/MIN/1.73M2 — SIGNIFICANT CHANGE UP
ETHANOL SERPL-MCNC: 327 MG/DL — HIGH
GLUCOSE BLDC GLUCOMTR-MCNC: 75 MG/DL — SIGNIFICANT CHANGE UP (ref 70–99)
GLUCOSE SERPL-MCNC: 92 MG/DL — SIGNIFICANT CHANGE UP (ref 70–99)
HCT VFR BLD CALC: 30.4 % — LOW (ref 39–50)
HGB BLD-MCNC: 9.2 G/DL — LOW (ref 13–17)
INR BLD: 1.27 RATIO — HIGH (ref 0.88–1.16)
LIDOCAIN IGE QN: 34 U/L — SIGNIFICANT CHANGE UP (ref 7–60)
MCHC RBC-ENTMCNC: 24 PG — LOW (ref 27–34)
MCHC RBC-ENTMCNC: 30.3 GM/DL — LOW (ref 32–36)
MCV RBC AUTO: 79.2 FL — LOW (ref 80–100)
NRBC # BLD: 0 /100 WBCS — SIGNIFICANT CHANGE UP (ref 0–0)
NRBC # FLD: 0 K/UL — SIGNIFICANT CHANGE UP (ref 0–0)
PLATELET # BLD AUTO: 42 K/UL — LOW (ref 150–400)
POTASSIUM SERPL-MCNC: 3.4 MMOL/L — LOW (ref 3.5–5.3)
POTASSIUM SERPL-SCNC: 3.4 MMOL/L — LOW (ref 3.5–5.3)
PROT SERPL-MCNC: 7.8 G/DL — SIGNIFICANT CHANGE UP (ref 6–8.3)
PROTHROM AB SERPL-ACNC: 14.8 SEC — HIGH (ref 10.5–13.4)
RBC # BLD: 3.84 M/UL — LOW (ref 4.2–5.8)
RBC # FLD: 21.7 % — HIGH (ref 10.3–14.5)
SODIUM SERPL-SCNC: 141 MMOL/L — SIGNIFICANT CHANGE UP (ref 135–145)
WBC # BLD: 3.69 K/UL — LOW (ref 3.8–10.5)
WBC # FLD AUTO: 3.69 K/UL — LOW (ref 3.8–10.5)

## 2023-06-20 PROCEDURE — 70450 CT HEAD/BRAIN W/O DYE: CPT | Mod: 26,ME

## 2023-06-20 PROCEDURE — G1004: CPT

## 2023-06-20 PROCEDURE — 99223 1ST HOSP IP/OBS HIGH 75: CPT

## 2023-06-20 RX ORDER — SODIUM CHLORIDE 9 MG/ML
1000 INJECTION, SOLUTION INTRAVENOUS
Refills: 0 | Status: DISCONTINUED | OUTPATIENT
Start: 2023-06-20 | End: 2023-06-21

## 2023-06-20 RX ORDER — LANOLIN ALCOHOL/MO/W.PET/CERES
3 CREAM (GRAM) TOPICAL AT BEDTIME
Refills: 0 | Status: DISCONTINUED | OUTPATIENT
Start: 2023-06-20 | End: 2023-06-25

## 2023-06-20 RX ORDER — METOCLOPRAMIDE HCL 10 MG
10 TABLET ORAL ONCE
Refills: 0 | Status: COMPLETED | OUTPATIENT
Start: 2023-06-20 | End: 2023-06-20

## 2023-06-20 RX ORDER — FAMOTIDINE 10 MG/ML
20 INJECTION INTRAVENOUS ONCE
Refills: 0 | Status: COMPLETED | OUTPATIENT
Start: 2023-06-20 | End: 2023-06-20

## 2023-06-20 RX ORDER — THIAMINE MONONITRATE (VIT B1) 100 MG
100 TABLET ORAL ONCE
Refills: 0 | Status: COMPLETED | OUTPATIENT
Start: 2023-06-20 | End: 2023-06-20

## 2023-06-20 RX ORDER — POTASSIUM CHLORIDE 20 MEQ
40 PACKET (EA) ORAL ONCE
Refills: 0 | Status: COMPLETED | OUTPATIENT
Start: 2023-06-20 | End: 2023-06-20

## 2023-06-20 RX ORDER — THIAMINE MONONITRATE (VIT B1) 100 MG
500 TABLET ORAL EVERY 8 HOURS
Refills: 0 | Status: COMPLETED | OUTPATIENT
Start: 2023-06-20 | End: 2023-06-23

## 2023-06-20 RX ORDER — CALCIUM GLUCONATE 100 MG/ML
1 VIAL (ML) INTRAVENOUS ONCE
Refills: 0 | Status: COMPLETED | OUTPATIENT
Start: 2023-06-20 | End: 2023-06-20

## 2023-06-20 RX ORDER — CALCIUM GLUCONATE 100 MG/ML
1 VIAL (ML) INTRAVENOUS ONCE
Refills: 0 | Status: DISCONTINUED | OUTPATIENT
Start: 2023-06-20 | End: 2023-06-20

## 2023-06-20 RX ORDER — ONDANSETRON 8 MG/1
4 TABLET, FILM COATED ORAL EVERY 8 HOURS
Refills: 0 | Status: DISCONTINUED | OUTPATIENT
Start: 2023-06-20 | End: 2023-06-25

## 2023-06-20 RX ORDER — ACETAMINOPHEN 500 MG
650 TABLET ORAL EVERY 6 HOURS
Refills: 0 | Status: DISCONTINUED | OUTPATIENT
Start: 2023-06-20 | End: 2023-06-20

## 2023-06-20 RX ORDER — FOLIC ACID 0.8 MG
1 TABLET ORAL DAILY
Refills: 0 | Status: DISCONTINUED | OUTPATIENT
Start: 2023-06-20 | End: 2023-06-25

## 2023-06-20 RX ORDER — ACETAMINOPHEN 500 MG
1000 TABLET ORAL ONCE
Refills: 0 | Status: COMPLETED | OUTPATIENT
Start: 2023-06-20 | End: 2023-06-20

## 2023-06-20 RX ORDER — PANTOPRAZOLE SODIUM 20 MG/1
40 TABLET, DELAYED RELEASE ORAL
Refills: 0 | Status: DISCONTINUED | OUTPATIENT
Start: 2023-06-20 | End: 2023-06-25

## 2023-06-20 RX ORDER — SODIUM CHLORIDE 9 MG/ML
1000 INJECTION INTRAMUSCULAR; INTRAVENOUS; SUBCUTANEOUS ONCE
Refills: 0 | Status: COMPLETED | OUTPATIENT
Start: 2023-06-20 | End: 2023-06-20

## 2023-06-20 RX ADMIN — SODIUM CHLORIDE 75 MILLILITER(S): 9 INJECTION, SOLUTION INTRAVENOUS at 20:00

## 2023-06-20 RX ADMIN — Medication 100 MILLIGRAM(S): at 02:55

## 2023-06-20 RX ADMIN — Medication 1 MILLIGRAM(S): at 19:22

## 2023-06-20 RX ADMIN — SODIUM CHLORIDE 1000 MILLILITER(S): 9 INJECTION INTRAMUSCULAR; INTRAVENOUS; SUBCUTANEOUS at 02:58

## 2023-06-20 RX ADMIN — Medication 1000 MILLIGRAM(S): at 03:28

## 2023-06-20 RX ADMIN — Medication 1 TABLET(S): at 19:22

## 2023-06-20 RX ADMIN — Medication 105 MILLIGRAM(S): at 19:22

## 2023-06-20 RX ADMIN — Medication 10 MILLIGRAM(S): at 07:14

## 2023-06-20 RX ADMIN — FAMOTIDINE 20 MILLIGRAM(S): 10 INJECTION INTRAVENOUS at 02:56

## 2023-06-20 RX ADMIN — Medication 400 MILLIGRAM(S): at 02:58

## 2023-06-20 RX ADMIN — Medication 40 MILLIEQUIVALENT(S): at 18:39

## 2023-06-20 RX ADMIN — Medication 100 GRAM(S): at 05:06

## 2023-06-20 RX ADMIN — Medication 2 MILLIGRAM(S): at 18:39

## 2023-06-20 RX ADMIN — Medication 50 MILLIGRAM(S): at 10:58

## 2023-06-20 NOTE — H&P ADULT - HISTORY OF PRESENT ILLNESS
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 Currently, patient is AAOx2-3 and a poor historian. Most of the hx was obtained from chart review. Patient was brought to the ED by EMS for intoxication. He has been complaining about R sided headache and R LE pain. He denies any recent trauma or fall. He also denies any LOC. He has been drinking alcohol and his last drink was yesterday. He drank 12 cans of beer yesterday. He has been in the hospital for similar symptoms and was treated for alcohol intoxication and withdrawal.   In the ED, his vitals were notable for tachycardia. Labs were notable for chronic pancytopenia, elevated INR/PT/PTT, hypokalemia, hypocalcemia. CT head showed Small right occipital scalp hematoma.

## 2023-06-20 NOTE — H&P ADULT - NSHPREVIEWOFSYSTEMS_GEN_ALL_CORE
REVIEW OF SYSTEMS:    CONSTITUTIONAL: No weakness, fevers or chills. +headache  EYES/ENT: No visual changes;  No vertigo or throat pain   NECK: No pain or stiffness  RESPIRATORY: No cough, wheezing, hemoptysis; No shortness of breath  CARDIOVASCULAR: No chest pain or palpitations  GASTROINTESTINAL: No abdominal or epigastric pain. No nausea, vomiting, or hematemesis; No diarrhea or constipation. No melena or hematochezia.  GENITOURINARY: No dysuria, frequency or hematuria  NEUROLOGICAL: No numbness or weakness  MUSCULOSKELETAL:+ RLE pain    SKIN: No itching, burning, rashes, or lesions   All other review of systems is negative unless indicated above.

## 2023-06-20 NOTE — H&P ADULT - NSHPLABSRESULTS_GEN_ALL_CORE
9.2    3.69  )-----------( 42       ( 20 Jun 2023 02:45 )             30.4     Hgb Trend: 9.2<--  06-20    141  |  104  |  3<L>  ----------------------------<  92  3.4<L>   |  24  |  0.38<L>    Ca    7.7<L>      20 Jun 2023 02:45    TPro  7.8  /  Alb  3.1<L>  /  TBili  0.7  /  DBili  x   /  AST  77<H>  /  ALT  18  /  AlkPhos  169<H>  06-20    Creatinine Trend: 0.38<--  PT/INR - ( 20 Jun 2023 02:45 )   PT: 14.8 sec;   INR: 1.27 ratio         PTT - ( 20 Jun 2023 02:45 )  PTT:39.2 sec        CT head as reviewed by the radiologist: Small right occipital scalp hematoma. No acute intracranial hemorrhage,   extra-axial collection available fracture.  Inflammatory paranasal sinus disease.      EKG is reviewed by me

## 2023-06-20 NOTE — H&P ADULT - ASSESSMENT
HPI     Last Eye Exam 2019   Patient last seen on 08/24/2018 with DNL for full exam and pseudotumor   cerebri.  Diamox once a week per patient  LP 11/2019 neuromedical (second one)  HPI    Any vision changes since last exam: No  Eye pain: No  Other ocular symptoms: No    Do you wear currently wear glasses or contacts? Both    Interested in contacts today? Yes    Do you plan on getting new glasses today? Yes        Last edited by Andres Holt, OD on 5/29/2020  9:55 AM. (History)            Assessment /Plan     For exam results, see Encounter Report.    Pseudotumor cerebri  Continue Diamox, pt unable to tolerate taking it bid  Pt instructed to f/u with neurology    Myopia of both eyes with astigmatism  Eyeglass Final Rx     Eyeglass Final Rx       Sphere Cylinder Axis    Right -1.75 +0.50 020    Left -2.50 +0.50 150    Expiration Date:  5/30/2021              Contact Lens Prescription (5/29/2020)        Brand Sphere    Right Air Optix Aqua -1.50    Left Air Optix Aqua -2.25    Expiration Date:  5/30/2021    Replacement:  Monthly    Wearing Schedule:  Daily wear          RTC next available for 24-2VF or PRN  Discussed above and all questions were answered.                     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

## 2023-06-20 NOTE — ED ADULT NURSE REASSESSMENT NOTE - NS ED NURSE REASSESS COMMENT FT1
Pt is sleeping; bilateral chest wall expansion noted; wakes up occasionally; no visible tremors noted.

## 2023-06-20 NOTE — H&P ADULT - PROBLEM SELECTOR PLAN 3
Due to alcohol consumption   -No signs of active bleeding. CT head showed small R occipital hematoma   -Trend CBC daily   -F/u with iron studies   -transfuse pRBCs if Hgb <7   -transfuse plts if plts <10, <15 with fever, or <20 with bleeding

## 2023-06-20 NOTE — SBIRT NOTE ADULT - NSSBIRTBRIEFINTDET_GEN_A_CORE
Provided SBIRT services: Full screen positive. Brief Intervention Performed. Screening results were reviewed with the patient and patient was provided information about healthy guidelines and potential negative consequences associated with level of risk. Motivation and readiness to reduce or stop use was discussed and goals and activities to make changes were suggested/offered.   Provided SBIRT services: Full screen positive. Brief Intervention Performed. Screening results were reviewed with the patient and patient was provided information about healthy guidelines and potential negative consequences associated with level of risk. Motivation and readiness to reduce or stop use was discussed and goals and activities to make changes were suggested/offered.    Pt expressed motivation to quit alcohol use. Discussed with patient [facility] ([phone]) which offers free services for for uninsured patients, however, pt is currently ambivalent whether he would like to pursue treatment. Pt states he would like time to think about it. Pt was provided with contact information.

## 2023-06-20 NOTE — ED PROVIDER NOTE - OBJECTIVE STATEMENT
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 Pt states he does not know how he got here but he is complaining of a headache that he has had since yesterday as well as chronic pain to the right lower extremity which he has had for years and has been seen in the ED for in the past. 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 Pt states he does not know how he got here but he is complaining of a headache that he has had since yesterday as well as chronic pain to the right lower extremity which he has had for years and has been seen in the ED for in the past. He reports drinking 12 beers today which is how many he usually drinks. He denies any recent fevers, chills, SOB, or abd pain. He denies any recent falls or trauma over the past few days. 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 Pt states he does not know how he got here but he is complaining of a headache that he has had since yesterday as well as chronic pain to the right lower extremity which he has had for years and has been seen in the ED for in the past. He also reports some epigastric pain with nausea and an episode of nonbloody emesis yesterday. He reports drinking 12 beers today which is how many he usually drinks. He denies any recent fevers, chills, SOB. He denies any recent falls or trauma over the past few days.

## 2023-06-20 NOTE — H&P ADULT - NSHPPHYSICALEXAM_GEN_ALL_CORE
Vital Signs Last 24 Hrs  T(C): 37.7 (20 Jun 2023 17:48), Max: 37.7 (20 Jun 2023 17:48)  T(F): 99.8 (20 Jun 2023 17:48), Max: 99.8 (20 Jun 2023 17:48)  HR: 100 (20 Jun 2023 17:48) (88 - 100)  BP: 129/80 (20 Jun 2023 17:48) (102/61 - 129/80)  BP(mean): --  RR: 16 (20 Jun 2023 17:48) (16 - 20)  SpO2: 100% (20 Jun 2023 17:48) (97% - 100%)    Parameters below as of 20 Jun 2023 17:48  Patient On (Oxygen Delivery Method): room air    GENERAL: NAD, well-developed, skin flushed, slight tremors in UE  HEENT:  Atraumatic, Normocephalic, EOMI, PERRLA, conjunctiva and sclera clear, oral mucosa moist, clear w/o any exudate   NECK: Supple, No JVD  CHEST/LUNG: Clear to auscultation bilaterally; No wheeze  HEART: Regular rate and rhythm; No murmurs, rubs, or gallops  ABDOMEN: Soft, Nontender, Nondistended; Bowel sounds present  EXTREMITIES:  2+ Peripheral Pulses, RLE swelling, TTP, no open wounds.   PSYCH: AAOx3, normal affect  NEUROLOGY: non-focal, moving all extremities  SKIN: No rashes or lesions

## 2023-06-20 NOTE — ED PROVIDER NOTE - PROGRESS NOTE DETAILS
Oliver, PGY3: Patient received as sign out, initially presented with intoxication, also with headache and RLE pain from chronic leg wound. Leg wound appears chronic in comparison to previous documentation. Patient clinically sober but with persistent HA. CTH without intracranial pathology. Reglan given. Will reassess. Oliver, PGY3: Patient reassessed, still complaining of leg pain/headache, unable to ambulate. Now with very mild tremors of bilateral upper extremities. Plan for admission, hospitalist paged.

## 2023-06-20 NOTE — ED ADULT NURSE NOTE - NSFALLRISKINTERV_ED_ALL_ED
Assistance OOB with selected safe patient handling equipment if applicable/Assistance with ambulation/Communicate fall risk and risk factors to all staff, patient, and family/Monitor gait and stability/Monitor for mental status changes and reorient to person, place, and time, as needed/Provide patient with walking aids/Provide visual cue: yellow wristband, yellow gown, etc/Reinforce activity limits and safety measures with patient and family/Toileting schedule using arm’s reach rule for commode and bathroom/Use of alarms - bed, stretcher, chair and/or video monitoring/Call bell, personal items and telephone in reach/Instruct patient to call for assistance before getting out of bed/chair/stretcher/Non-slip footwear applied when patient is off stretcher/[place] to call system/Physically safe environment - no spills, clutter or unnecessary equipment/Purposeful Proactive Rounding/Room/bathroom lighting operational, light cord in reach

## 2023-06-20 NOTE — H&P ADULT - PROBLEM SELECTOR PLAN 5
Has chronic RLE swelling and wound   -No signs of active infection or cellulitis   -F/u with VA duplex LE

## 2023-06-20 NOTE — ED PROVIDER NOTE - ATTENDING CONTRIBUTION TO CARE
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 Patient states pain is chronic, also states that he vomited, nonbloody nonbilious prior to arrival.  On exam abdomen is soft nontender, lungs clear to auscultation bilaterally, stable vital signs.  Labs notable for normal lipase, normal bilirubin, LFT pattern slightly elevated in setting of alcohol use, also noted to have thrombocytopenia which she has had on prior lab work in this hospital, also noted to have mild hypocalcemia we will treat with calcium gluconate.  CT head showing no intracranial hemorrhage.  Will allow to metabolize, reassess and likely discharge.    : Neal (Greek) [id number]

## 2023-06-20 NOTE — H&P ADULT - PROBLEM SELECTOR PLAN 2
Patient with alcohol intoxication, found to have alcohol level of 327   -CIWA protocol with symptom trigger ativan   -C/w folic acid and thiamine IV   -C/w IVF @75 cc per hr for 12 hrs

## 2023-06-20 NOTE — ED PROVIDER NOTE - PHYSICAL EXAMINATION
GENERAL: + Pt appears intoxicated, disheveled, Not in acute distress, non-toxic appearing  HEAD: normocephalic, atraumatic  HEENT: PERRLA, EOMI, normal conjunctiva, oral mucosa moist, neck supple  CARDIAC: regular rate and rhythm, normal S1 and S2,  no appreciable murmurs  PULM: clear to ascultation bilaterally, no crackles, rales, rhonchi, or wheezing  GI: abdomen nondistended, soft, nontender, no guarding or rebound tenderness  NEURO: alert and oriented x 3, + slurred speech, no gross neurologic deficit  MSK: No visible deformities, + significant swelling to the right lower extremity with healing wound on the right anterior shin, RLE is tender to palpation, erythematous,   SKIN: dry, well-perfused, + erythema to the right lower extremity  PSYCH: appropriate mood and affect

## 2023-06-20 NOTE — ED ADULT NURSE NOTE - OBJECTIVE STATEMENT
break coverage RN - pt received in room 12. Pt minimally cooperative with assessment, admits to drinking "12 beers" today. Per triage pt c/o abd pain and right foot swelling. To this RN pt c/o chest pain. Also per triage, Bugs noted to be crawling on pts clothing on arrival, pt taken to decon room. Resp even and unlabored. Denies any other complaints at this time. Pending further orders at this time. Ongoing eval in progress.

## 2023-06-20 NOTE — ED PROVIDER NOTE - CLINICAL SUMMARY MEDICAL DECISION MAKING FREE TEXT BOX
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 Pt states he does not know how he got here but he is complaining of a headache that he has had since yesterday as well as chronic pain to the right lower extremity which he has had for years and has been seen in the ED for in the past. He also reports some epigastric pain, nausea and vomiting. On exam abdomen is soft nontender, lungs clear to auscultation bilaterally, stable vital signs. DDx includes but not limited to etoh intoxication, worsening liver cirrhosis, pancreatitis, electrolyte abnormalities. Will order CBC, CMP, lipase, coags, CT of head. Will reassess pt to better evaluate and to determine disposition.

## 2023-06-21 LAB
ALBUMIN SERPL ELPH-MCNC: 2.9 G/DL — LOW (ref 3.3–5)
ALP SERPL-CCNC: 172 U/L — HIGH (ref 40–120)
ALT FLD-CCNC: 15 U/L — SIGNIFICANT CHANGE UP (ref 4–41)
ANION GAP SERPL CALC-SCNC: 12 MMOL/L — SIGNIFICANT CHANGE UP (ref 7–14)
AST SERPL-CCNC: 72 U/L — HIGH (ref 4–40)
BASOPHILS # BLD AUTO: 0.02 K/UL — SIGNIFICANT CHANGE UP (ref 0–0.2)
BASOPHILS NFR BLD AUTO: 0.5 % — SIGNIFICANT CHANGE UP (ref 0–2)
BILIRUB SERPL-MCNC: 1.5 MG/DL — HIGH (ref 0.2–1.2)
BUN SERPL-MCNC: 4 MG/DL — LOW (ref 7–23)
CA-I BLD-SCNC: 1.09 MMOL/L — LOW (ref 1.15–1.29)
CALCIUM SERPL-MCNC: 8.5 MG/DL — SIGNIFICANT CHANGE UP (ref 8.4–10.5)
CHLORIDE SERPL-SCNC: 96 MMOL/L — LOW (ref 98–107)
CHOLEST SERPL-MCNC: 154 MG/DL — SIGNIFICANT CHANGE UP
CO2 SERPL-SCNC: 23 MMOL/L — SIGNIFICANT CHANGE UP (ref 22–31)
CREAT SERPL-MCNC: 0.37 MG/DL — LOW (ref 0.5–1.3)
EGFR: 152 ML/MIN/1.73M2 — SIGNIFICANT CHANGE UP
EOSINOPHIL # BLD AUTO: 0.08 K/UL — SIGNIFICANT CHANGE UP (ref 0–0.5)
EOSINOPHIL NFR BLD AUTO: 2.2 % — SIGNIFICANT CHANGE UP (ref 0–6)
FERRITIN SERPL-MCNC: 28 NG/ML — LOW (ref 30–400)
GLUCOSE SERPL-MCNC: 70 MG/DL — SIGNIFICANT CHANGE UP (ref 70–99)
HCT VFR BLD CALC: 30.6 % — LOW (ref 39–50)
HDLC SERPL-MCNC: 59 MG/DL — SIGNIFICANT CHANGE UP
HGB BLD-MCNC: 9.4 G/DL — LOW (ref 13–17)
IANC: 2.45 K/UL — SIGNIFICANT CHANGE UP (ref 1.8–7.4)
IMM GRANULOCYTES NFR BLD AUTO: 0.3 % — SIGNIFICANT CHANGE UP (ref 0–0.9)
IRON SATN MFR SERPL: 37 % — SIGNIFICANT CHANGE UP (ref 14–50)
IRON SATN MFR SERPL: 99 UG/DL — SIGNIFICANT CHANGE UP (ref 45–165)
LIPID PNL WITH DIRECT LDL SERPL: 81 MG/DL — SIGNIFICANT CHANGE UP
LYMPHOCYTES # BLD AUTO: 0.67 K/UL — LOW (ref 1–3.3)
LYMPHOCYTES # BLD AUTO: 18.1 % — SIGNIFICANT CHANGE UP (ref 13–44)
MAGNESIUM SERPL-MCNC: 1.4 MG/DL — LOW (ref 1.6–2.6)
MCHC RBC-ENTMCNC: 24.5 PG — LOW (ref 27–34)
MCHC RBC-ENTMCNC: 30.7 GM/DL — LOW (ref 32–36)
MCV RBC AUTO: 79.9 FL — LOW (ref 80–100)
MONOCYTES # BLD AUTO: 0.48 K/UL — SIGNIFICANT CHANGE UP (ref 0–0.9)
MONOCYTES NFR BLD AUTO: 12.9 % — SIGNIFICANT CHANGE UP (ref 2–14)
MRSA PCR RESULT.: SIGNIFICANT CHANGE UP
NEUTROPHILS # BLD AUTO: 2.45 K/UL — SIGNIFICANT CHANGE UP (ref 1.8–7.4)
NEUTROPHILS NFR BLD AUTO: 66 % — SIGNIFICANT CHANGE UP (ref 43–77)
NON HDL CHOLESTEROL: 95 MG/DL — SIGNIFICANT CHANGE UP
NRBC # BLD: 0 /100 WBCS — SIGNIFICANT CHANGE UP (ref 0–0)
NRBC # FLD: 0 K/UL — SIGNIFICANT CHANGE UP (ref 0–0)
PLATELET # BLD AUTO: 34 K/UL — LOW (ref 150–400)
POTASSIUM SERPL-MCNC: 3.4 MMOL/L — LOW (ref 3.5–5.3)
POTASSIUM SERPL-SCNC: 3.4 MMOL/L — LOW (ref 3.5–5.3)
PROT SERPL-MCNC: 7.9 G/DL — SIGNIFICANT CHANGE UP (ref 6–8.3)
RBC # BLD: 3.83 M/UL — LOW (ref 4.2–5.8)
RBC # FLD: 21.4 % — HIGH (ref 10.3–14.5)
S AUREUS DNA NOSE QL NAA+PROBE: DETECTED
SODIUM SERPL-SCNC: 131 MMOL/L — LOW (ref 135–145)
TIBC SERPL-MCNC: 271 UG/DL — SIGNIFICANT CHANGE UP (ref 220–430)
TRIGL SERPL-MCNC: 70 MG/DL — SIGNIFICANT CHANGE UP
UIBC SERPL-MCNC: 172 UG/DL — SIGNIFICANT CHANGE UP (ref 110–370)
WBC # BLD: 3.71 K/UL — LOW (ref 3.8–10.5)
WBC # FLD AUTO: 3.71 K/UL — LOW (ref 3.8–10.5)

## 2023-06-21 PROCEDURE — 93971 EXTREMITY STUDY: CPT | Mod: 26

## 2023-06-21 PROCEDURE — 99232 SBSQ HOSP IP/OBS MODERATE 35: CPT

## 2023-06-21 RX ORDER — MAGNESIUM SULFATE 500 MG/ML
2 VIAL (ML) INJECTION ONCE
Refills: 0 | Status: COMPLETED | OUTPATIENT
Start: 2023-06-21 | End: 2023-06-21

## 2023-06-21 RX ORDER — POTASSIUM CHLORIDE 20 MEQ
40 PACKET (EA) ORAL ONCE
Refills: 0 | Status: COMPLETED | OUTPATIENT
Start: 2023-06-21 | End: 2023-06-21

## 2023-06-21 RX ORDER — SODIUM CHLORIDE 9 MG/ML
1000 INJECTION, SOLUTION INTRAVENOUS
Refills: 0 | Status: DISCONTINUED | OUTPATIENT
Start: 2023-06-21 | End: 2023-06-24

## 2023-06-21 RX ORDER — KETOROLAC TROMETHAMINE 30 MG/ML
15 SYRINGE (ML) INJECTION ONCE
Refills: 0 | Status: DISCONTINUED | OUTPATIENT
Start: 2023-06-21 | End: 2023-06-21

## 2023-06-21 RX ORDER — MUPIROCIN 20 MG/G
1 OINTMENT TOPICAL
Refills: 0 | Status: DISCONTINUED | OUTPATIENT
Start: 2023-06-21 | End: 2023-06-25

## 2023-06-21 RX ADMIN — Medication 30 MILLILITER(S): at 11:47

## 2023-06-21 RX ADMIN — SODIUM CHLORIDE 75 MILLILITER(S): 9 INJECTION, SOLUTION INTRAVENOUS at 17:44

## 2023-06-21 RX ADMIN — Medication 15 MILLIGRAM(S): at 11:47

## 2023-06-21 RX ADMIN — Medication 1 MILLIGRAM(S): at 11:48

## 2023-06-21 RX ADMIN — MUPIROCIN 1 APPLICATION(S): 20 OINTMENT TOPICAL at 17:44

## 2023-06-21 RX ADMIN — Medication 105 MILLIGRAM(S): at 11:56

## 2023-06-21 RX ADMIN — Medication 1 TABLET(S): at 11:48

## 2023-06-21 RX ADMIN — Medication 40 MILLIEQUIVALENT(S): at 11:47

## 2023-06-21 RX ADMIN — Medication 105 MILLIGRAM(S): at 19:52

## 2023-06-21 RX ADMIN — Medication 105 MILLIGRAM(S): at 02:49

## 2023-06-21 RX ADMIN — Medication 25 GRAM(S): at 11:48

## 2023-06-21 RX ADMIN — PANTOPRAZOLE SODIUM 40 MILLIGRAM(S): 20 TABLET, DELAYED RELEASE ORAL at 05:24

## 2023-06-21 NOTE — PATIENT PROFILE ADULT - FALL HARM RISK - HARM RISK INTERVENTIONS
Assistance with ambulation/Assistance OOB with selected safe patient handling equipment/Communicate Risk of Fall with Harm to all staff/Discuss with provider need for PT consult/Monitor for mental status changes/Monitor gait and stability/Provide patient with walking aids - walker, cane, crutches/Reinforce activity limits and safety measures with patient and family/Tailored Fall Risk Interventions/Toileting schedule using arm’s reach rule for commode and bathroom/Use of alarms - bed, chair and/or voice tab/Visual Cue: Yellow wristband and red socks/Bed in lowest position, wheels locked, appropriate side rails in place/Call bell, personal items and telephone in reach/Instruct patient to call for assistance before getting out of bed or chair/Non-slip footwear when patient is out of bed/Jacob to call system/Physically safe environment - no spills, clutter or unnecessary equipment/Purposeful Proactive Rounding/Room/bathroom lighting operational, light cord in reach

## 2023-06-21 NOTE — PATIENT PROFILE ADULT - NSTOBACCONEVERSMOKERY/N_GEN_A
[Patient Optimized for Surgery] : Patient optimized for surgery [As per surgery] : as per surgery Yes

## 2023-06-21 NOTE — PATIENT PROFILE ADULT - MST SCORE
[facility] [facility] radiology (10 Almas 0

## 2023-06-21 NOTE — PATIENT PROFILE ADULT - TOBACCO USE
Routing refill request to provider for review/approval because:  Pt takes for insomnia, however his last PHQ-9 score was > 5. He has no dx depression or anxiety on Problem List.    PHQ 8/2/2018 11/11/2019   PHQ-9 Total Score 10 16   Q9: Thoughts of better off dead/self-harm past 2 weeks Not at all Not at all     MAGALIE-7 SCORE 11/11/2019   Total Score 15     '  Angelina HDZ, RN, BSN       Light tobacco smoker

## 2023-06-22 ENCOUNTER — TRANSCRIPTION ENCOUNTER (OUTPATIENT)
Age: 33
End: 2023-06-22

## 2023-06-22 LAB
ALBUMIN SERPL ELPH-MCNC: 3 G/DL — LOW (ref 3.3–5)
ALP SERPL-CCNC: 201 U/L — HIGH (ref 40–120)
ALT FLD-CCNC: 21 U/L — SIGNIFICANT CHANGE UP (ref 4–41)
ANION GAP SERPL CALC-SCNC: 14 MMOL/L — SIGNIFICANT CHANGE UP (ref 7–14)
AST SERPL-CCNC: 99 U/L — HIGH (ref 4–40)
BILIRUB SERPL-MCNC: 1.2 MG/DL — SIGNIFICANT CHANGE UP (ref 0.2–1.2)
BUN SERPL-MCNC: 4 MG/DL — LOW (ref 7–23)
CALCIUM SERPL-MCNC: 8.8 MG/DL — SIGNIFICANT CHANGE UP (ref 8.4–10.5)
CHLORIDE SERPL-SCNC: 99 MMOL/L — SIGNIFICANT CHANGE UP (ref 98–107)
CO2 SERPL-SCNC: 21 MMOL/L — LOW (ref 22–31)
CREAT SERPL-MCNC: 0.45 MG/DL — LOW (ref 0.5–1.3)
EGFR: 143 ML/MIN/1.73M2 — SIGNIFICANT CHANGE UP
GLUCOSE SERPL-MCNC: 88 MG/DL — SIGNIFICANT CHANGE UP (ref 70–99)
HCT VFR BLD CALC: 32.9 % — LOW (ref 39–50)
HGB BLD-MCNC: 9.8 G/DL — LOW (ref 13–17)
MAGNESIUM SERPL-MCNC: 1.8 MG/DL — SIGNIFICANT CHANGE UP (ref 1.6–2.6)
MCHC RBC-ENTMCNC: 24 PG — LOW (ref 27–34)
MCHC RBC-ENTMCNC: 29.8 GM/DL — LOW (ref 32–36)
MCV RBC AUTO: 80.6 FL — SIGNIFICANT CHANGE UP (ref 80–100)
NRBC # BLD: 0 /100 WBCS — SIGNIFICANT CHANGE UP (ref 0–0)
NRBC # FLD: 0 K/UL — SIGNIFICANT CHANGE UP (ref 0–0)
PHOSPHATE SERPL-MCNC: 2.8 MG/DL — SIGNIFICANT CHANGE UP (ref 2.5–4.5)
PLATELET # BLD AUTO: 42 K/UL — LOW (ref 150–400)
POTASSIUM SERPL-MCNC: 3.7 MMOL/L — SIGNIFICANT CHANGE UP (ref 3.5–5.3)
POTASSIUM SERPL-SCNC: 3.7 MMOL/L — SIGNIFICANT CHANGE UP (ref 3.5–5.3)
PROT SERPL-MCNC: 7.8 G/DL — SIGNIFICANT CHANGE UP (ref 6–8.3)
RBC # BLD: 4.08 M/UL — LOW (ref 4.2–5.8)
RBC # FLD: 21.8 % — HIGH (ref 10.3–14.5)
SODIUM SERPL-SCNC: 134 MMOL/L — LOW (ref 135–145)
WBC # BLD: 5.12 K/UL — SIGNIFICANT CHANGE UP (ref 3.8–10.5)
WBC # FLD AUTO: 5.12 K/UL — SIGNIFICANT CHANGE UP (ref 3.8–10.5)

## 2023-06-22 PROCEDURE — 99232 SBSQ HOSP IP/OBS MODERATE 35: CPT

## 2023-06-22 RX ORDER — KETOROLAC TROMETHAMINE 30 MG/ML
15 SYRINGE (ML) INJECTION ONCE
Refills: 0 | Status: DISCONTINUED | OUTPATIENT
Start: 2023-06-22 | End: 2023-06-22

## 2023-06-22 RX ADMIN — Medication 105 MILLIGRAM(S): at 17:58

## 2023-06-22 RX ADMIN — Medication 1 TABLET(S): at 12:51

## 2023-06-22 RX ADMIN — MUPIROCIN 1 APPLICATION(S): 20 OINTMENT TOPICAL at 17:58

## 2023-06-22 RX ADMIN — PANTOPRAZOLE SODIUM 40 MILLIGRAM(S): 20 TABLET, DELAYED RELEASE ORAL at 05:59

## 2023-06-22 RX ADMIN — Medication 1 MILLIGRAM(S): at 12:51

## 2023-06-22 RX ADMIN — MUPIROCIN 1 APPLICATION(S): 20 OINTMENT TOPICAL at 05:59

## 2023-06-22 RX ADMIN — Medication 105 MILLIGRAM(S): at 03:35

## 2023-06-22 RX ADMIN — Medication 105 MILLIGRAM(S): at 12:51

## 2023-06-22 NOTE — ADVANCED PRACTICE NURSE CONSULT - ASSESSMENT
General: A&Ox4, [place]an speaking, continent of urine and stool. Skin warm, dry with increased moisture in intertriginous folds, adequate skin turgor.    Vascular: Bilateral lower extremities warm to touch. Capillary refill <3 seconds. BLE +3 palpable DP/PT pulses.  RLE +2 Edema with hyperpigmentation, tender to touch    Right lower anterior leg - chronic wound presents as stable nondraining scab surrounded by hypopigmentation and soft tissue contracture. no drainage, no odor. tender to palpation, periwound with edema, no induration, no increased warmth, no fluctuance. Goals of care: maintain stable scab, provide antimicrobial support.     Patient educated on wound care and wound healing, s/s of infection and when to return to ed, recommended covering wound with dry sterile dressing when wearing tight pants to prevent reopening of scab. patient verbalized understanding.

## 2023-06-22 NOTE — DISCHARGE NOTE PROVIDER - NSFOLLOWUPCLINICS_GEN_ALL_ED_FT
[facility] General Internal Medicine  General Internal Medicine  [address] [place] [address]  Phone: [phone]  Fax:

## 2023-06-22 NOTE — DISCHARGE NOTE PROVIDER - NSDCCPCAREPLAN_GEN_ALL_CORE_FT
PRINCIPAL DISCHARGE DIAGNOSIS  Diagnosis: Alcohol intoxication  Assessment and Plan of Treatment: CIWA scores remained stable. No PRNs required. Alcohol cessation advised.      SECONDARY DISCHARGE DIAGNOSES  Diagnosis: Transaminitis  Assessment and Plan of Treatment: Due to alcohol consumption. Alcohol cessation advised    Diagnosis: Hematoma  Assessment and Plan of Treatment: No intracranial hemorrhage noted on CT    Diagnosis: Pancytopenia  Assessment and Plan of Treatment: Due to alcohol consumption. Alcohol cessation advised     PRINCIPAL DISCHARGE DIAGNOSIS  Diagnosis: Alcohol intoxication  Assessment and Plan of Treatment: CIWA scores remained stable. No PRNs required. Alcohol cessation advised.      SECONDARY DISCHARGE DIAGNOSES  Diagnosis: Pancytopenia  Assessment and Plan of Treatment: Due to alcohol consumption. Alcohol cessation advised    Diagnosis: Transaminitis  Assessment and Plan of Treatment: Due to alcohol consumption. Alcohol cessation advised    Diagnosis: Hematoma  Assessment and Plan of Treatment: No intracranial hemorrhage noted on CT

## 2023-06-22 NOTE — DISCHARGE NOTE PROVIDER - NSDCMRMEDTOKEN_GEN_ALL_CORE_FT
ferrous sulfate 325 mg (65 mg elemental iron) oral tablet: 1 tab(s) orally 3 times a week every Mon/Wed/Fri  folic acid 1 mg oral tablet: 1 tab(s) orally once a day  Multiple Vitamins oral tablet: 1 tab(s) orally once a day  mupirocin 2% topical ointment: 1 application topically 2 times a day  pantoprazole 40 mg oral delayed release tablet: 1 tab(s) orally once a day (before a meal)  thiamine 100 mg oral tablet: 1 tab(s) orally once a day   mupirocin 2% topical ointment: Apply topically to affected area once a day  pantoprazole 40 mg oral delayed release tablet: 1 tab(s) orally once a day (before a meal)

## 2023-06-22 NOTE — DISCHARGE NOTE PROVIDER - HOSPITAL COURSE
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        Problem/Plan - 1:  ·  Problem: Hematoma.   ·  Plan: CT head showed small right occipital scalp hematoma  no intracranial hemorrhage   -Most likely due to trauma        Problem/Plan - 2:  ·  Problem: Alcohol intoxication.   ·  Plan: Patient with alcohol intoxication, found to have alcohol level of 327   CIWA protocol with symptom trigger ativan. Most recent CIWA 2  alcohol cessation advised      Problem/Plan - 3:  ·  Problem: Pancytopenia.   ·  Plan: Due to alcohol consumption   -No signs of active bleeding. CT head showed small R occipital hematoma   -Hgb stable. Platelet stable in 40s      Problem/Plan - 4:  ·  Problem: Transaminitis.   ·  Plan: Due to alcohol consumption        Problem/Plan - 5:  ·  Problem: Leg swelling.   ·  Plan: Has chronic RLE swelling and wound   -No signs of active infection or cellulitis   -VA duplex LE without dvt  -wound care consult.    Patient stable for discharge.    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        Problem/Plan - 1:  ·  Problem: Hematoma.   ·  Plan: CT head showed small right occipital scalp hematoma  no intracranial hemorrhage   -Most likely due to trauma        Problem/Plan - 2:  ·  Problem: Alcohol intoxication.   ·  Plan: Patient with alcohol intoxication, found to have alcohol level of 327   CIWA protocol with symptom trigger ativan. Most recent CIWA 2  alcohol cessation advised   No PRNs required, detox completed     Problem/Plan - 3:  ·  Problem: Pancytopenia.   ·  Plan: Due to alcohol consumption   -No signs of active bleeding. CT head showed small R occipital hematoma   -Hgb stable. Platelet stable in 40s      Problem/Plan - 4:  ·  Problem: Transaminitis.   ·  Plan: Due to alcohol consumption        Problem/Plan - 5:  ·  Problem: Leg swelling.   ·  Plan: Has chronic RLE swelling and wound   -No signs of active infection or cellulitis   -VA duplex LE without dvt  -wound care consult.    Chest derek  EKG normal and trops<6  possible GERD related.     Patient stable for discharge.    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     Hospital course:  Hematoma.   CT head showed small right occipital scalp hematoma  no intracranial hemorrhage   -Most likely due to trauma     Alcohol intoxication.   Patient with alcohol intoxication, found to have alcohol level of 327   CIWA protocol with symptom trigger ativan. Most recent CIWA 0  alcohol cessation advised   No PRNs required, detox completed    Pancytopenia.   Due to alcohol consumption   No signs of active bleeding. CT head showed small R occipital hematoma   Hgb stable. Platelet stable in 40s     Transaminitis.   Due to alcohol consumption     Leg swelling.   Has chronic RLE swelling and wound   No signs of active infection or cellulitis   VA duplex LE without dvt  wound care consult.    Chest pain  EKG normal and trops<6  possible GERD related.     Case discussed with Dr Talamantes on 6/25 patient stable for discharge.

## 2023-06-22 NOTE — ADVANCED PRACTICE NURSE CONSULT - RECOMMEDATIONS
Follow up with PCP or [facility] ([phone], [address] [place] [address]) for chronic RLE wound.     Topical recommendations:     RLE - paint with betadine daily, allow to dry.     plan discussed with patient and primary RN Shey Cabrera.  Please contact Wound/Ostomy Care Service Line if we can be of further assistance (ext [phone]). Please reconsult if changes to tissue type noted.

## 2023-06-22 NOTE — ADVANCED PRACTICE NURSE CONSULT - REASON FOR CONSULT
Patient seen on skin care rounds after wound care referral received for assessment of skin impairment and recommendations of topical management of chronic RLE swelling and wound. 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 Chart reviewed: WBC 5.12, H/H 9.8/32.9, INR 1.27, Platelets 42,  BMI 33.5, farrah 19. BLE duplex negative for DVT Patient reports he has had nonhealing RLE wound for 2 years 2/2 trauma.     utilized for interview id #[id number]

## 2023-06-22 NOTE — ADVANCED PRACTICE NURSE CONSULT - APN SPECIALTY LIST
Wound Ostomy Care
Detail Level: Zone
Comment: No active lesions today. Does have residual pie/pih.  Discussed expectations with no lesions over the last few months and appearance today. Will have the patient finish her last few days of medication and she will be done with her course of isotretinoin. Discussed long term expectations and discussed need to avoid procedures for scarring for 6 months

## 2023-06-22 NOTE — DISCHARGE NOTE PROVIDER - NSDCCPTREATMENT_GEN_ALL_CORE_FT
PRINCIPAL PROCEDURE  Procedure: CT head  Findings and Treatment:   < end of copied text >  IMPRESSION:  Small right occipital scalp hematoma. No acute intracranial hemorrhage,   extra-axial collection available fracture.  Inflammatory paranasal sinus disease.< from: CT Head No Cont (06.20.23 @ 02:18) >

## 2023-06-22 NOTE — DISCHARGE NOTE PROVIDER - ATTENDING DISCHARGE PHYSICAL EXAMINATION:
.  VITAL SIGNS:  T(C): 37 (06-25-23 @ 08:00), Max: 37.2 (06-25-23 @ 04:08)  T(F): 98.6 (06-25-23 @ 08:00), Max: 98.9 (06-25-23 @ 04:08)  HR: 65 (06-25-23 @ 08:00) (65 - 93)  BP: 110/66 (06-25-23 @ 08:00) (106/76 - 115/70)  BP(mean): 85 (06-25-23 @ 04:08) (85 - 85)  RR: 18 (06-25-23 @ 08:00) (17 - 18)  SpO2: 100% (06-25-23 @ 08:00) (94% - 100%)  Wt(kg): --    PHYSICAL EXAM:    Constitutional: Comfortable - resting comfortably in bed; NAD  Head: NC/AT  Eyes: PERRL, EOMI, anicteric sclera  ENT: no nasal discharge; uvula midline, no oropharyngeal erythema or exudates; MMM  Neck: supple; no JVD or thyromegaly  Respiratory: CTA B/L; no W/R/R, no retractions  Cardiac: +S1/S2; RRR; no M/R/G; PMI non-displaced  Gastrointestinal: abdomen soft, NT/ND; no rebound or guarding; +BSx4  Extremities: WWP, no clubbing or cyanosis; no peripheral edema  Musculoskeletal: NROM x4; no joint swelling, tenderness or erythema  Vascular: 2+ radial, femoral, DP/PT pulses B/L  Dermatologic: skin warm, dry and intact; no rashes, wounds, or scars  Neurologic: AAOx3; CNII-XII grossly intact; no focal deficits  Psychiatric: affect and characteristics of appearance, verbalizations, behaviors are appropriate

## 2023-06-23 LAB
ALBUMIN SERPL ELPH-MCNC: 3.1 G/DL — LOW (ref 3.3–5)
ALP SERPL-CCNC: 192 U/L — HIGH (ref 40–120)
ALT FLD-CCNC: 22 U/L — SIGNIFICANT CHANGE UP (ref 4–41)
ANION GAP SERPL CALC-SCNC: 12 MMOL/L — SIGNIFICANT CHANGE UP (ref 7–14)
AST SERPL-CCNC: 82 U/L — HIGH (ref 4–40)
BILIRUB SERPL-MCNC: 1.1 MG/DL — SIGNIFICANT CHANGE UP (ref 0.2–1.2)
BUN SERPL-MCNC: 6 MG/DL — LOW (ref 7–23)
CALCIUM SERPL-MCNC: 9.2 MG/DL — SIGNIFICANT CHANGE UP (ref 8.4–10.5)
CHLORIDE SERPL-SCNC: 101 MMOL/L — SIGNIFICANT CHANGE UP (ref 98–107)
CO2 SERPL-SCNC: 21 MMOL/L — LOW (ref 22–31)
CREAT SERPL-MCNC: 0.5 MG/DL — SIGNIFICANT CHANGE UP (ref 0.5–1.3)
EGFR: 139 ML/MIN/1.73M2 — SIGNIFICANT CHANGE UP
GLUCOSE SERPL-MCNC: 86 MG/DL — SIGNIFICANT CHANGE UP (ref 70–99)
HCT VFR BLD CALC: 34.3 % — LOW (ref 39–50)
HGB BLD-MCNC: 10.2 G/DL — LOW (ref 13–17)
MAGNESIUM SERPL-MCNC: 1.7 MG/DL — SIGNIFICANT CHANGE UP (ref 1.6–2.6)
MCHC RBC-ENTMCNC: 24.1 PG — LOW (ref 27–34)
MCHC RBC-ENTMCNC: 29.7 GM/DL — LOW (ref 32–36)
MCV RBC AUTO: 80.9 FL — SIGNIFICANT CHANGE UP (ref 80–100)
NRBC # BLD: 0 /100 WBCS — SIGNIFICANT CHANGE UP (ref 0–0)
NRBC # FLD: 0 K/UL — SIGNIFICANT CHANGE UP (ref 0–0)
PHOSPHATE SERPL-MCNC: 3.7 MG/DL — SIGNIFICANT CHANGE UP (ref 2.5–4.5)
PLATELET # BLD AUTO: 58 K/UL — LOW (ref 150–400)
POTASSIUM SERPL-MCNC: 3.9 MMOL/L — SIGNIFICANT CHANGE UP (ref 3.5–5.3)
POTASSIUM SERPL-SCNC: 3.9 MMOL/L — SIGNIFICANT CHANGE UP (ref 3.5–5.3)
PROT SERPL-MCNC: 7.9 G/DL — SIGNIFICANT CHANGE UP (ref 6–8.3)
RBC # BLD: 4.24 M/UL — SIGNIFICANT CHANGE UP (ref 4.2–5.8)
RBC # FLD: 22.1 % — HIGH (ref 10.3–14.5)
SODIUM SERPL-SCNC: 134 MMOL/L — LOW (ref 135–145)
WBC # BLD: 5.63 K/UL — SIGNIFICANT CHANGE UP (ref 3.8–10.5)
WBC # FLD AUTO: 5.63 K/UL — SIGNIFICANT CHANGE UP (ref 3.8–10.5)

## 2023-06-23 PROCEDURE — 99232 SBSQ HOSP IP/OBS MODERATE 35: CPT

## 2023-06-23 RX ORDER — CHLORHEXIDINE GLUCONATE 213 G/1000ML
1 SOLUTION TOPICAL DAILY
Refills: 0 | Status: DISCONTINUED | OUTPATIENT
Start: 2023-06-23 | End: 2023-06-25

## 2023-06-23 RX ADMIN — Medication 105 MILLIGRAM(S): at 02:15

## 2023-06-23 RX ADMIN — Medication 30 MILLILITER(S): at 10:27

## 2023-06-23 RX ADMIN — Medication 1 MILLIGRAM(S): at 12:33

## 2023-06-23 RX ADMIN — Medication 105 MILLIGRAM(S): at 12:32

## 2023-06-23 RX ADMIN — Medication 1 TABLET(S): at 12:32

## 2023-06-23 RX ADMIN — MUPIROCIN 1 APPLICATION(S): 20 OINTMENT TOPICAL at 05:16

## 2023-06-23 RX ADMIN — CHLORHEXIDINE GLUCONATE 1 APPLICATION(S): 213 SOLUTION TOPICAL at 17:36

## 2023-06-23 RX ADMIN — MUPIROCIN 1 APPLICATION(S): 20 OINTMENT TOPICAL at 17:36

## 2023-06-23 RX ADMIN — PANTOPRAZOLE SODIUM 40 MILLIGRAM(S): 20 TABLET, DELAYED RELEASE ORAL at 05:17

## 2023-06-24 DIAGNOSIS — R07.9 CHEST PAIN, UNSPECIFIED: ICD-10-CM

## 2023-06-24 LAB
ALBUMIN SERPL ELPH-MCNC: 3.1 G/DL — LOW (ref 3.3–5)
ALP SERPL-CCNC: 178 U/L — HIGH (ref 40–120)
ALT FLD-CCNC: 23 U/L — SIGNIFICANT CHANGE UP (ref 4–41)
ANION GAP SERPL CALC-SCNC: 11 MMOL/L — SIGNIFICANT CHANGE UP (ref 7–14)
AST SERPL-CCNC: 74 U/L — HIGH (ref 4–40)
BILIRUB SERPL-MCNC: 0.9 MG/DL — SIGNIFICANT CHANGE UP (ref 0.2–1.2)
BUN SERPL-MCNC: 10 MG/DL — SIGNIFICANT CHANGE UP (ref 7–23)
CALCIUM SERPL-MCNC: 8.8 MG/DL — SIGNIFICANT CHANGE UP (ref 8.4–10.5)
CHLORIDE SERPL-SCNC: 101 MMOL/L — SIGNIFICANT CHANGE UP (ref 98–107)
CO2 SERPL-SCNC: 19 MMOL/L — LOW (ref 22–31)
CREAT SERPL-MCNC: 0.5 MG/DL — SIGNIFICANT CHANGE UP (ref 0.5–1.3)
EGFR: 139 ML/MIN/1.73M2 — SIGNIFICANT CHANGE UP
GLUCOSE SERPL-MCNC: 101 MG/DL — HIGH (ref 70–99)
HCT VFR BLD CALC: 33.4 % — LOW (ref 39–50)
HGB BLD-MCNC: 10.3 G/DL — LOW (ref 13–17)
MAGNESIUM SERPL-MCNC: 1.8 MG/DL — SIGNIFICANT CHANGE UP (ref 1.6–2.6)
MCHC RBC-ENTMCNC: 25.1 PG — LOW (ref 27–34)
MCHC RBC-ENTMCNC: 30.8 GM/DL — LOW (ref 32–36)
MCV RBC AUTO: 81.3 FL — SIGNIFICANT CHANGE UP (ref 80–100)
NRBC # BLD: 0 /100 WBCS — SIGNIFICANT CHANGE UP (ref 0–0)
NRBC # FLD: 0 K/UL — SIGNIFICANT CHANGE UP (ref 0–0)
PHOSPHATE SERPL-MCNC: 4.3 MG/DL — SIGNIFICANT CHANGE UP (ref 2.5–4.5)
PLATELET # BLD AUTO: 67 K/UL — LOW (ref 150–400)
POTASSIUM SERPL-MCNC: 3.8 MMOL/L — SIGNIFICANT CHANGE UP (ref 3.5–5.3)
POTASSIUM SERPL-SCNC: 3.8 MMOL/L — SIGNIFICANT CHANGE UP (ref 3.5–5.3)
PROT SERPL-MCNC: 7.9 G/DL — SIGNIFICANT CHANGE UP (ref 6–8.3)
RBC # BLD: 4.11 M/UL — LOW (ref 4.2–5.8)
RBC # FLD: 22.5 % — HIGH (ref 10.3–14.5)
SODIUM SERPL-SCNC: 131 MMOL/L — LOW (ref 135–145)
TROPONIN T, HIGH SENSITIVITY RESULT: <6 NG/L — SIGNIFICANT CHANGE UP
WBC # BLD: 4.99 K/UL — SIGNIFICANT CHANGE UP (ref 3.8–10.5)
WBC # FLD AUTO: 4.99 K/UL — SIGNIFICANT CHANGE UP (ref 3.8–10.5)

## 2023-06-24 PROCEDURE — 99232 SBSQ HOSP IP/OBS MODERATE 35: CPT

## 2023-06-24 PROCEDURE — 93010 ELECTROCARDIOGRAM REPORT: CPT

## 2023-06-24 RX ORDER — MUPIROCIN 20 MG/G
1 OINTMENT TOPICAL
Qty: 1 | Refills: 0
Start: 2023-06-24

## 2023-06-24 RX ORDER — PANTOPRAZOLE SODIUM 20 MG/1
1 TABLET, DELAYED RELEASE ORAL
Qty: 30 | Refills: 0
Start: 2023-06-24 | End: 2023-07-23

## 2023-06-24 RX ADMIN — Medication 1 TABLET(S): at 11:21

## 2023-06-24 RX ADMIN — PANTOPRAZOLE SODIUM 40 MILLIGRAM(S): 20 TABLET, DELAYED RELEASE ORAL at 05:00

## 2023-06-24 RX ADMIN — Medication 1 MILLIGRAM(S): at 11:21

## 2023-06-24 RX ADMIN — MUPIROCIN 1 APPLICATION(S): 20 OINTMENT TOPICAL at 19:59

## 2023-06-24 RX ADMIN — MUPIROCIN 1 APPLICATION(S): 20 OINTMENT TOPICAL at 05:00

## 2023-06-24 RX ADMIN — CHLORHEXIDINE GLUCONATE 1 APPLICATION(S): 213 SOLUTION TOPICAL at 11:21

## 2023-06-24 NOTE — PROGRESS NOTE ADULT - PROBLEM SELECTOR PLAN 2
CT head showed small right occipital scalp hematoma  no intracranial hemorrhage   -Most likely due to trauma   -CTM  -pain control
Patient with alcohol intoxication, found to have alcohol level of 327   -CIWA protocol with symptom trigger ativan. Most recent CIWA 2  -C/w folic acid and thiamine
Patient with alcohol intoxication, found to have alcohol level of 327   -CIWA protocol with symptom trigger ativan. Most recent CIWA 2  -C/w folic acid and thiamine
Patient with alcohol intoxication, found to have alcohol level of 327   -CIWA protocol with symptom trigger ativan. Most recent CIWA 2  -C/w folic acid and thiamine   -C/w IVF @75 cc per hr for 12 hrs

## 2023-06-24 NOTE — PROGRESS NOTE ADULT - PROBLEM SELECTOR PLAN 1
-reports intermittent chest pain today  -check EKG  -check trops  -overall suspicion low for ACS, possible GERD related  -c/w PPi
CT head showed small right occipital scalp hematoma  no intracranial hemorrhage   -Most likely due to trauma   -CTM  -pain control

## 2023-06-24 NOTE — PROGRESS NOTE ADULT - PROBLEM SELECTOR PLAN 4
Due to alcohol consumption   -No signs of active bleeding. CT head showed small R occipital hematoma   -Trend CBC daily   -transfuse pRBCs if Hgb <7   -transfuse plts if plts <10, <15 with fever, or <20 with bleeding
Due to alcohol consumption   -Trend CMP daily

## 2023-06-24 NOTE — PROGRESS NOTE ADULT - SUBJECTIVE AND OBJECTIVE BOX
[facility] medicine  Dr. Silvana Gracia  Pager [phone]    Patient is a 33y old  Male who presents with a chief complaint of alcohol intoxication (20 Jun 2023 19:34)    SUBJECTIVE / OVERNIGHT EVENTS: Patient seen and examined. Reports headache is better. Reports chest pain after eating. Suspect acid reflux. Maalox offered. Patient is homeless but refusing shelter.     MEDICATIONS  (STANDING):  folic acid 1 milliGRAM(s) Oral daily  lactated ringers. 1000 milliLiter(s) (75 mL/Hr) IV Continuous <Continuous>  multivitamin 1 Tablet(s) Oral daily  pantoprazole    Tablet 40 milliGRAM(s) Oral before breakfast  thiamine IVPB 500 milliGRAM(s) IV Intermittent every 8 hours    MEDICATIONS  (PRN):  aluminum hydroxide/magnesium hydroxide/simethicone Suspension 30 milliLiter(s) Oral every 4 hours PRN Dyspepsia  LORazepam     Tablet 2 milliGRAM(s) Oral every 2 hours PRN CIWA-Ar score increase by 2 points and a total score of 7 or less  LORazepam     Tablet 2 milliGRAM(s) Oral every 1 hour PRN CIWA-Ar score 8 or greater  melatonin 3 milliGRAM(s) Oral at bedtime PRN Insomnia  ondansetron Injectable 4 milliGRAM(s) IV Push every 8 hours PRN Nausea and/or Vomiting    CAPILLARY BLOOD GLUCOSE    Vital Signs Last 24 Hrs  T(C): 36.8 (23 Jun 2023 07:40), Max: 37.2 (22 Jun 2023 23:44)  T(F): 98.3 (23 Jun 2023 07:40), Max: 98.9 (22 Jun 2023 23:44)  HR: 90 (23 Jun 2023 07:40) (76 - 93)  BP: 96/60 (23 Jun 2023 07:40) (96/60 - 131/69)  BP(mean): --  RR: 18 (23 Jun 2023 07:40) (17 - 18)  SpO2: 98% (23 Jun 2023 07:40) (97% - 99%)    Parameters below as of 23 Jun 2023 07:40  Patient On (Oxygen Delivery Method): room air      PHYSICAL EXAM:  GENERAL: NAD, well-developed  HEAD:  Atraumatic, Normocephalic  EYES: EOMI, PERRLA, conjunctiva and sclera clear  NECK: Supple, No JVD  CHEST/LUNG: Clear to auscultation bilaterally; No wheeze  HEART: Regular rate and rhythm; No murmurs, rubs, or gallops  ABDOMEN: Soft, Nontender, Nondistended; Bowel sounds present  EXTREMITIES: RLE swelling, chronic venous changes, chronic appearing wound on anterior leg  PSYCH: AAOx3  NEUROLOGY: non-focal  SKIN: No rashes or lesions    LABS:                                              10.2   5.63  )-----------( 58       ( 23 Jun 2023 05:21 )             34.3   06-23    134<L>  |  101  |  6<L>  ----------------------------<  86  3.9   |  21<L>  |  0.50    Ca    9.2      23 Jun 2023 05:21  Phos  3.7     06-23  Mg     1.70     06-23    TPro  7.9  /  Alb  3.1<L>  /  TBili  1.1  /  DBili  x   /  AST  82<H>  /  ALT  22  /  AlkPhos  192<H>  06-23        Urinalysis Basic - ( 21 Jun 2023 06:00 )    Color: x / Appearance: x / SG: x / pH: x  Gluc: 70 mg/dL / Ketone: x  / Bili: x / Urobili: x   Blood: x / Protein: x / Nitrite: x   Leuk Esterase: x / RBC: x / WBC x   Sq Epi: x / Non Sq Epi: x / Bacteria: x        RADIOLOGY & ADDITIONAL TESTS: < from: VA Duplex Ext Veins Lower Comp, Right. (VA Duplex Ext Veins Lower Comp, Bilat.) (06.21.23 @ 06:14) >  Summary/Impressions:  No evidence of deep vein thrombosis in the right lower  extremity.  No evidence of deep and superficial venous insufficiency  noted in the right lower extremity.  Subcutaneous edema notedat the right calf and ankle.    < end of copied text >      Imaging Personally Reviewed:    Consultant(s) Notes Reviewed:      Care Discussed with Consultants/Other Providers:    Assessment and Plan:
[facility] medicine  Dr. Silvana Gracia  Pager [phone]    Patient is a 33y old  Male who presents with a chief complaint of alcohol intoxication (20 Jun 2023 19:34)    SUBJECTIVE / OVERNIGHT EVENTS: Patient seen and examined. Reports headache and right foot pain. States he drank 12 beers yesterday. Denies h/o seizures.     MEDICATIONS  (STANDING):  folic acid 1 milliGRAM(s) Oral daily  lactated ringers. 1000 milliLiter(s) (75 mL/Hr) IV Continuous <Continuous>  multivitamin 1 Tablet(s) Oral daily  pantoprazole    Tablet 40 milliGRAM(s) Oral before breakfast  thiamine IVPB 500 milliGRAM(s) IV Intermittent every 8 hours    MEDICATIONS  (PRN):  aluminum hydroxide/magnesium hydroxide/simethicone Suspension 30 milliLiter(s) Oral every 4 hours PRN Dyspepsia  LORazepam     Tablet 2 milliGRAM(s) Oral every 2 hours PRN CIWA-Ar score increase by 2 points and a total score of 7 or less  LORazepam     Tablet 2 milliGRAM(s) Oral every 1 hour PRN CIWA-Ar score 8 or greater  melatonin 3 milliGRAM(s) Oral at bedtime PRN Insomnia  ondansetron Injectable 4 milliGRAM(s) IV Push every 8 hours PRN Nausea and/or Vomiting    CAPILLARY BLOOD GLUCOSE  POCT Blood Glucose.: 75 mg/dL (20 Jun 2023 19:01)    Vital Signs Last 24 Hrs  T(C): 37.1 (21 Jun 2023 11:56), Max: 37.7 (20 Jun 2023 17:48)  T(F): 98.7 (21 Jun 2023 11:56), Max: 99.8 (20 Jun 2023 17:48)  HR: 89 (21 Jun 2023 11:56) (84 - 102)  BP: 118/72 (21 Jun 2023 11:56) (115/77 - 129/80)  BP(mean): --  RR: 18 (21 Jun 2023 11:56) (16 - 18)  SpO2: 98% (21 Jun 2023 11:56) (97% - 100%)    Parameters below as of 21 Jun 2023 11:56  Patient On (Oxygen Delivery Method): room air      PHYSICAL EXAM:  GENERAL: NAD, well-developed  HEAD:  Atraumatic, Normocephalic  EYES: EOMI, PERRLA, conjunctiva and sclera clear  NECK: Supple, No JVD  CHEST/LUNG: Clear to auscultation bilaterally; No wheeze  HEART: Regular rate and rhythm; No murmurs, rubs, or gallops  ABDOMEN: Soft, Nontender, Nondistended; Bowel sounds present  EXTREMITIES: RLE swelling, chronic venous changes, chronic appearing wound on anterior leg  PSYCH: AAOx3  NEUROLOGY: non-focal  SKIN: No rashes or lesions    LABS:                        9.4    3.71  )-----------( 34       ( 21 Jun 2023 06:00 )             30.6     06-21    131<L>  |  96<L>  |  4<L>  ----------------------------<  70  3.4<L>   |  23  |  0.37<L>    Ca    8.5      21 Jun 2023 06:00  Mg     1.40     06-21    TPro  7.9  /  Alb  2.9<L>  /  TBili  1.5<H>  /  DBili  x   /  AST  72<H>  /  ALT  15  /  AlkPhos  172<H>  06-21    PT/INR - ( 20 Jun 2023 02:45 )   PT: 14.8 sec;   INR: 1.27 ratio         PTT - ( 20 Jun 2023 02:45 )  PTT:39.2 sec      Urinalysis Basic - ( 21 Jun 2023 06:00 )    Color: x / Appearance: x / SG: x / pH: x  Gluc: 70 mg/dL / Ketone: x  / Bili: x / Urobili: x   Blood: x / Protein: x / Nitrite: x   Leuk Esterase: x / RBC: x / WBC x   Sq Epi: x / Non Sq Epi: x / Bacteria: x        RADIOLOGY & ADDITIONAL TESTS: < from: VA Duplex Ext Veins Lower Comp, Right. (VA Duplex Ext Veins Lower Comp, Bilat.) (06.21.23 @ 06:14) >  Summary/Impressions:  No evidence of deep vein thrombosis in the right lower  extremity.  No evidence of deep and superficial venous insufficiency  noted in the right lower extremity.  Subcutaneous edema notedat the right calf and ankle.    < end of copied text >      Imaging Personally Reviewed:    Consultant(s) Notes Reviewed:      Care Discussed with Consultants/Other Providers:    Assessment and Plan:
[facility] medicine  Dr. Silvana Gracia  Pager [phone]    Patient is a 33y old  Male who presents with a chief complaint of alcohol intoxication (20 Jun 2023 19:34)    SUBJECTIVE / OVERNIGHT EVENTS: Patient seen and examined. Reports headache is better. Feels overall better. Still with mild shaking of outstretched hands    MEDICATIONS  (STANDING):  folic acid 1 milliGRAM(s) Oral daily  lactated ringers. 1000 milliLiter(s) (75 mL/Hr) IV Continuous <Continuous>  multivitamin 1 Tablet(s) Oral daily  pantoprazole    Tablet 40 milliGRAM(s) Oral before breakfast  thiamine IVPB 500 milliGRAM(s) IV Intermittent every 8 hours    MEDICATIONS  (PRN):  aluminum hydroxide/magnesium hydroxide/simethicone Suspension 30 milliLiter(s) Oral every 4 hours PRN Dyspepsia  LORazepam     Tablet 2 milliGRAM(s) Oral every 2 hours PRN CIWA-Ar score increase by 2 points and a total score of 7 or less  LORazepam     Tablet 2 milliGRAM(s) Oral every 1 hour PRN CIWA-Ar score 8 or greater  melatonin 3 milliGRAM(s) Oral at bedtime PRN Insomnia  ondansetron Injectable 4 milliGRAM(s) IV Push every 8 hours PRN Nausea and/or Vomiting    CAPILLARY BLOOD GLUCOSE      Vital Signs Last 24 Hrs  T(C): 37.1 (22 Jun 2023 12:16), Max: 37.1 (22 Jun 2023 12:16)  T(F): 98.7 (22 Jun 2023 12:16), Max: 98.7 (22 Jun 2023 12:16)  HR: 93 (22 Jun 2023 12:16) (81 - 93)  BP: 115/72 (22 Jun 2023 12:16) (115/72 - 130/82)  BP(mean): --  RR: 18 (22 Jun 2023 12:16) (17 - 18)  SpO2: 98% (22 Jun 2023 12:16) (98% - 99%)    Parameters below as of 22 Jun 2023 07:30  Patient On (Oxygen Delivery Method): room air          PHYSICAL EXAM:  GENERAL: NAD, well-developed  HEAD:  Atraumatic, Normocephalic  EYES: EOMI, PERRLA, conjunctiva and sclera clear  NECK: Supple, No JVD  CHEST/LUNG: Clear to auscultation bilaterally; No wheeze  HEART: Regular rate and rhythm; No murmurs, rubs, or gallops  ABDOMEN: Soft, Nontender, Nondistended; Bowel sounds present  EXTREMITIES: RLE swelling, chronic venous changes, chronic appearing wound on anterior leg  PSYCH: AAOx3  NEUROLOGY: non-focal  SKIN: No rashes or lesions    LABS:                                   9.8    5.12  )-----------( 42       ( 22 Jun 2023 06:00 )             32.9   06-22    134<L>  |  99  |  4<L>  ----------------------------<  88  3.7   |  21<L>  |  0.45<L>    Ca    8.8      22 Jun 2023 06:00  Phos  2.8     06-22  Mg     1.80     06-22    TPro  7.8  /  Alb  3.0<L>  /  TBili  1.2  /  DBili  x   /  AST  99<H>  /  ALT  21  /  AlkPhos  201<H>  06-22        Urinalysis Basic - ( 21 Jun 2023 06:00 )    Color: x / Appearance: x / SG: x / pH: x  Gluc: 70 mg/dL / Ketone: x  / Bili: x / Urobili: x   Blood: x / Protein: x / Nitrite: x   Leuk Esterase: x / RBC: x / WBC x   Sq Epi: x / Non Sq Epi: x / Bacteria: x        RADIOLOGY & ADDITIONAL TESTS: < from: VA Duplex Ext Veins Lower Comp, Right. (VA Duplex Ext Veins Lower Comp, Bilat.) (06.21.23 @ 06:14) >  Summary/Impressions:  No evidence of deep vein thrombosis in the right lower  extremity.  No evidence of deep and superficial venous insufficiency  noted in the right lower extremity.  Subcutaneous edema notedat the right calf and ankle.    < end of copied text >      Imaging Personally Reviewed:    Consultant(s) Notes Reviewed:      Care Discussed with Consultants/Other Providers:    Assessment and Plan:
[facility] medicine  Dr. Silvana Gracia  Pager [phone]    Patient is a 33y old  Male who presents with a chief complaint of alcohol intoxication (20 Jun 2023 19:34)    SUBJECTIVE / OVERNIGHT EVENTS: Patient seen and examined.  used to obtain history. Patient reports not feeling well today. Reports he has chest pain that comes and goes. Denies related to PO intake. Does not have PCP or insurance. Does not have a pharmacy that he uses. Discussed dispo planning and he is refusing shelter. Does not have insurance for other facilities.     MEDICATIONS  (STANDING):  folic acid 1 milliGRAM(s) Oral daily  lactated ringers. 1000 milliLiter(s) (75 mL/Hr) IV Continuous <Continuous>  multivitamin 1 Tablet(s) Oral daily  pantoprazole    Tablet 40 milliGRAM(s) Oral before breakfast  thiamine IVPB 500 milliGRAM(s) IV Intermittent every 8 hours    MEDICATIONS  (PRN):  aluminum hydroxide/magnesium hydroxide/simethicone Suspension 30 milliLiter(s) Oral every 4 hours PRN Dyspepsia  LORazepam     Tablet 2 milliGRAM(s) Oral every 2 hours PRN CIWA-Ar score increase by 2 points and a total score of 7 or less  LORazepam     Tablet 2 milliGRAM(s) Oral every 1 hour PRN CIWA-Ar score 8 or greater  melatonin 3 milliGRAM(s) Oral at bedtime PRN Insomnia  ondansetron Injectable 4 milliGRAM(s) IV Push every 8 hours PRN Nausea and/or Vomiting    CAPILLARY BLOOD GLUCOSE    Vital Signs Last 24 Hrs  T(C): 37.1 (24 Jun 2023 08:00), Max: 37.1 (23 Jun 2023 11:35)  T(F): 98.7 (24 Jun 2023 08:00), Max: 98.7 (23 Jun 2023 11:35)  HR: 60 (24 Jun 2023 08:00) (60 - 94)  BP: 103/60 (24 Jun 2023 08:00) (97/58 - 127/68)  BP(mean): --  RR: 18 (24 Jun 2023 08:00) (18 - 18)  SpO2: 100% (24 Jun 2023 08:00) (98% - 100%)    Parameters below as of 24 Jun 2023 08:00  Patient On (Oxygen Delivery Method): room air      PHYSICAL EXAM:  GENERAL: NAD, well-developed  HEAD:  Atraumatic, Normocephalic  EYES: EOMI, PERRLA, conjunctiva and sclera clear  NECK: Supple, No JVD  CHEST/LUNG: Clear to auscultation bilaterally; No wheeze  HEART: Regular rate and rhythm; No murmurs, rubs, or gallops  ABDOMEN: Soft, Nontender, Nondistended; Bowel sounds present  EXTREMITIES: RLE swelling, chronic venous changes, chronic appearing wound on anterior leg  PSYCH: AAOx3  NEUROLOGY: non-focal  SKIN: No rashes or lesions    LABS:                                              10.3   4.99  )-----------( 67       ( 24 Jun 2023 03:30 )             33.4   06-24    131<L>  |  101  |  10  ----------------------------<  101<H>  3.8   |  19<L>  |  0.50    Ca    8.8      24 Jun 2023 03:30  Phos  4.3     06-24  Mg     1.80     06-24    TPro  7.9  /  Alb  3.1<L>  /  TBili  0.9  /  DBili  x   /  AST  74<H>  /  ALT  23  /  AlkPhos  178<H>  06-24          Urinalysis Basic - ( 21 Jun 2023 06:00 )    Color: x / Appearance: x / SG: x / pH: x  Gluc: 70 mg/dL / Ketone: x  / Bili: x / Urobili: x   Blood: x / Protein: x / Nitrite: x   Leuk Esterase: x / RBC: x / WBC x   Sq Epi: x / Non Sq Epi: x / Bacteria: x        RADIOLOGY & ADDITIONAL TESTS: < from: VA Duplex Ext Veins Lower Comp, Right. (VA Duplex Ext Veins Lower Comp, Bilat.) (06.21.23 @ 06:14) >  Summary/Impressions:  No evidence of deep vein thrombosis in the right lower  extremity.  No evidence of deep and superficial venous insufficiency  noted in the right lower extremity.  Subcutaneous edema notedat the right calf and ankle.    < end of copied text >      Imaging Personally Reviewed:    Consultant(s) Notes Reviewed:      Care Discussed with Consultants/Other Providers:    Assessment and Plan:

## 2023-06-24 NOTE — PROGRESS NOTE ADULT - ASSESSMENT
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

## 2023-06-24 NOTE — PROGRESS NOTE ADULT - PROBLEM SELECTOR PLAN 6
Has chronic RLE swelling and wound   -No signs of active infection or cellulitis   -VA duplex LE without dvt  -wound care consult appreciated

## 2023-06-24 NOTE — PROGRESS NOTE ADULT - PROBLEM SELECTOR PLAN 5
Has chronic RLE swelling and wound   -No signs of active infection or cellulitis   -VA duplex LE without dvt  -wound care consult
Has chronic RLE swelling and wound   -No signs of active infection or cellulitis   -VA duplex LE without dvt  -wound care consult appreciated
Due to alcohol consumption   -Trend CMP daily
Has chronic RLE swelling and wound   -No signs of active infection or cellulitis   -VA duplex LE without dvt

## 2023-06-24 NOTE — PROGRESS NOTE ADULT - PROBLEM SELECTOR PLAN 3
Due to alcohol consumption   -No signs of active bleeding. CT head showed small R occipital hematoma   -Trend CBC daily   -transfuse pRBCs if Hgb <7   -transfuse plts if plts <10, <15 with fever, or <20 with bleeding
Due to alcohol consumption   -No signs of active bleeding. CT head showed small R occipital hematoma   -Trend CBC daily   -transfuse pRBCs if Hgb <7   -transfuse plts if plts <10, <15 with fever, or <20 with bleeding
Patient with alcohol intoxication, found to have alcohol level of 327   -CIWA protocol with symptom trigger ativan. Most recent CIWA 2  -C/w folic acid and thiamine  -has not required any PRNs, can dc ciwa monitoring
Due to alcohol consumption   -No signs of active bleeding. CT head showed small R occipital hematoma   -Trend CBC daily   -transfuse pRBCs if Hgb <7   -transfuse plts if plts <10, <15 with fever, or <20 with bleeding

## 2023-06-25 ENCOUNTER — TRANSCRIPTION ENCOUNTER (OUTPATIENT)
Age: 33
End: 2023-06-25

## 2023-06-25 VITALS
RESPIRATION RATE: 18 BRPM | SYSTOLIC BLOOD PRESSURE: 110 MMHG | DIASTOLIC BLOOD PRESSURE: 65 MMHG | OXYGEN SATURATION: 100 % | TEMPERATURE: 98 F | HEART RATE: 89 BPM

## 2023-06-25 LAB
ALBUMIN SERPL ELPH-MCNC: 3.1 G/DL — LOW (ref 3.3–5)
ALP SERPL-CCNC: 171 U/L — HIGH (ref 40–120)
ALT FLD-CCNC: 25 U/L — SIGNIFICANT CHANGE UP (ref 4–41)
ANION GAP SERPL CALC-SCNC: 10 MMOL/L — SIGNIFICANT CHANGE UP (ref 7–14)
AST SERPL-CCNC: 68 U/L — HIGH (ref 4–40)
BILIRUB SERPL-MCNC: 0.8 MG/DL — SIGNIFICANT CHANGE UP (ref 0.2–1.2)
BUN SERPL-MCNC: 10 MG/DL — SIGNIFICANT CHANGE UP (ref 7–23)
CALCIUM SERPL-MCNC: 8.7 MG/DL — SIGNIFICANT CHANGE UP (ref 8.4–10.5)
CHLORIDE SERPL-SCNC: 104 MMOL/L — SIGNIFICANT CHANGE UP (ref 98–107)
CO2 SERPL-SCNC: 19 MMOL/L — LOW (ref 22–31)
CREAT SERPL-MCNC: 0.49 MG/DL — LOW (ref 0.5–1.3)
EGFR: 140 ML/MIN/1.73M2 — SIGNIFICANT CHANGE UP
GLUCOSE SERPL-MCNC: 83 MG/DL — SIGNIFICANT CHANGE UP (ref 70–99)
HCT VFR BLD CALC: 31.7 % — LOW (ref 39–50)
HGB BLD-MCNC: 9.6 G/DL — LOW (ref 13–17)
MAGNESIUM SERPL-MCNC: 2 MG/DL — SIGNIFICANT CHANGE UP (ref 1.6–2.6)
MCHC RBC-ENTMCNC: 24.1 PG — LOW (ref 27–34)
MCHC RBC-ENTMCNC: 30.3 GM/DL — LOW (ref 32–36)
MCV RBC AUTO: 79.4 FL — LOW (ref 80–100)
NRBC # BLD: 0 /100 WBCS — SIGNIFICANT CHANGE UP (ref 0–0)
NRBC # FLD: 0 K/UL — SIGNIFICANT CHANGE UP (ref 0–0)
PHOSPHATE SERPL-MCNC: 3.9 MG/DL — SIGNIFICANT CHANGE UP (ref 2.5–4.5)
PLATELET # BLD AUTO: 87 K/UL — LOW (ref 150–400)
POTASSIUM SERPL-MCNC: 3.2 MMOL/L — LOW (ref 3.5–5.3)
POTASSIUM SERPL-SCNC: 3.2 MMOL/L — LOW (ref 3.5–5.3)
PROT SERPL-MCNC: 7.8 G/DL — SIGNIFICANT CHANGE UP (ref 6–8.3)
RBC # BLD: 3.99 M/UL — LOW (ref 4.2–5.8)
RBC # FLD: 22.4 % — HIGH (ref 10.3–14.5)
SODIUM SERPL-SCNC: 133 MMOL/L — LOW (ref 135–145)
WBC # BLD: 4.91 K/UL — SIGNIFICANT CHANGE UP (ref 3.8–10.5)
WBC # FLD AUTO: 4.91 K/UL — SIGNIFICANT CHANGE UP (ref 3.8–10.5)

## 2023-06-25 PROCEDURE — 99239 HOSP IP/OBS DSCHRG MGMT >30: CPT | Mod: GC

## 2023-06-25 RX ADMIN — MUPIROCIN 1 APPLICATION(S): 20 OINTMENT TOPICAL at 18:28

## 2023-06-25 RX ADMIN — Medication 1 MILLIGRAM(S): at 12:16

## 2023-06-25 RX ADMIN — Medication 1 TABLET(S): at 12:16

## 2023-06-25 RX ADMIN — CHLORHEXIDINE GLUCONATE 1 APPLICATION(S): 213 SOLUTION TOPICAL at 12:16

## 2023-06-25 NOTE — DISCHARGE NOTE NURSING/CASE MANAGEMENT/SOCIAL WORK - PATIENT PORTAL LINK FT
You can access the FollowMyHealth Patient Portal offered by [facility] by registering at the following website: [email or web address]. By joining [employer]’s FollowMyHealth portal, you will also be able to view your health information using other applications (apps) compatible with our system.

## 2023-07-05 NOTE — ED ADULT NURSE NOTE - NS ED NOTE ABUSE SUSPICION NEGLECT YN
Specimen with good gross margin for bile duct. 19 Fr drain overlying heapaticojejunostomy and pancreaticojejuniostomy. NG at 58 cm, Corpak at 90 cm. INDICATIONS: The patient is a 68-year-old who was diagnosed with biliary stricture and had cholecystectomy at outside outside hospital. Thsi was complicated by bile leak. On further close follow up, pt was diagnosed with bile duct cancer. Staging work up done and did not find any other metastatic disease. The risks, benefits, and alternatives of pancreaticoduodenectomy procedure were explained to the patient. The patient wishes to proceed with surgery. DETAILS OF PROCEDURE: The patient was brought to the operating room and placed in supine position on the operating room table. General anesthesia was induced, and endotracheal tube was placed. Before coming to the operating room, the patient had an epidural catheter placed. Nasogastric tube and Tse catheter were placed. The patients abdomen was prepped and draped in a sterile manner. Time-out procedure was done according to the hospital policy, confirming the patient, procedure, and administration of antibiotics. A small supraumbilical incision was made. Peritoneal cavity was entered by optiview technique. Thorough evaluation of the entire abdominal cavity was done. There was no definitive evidence of any metastatic disease. Decision was made to proceed with placing robotic trocars for pancreaticoduodenectomy. Appropriate additional robotic ports were placed. Again thorough evaluation done. Attention was first drawn to moving the right colon. There were extensive adhesions from previous gall bladder surgery and h/o bile leak. Tedious prolonged lysis of adhesions was done. A liver nodule noted. This was excised and sent to pathology. We continued with lysis of adhesions. Frozen section path came back as benign.  The attachments of the right colon were divided with scissors with diathermy carefully No Bladder non-tender and non-distended. Urine clear yellow.

## 2023-07-13 NOTE — H&P ADULT - SOCIAL HISTORY: ALCOHOL USE
She was in urgent care:    Patient eloped from the department prior to the imaging being officially resulted.  Staff had discussed with her that we were officially waiting for these results.  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  I would have offered her a wrist splint as well as referral to hand surgery.  I did call and leave this message  with the patient on her voicemail.  If she returns phone call we could offer her a wrist splint as well as hand surgery follow-up.  30 minutes on this patient encounter  Consuelo was seen today for arm.      Yes she should see Dr. Salcedo due to the scapholunate disassociation.  (out of alignment.  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     Daily alcohol use, usually consumes beer and/or vodka. Last drank around 12 PM on 3/14.

## 2023-08-20 ENCOUNTER — INPATIENT (INPATIENT)
Facility: HOSPITAL | Age: 33
LOS: 7 days | Discharge: HOME | End: 2023-08-28
Attending: INTERNAL MEDICINE | Admitting: INTERNAL MEDICINE
Payer: MEDICAID

## 2023-08-20 VITALS
TEMPERATURE: 98 F | HEART RATE: 83 BPM | OXYGEN SATURATION: 100 % | DIASTOLIC BLOOD PRESSURE: 80 MMHG | SYSTOLIC BLOOD PRESSURE: 113 MMHG | RESPIRATION RATE: 17 BRPM

## 2023-08-20 DIAGNOSIS — D61.818 OTHER PANCYTOPENIA: ICD-10-CM

## 2023-08-20 DIAGNOSIS — L03.90 CELLULITIS, UNSPECIFIED: ICD-10-CM

## 2023-08-20 DIAGNOSIS — Z78.9 OTHER SPECIFIED HEALTH STATUS: ICD-10-CM

## 2023-08-20 DIAGNOSIS — Z29.9 ENCOUNTER FOR PROPHYLACTIC MEASURES, UNSPECIFIED: ICD-10-CM

## 2023-08-20 DIAGNOSIS — R74.01 ELEVATION OF LEVELS OF LIVER TRANSAMINASE LEVELS: ICD-10-CM

## 2023-08-20 DIAGNOSIS — D64.9 ANEMIA, UNSPECIFIED: ICD-10-CM

## 2023-08-20 DIAGNOSIS — F17.200 NICOTINE DEPENDENCE, UNSPECIFIED, UNCOMPLICATED: ICD-10-CM

## 2023-08-20 DIAGNOSIS — R07.9 CHEST PAIN, UNSPECIFIED: ICD-10-CM

## 2023-08-20 LAB
ALBUMIN SERPL ELPH-MCNC: 2.9 G/DL — LOW (ref 3.3–5)
ALP SERPL-CCNC: 173 U/L — HIGH (ref 40–120)
ALT FLD-CCNC: 15 U/L — SIGNIFICANT CHANGE UP (ref 4–41)
ANION GAP SERPL CALC-SCNC: 11 MMOL/L — SIGNIFICANT CHANGE UP (ref 7–14)
APAP SERPL-MCNC: <10 UG/ML — LOW (ref 15–25)
APAP SERPL-MCNC: <10 UG/ML — LOW (ref 15–25)
APTT BLD: 41.7 SEC — HIGH (ref 24.5–35.6)
AST SERPL-CCNC: 72 U/L — HIGH (ref 4–40)
BASE EXCESS BLDV CALC-SCNC: 0.9 MMOL/L — SIGNIFICANT CHANGE UP (ref -2–3)
BASOPHILS # BLD AUTO: 0.06 K/UL — SIGNIFICANT CHANGE UP (ref 0–0.2)
BASOPHILS NFR BLD AUTO: 1.7 % — SIGNIFICANT CHANGE UP (ref 0–2)
BILIRUB SERPL-MCNC: 0.6 MG/DL — SIGNIFICANT CHANGE UP (ref 0.2–1.2)
BLD GP AB SCN SERPL QL: NEGATIVE — SIGNIFICANT CHANGE UP
BUN SERPL-MCNC: 5 MG/DL — LOW (ref 7–23)
CA-I SERPL-SCNC: 1.11 MMOL/L — LOW (ref 1.15–1.33)
CALCIUM SERPL-MCNC: 8 MG/DL — LOW (ref 8.4–10.5)
CHLORIDE BLDV-SCNC: 109 MMOL/L — HIGH (ref 96–108)
CHLORIDE SERPL-SCNC: 105 MMOL/L — SIGNIFICANT CHANGE UP (ref 98–107)
CK SERPL-CCNC: 97 U/L — SIGNIFICANT CHANGE UP (ref 30–200)
CO2 BLDV-SCNC: 27.3 MMOL/L — HIGH (ref 22–26)
CO2 SERPL-SCNC: 25 MMOL/L — SIGNIFICANT CHANGE UP (ref 22–31)
CREAT SERPL-MCNC: 0.4 MG/DL — LOW (ref 0.5–1.3)
CRP SERPL-MCNC: 8.4 MG/L — HIGH
EGFR: 149 ML/MIN/1.73M2 — SIGNIFICANT CHANGE UP
EOSINOPHIL # BLD AUTO: 0.24 K/UL — SIGNIFICANT CHANGE UP (ref 0–0.5)
EOSINOPHIL NFR BLD AUTO: 6.6 % — HIGH (ref 0–6)
ERYTHROCYTE [SEDIMENTATION RATE] IN BLOOD: 110 MM/HR — HIGH (ref 1–15)
ETHANOL SERPL-MCNC: 247 MG/DL — HIGH
ETHANOL SERPL-MCNC: 84 MG/DL — HIGH
FERRITIN SERPL-MCNC: 30 NG/ML — SIGNIFICANT CHANGE UP (ref 30–400)
GAS PNL BLDV: 142 MMOL/L — SIGNIFICANT CHANGE UP (ref 136–145)
GAS PNL BLDV: SIGNIFICANT CHANGE UP
GLUCOSE BLDV-MCNC: 91 MG/DL — SIGNIFICANT CHANGE UP (ref 70–99)
GLUCOSE SERPL-MCNC: 99 MG/DL — SIGNIFICANT CHANGE UP (ref 70–99)
HCO3 BLDV-SCNC: 26 MMOL/L — SIGNIFICANT CHANGE UP (ref 22–29)
HCT VFR BLD CALC: 25 % — LOW (ref 39–50)
HCT VFR BLD CALC: 26.7 % — LOW (ref 39–50)
HCT VFR BLDA CALC: 24 % — LOW (ref 39–51)
HGB BLD CALC-MCNC: 7.9 G/DL — LOW (ref 12.6–17.4)
HGB BLD-MCNC: 7.6 G/DL — LOW (ref 13–17)
HGB BLD-MCNC: 8.3 G/DL — LOW (ref 13–17)
IANC: 1.78 K/UL — LOW (ref 1.8–7.4)
IMM GRANULOCYTES NFR BLD AUTO: 0.3 % — SIGNIFICANT CHANGE UP (ref 0–0.9)
INR BLD: 1.3 RATIO — HIGH (ref 0.85–1.18)
IRON SATN MFR SERPL: 25 UG/DL — LOW (ref 45–165)
IRON SATN MFR SERPL: 9 % — LOW (ref 14–50)
LACTATE BLDV-MCNC: 1.2 MMOL/L — SIGNIFICANT CHANGE UP (ref 0.5–2)
LYMPHOCYTES # BLD AUTO: 1.09 K/UL — SIGNIFICANT CHANGE UP (ref 1–3.3)
LYMPHOCYTES # BLD AUTO: 30.2 % — SIGNIFICANT CHANGE UP (ref 13–44)
MAGNESIUM SERPL-MCNC: 1.4 MG/DL — LOW (ref 1.6–2.6)
MCHC RBC-ENTMCNC: 24.8 PG — LOW (ref 27–34)
MCHC RBC-ENTMCNC: 25.1 PG — LOW (ref 27–34)
MCHC RBC-ENTMCNC: 30.4 GM/DL — LOW (ref 32–36)
MCHC RBC-ENTMCNC: 31.1 GM/DL — LOW (ref 32–36)
MCV RBC AUTO: 80.7 FL — SIGNIFICANT CHANGE UP (ref 80–100)
MCV RBC AUTO: 81.7 FL — SIGNIFICANT CHANGE UP (ref 80–100)
MONOCYTES # BLD AUTO: 0.43 K/UL — SIGNIFICANT CHANGE UP (ref 0–0.9)
MONOCYTES NFR BLD AUTO: 11.9 % — SIGNIFICANT CHANGE UP (ref 2–14)
NEUTROPHILS # BLD AUTO: 1.78 K/UL — LOW (ref 1.8–7.4)
NEUTROPHILS NFR BLD AUTO: 49.3 % — SIGNIFICANT CHANGE UP (ref 43–77)
NRBC # BLD: 0 /100 WBCS — SIGNIFICANT CHANGE UP (ref 0–0)
NRBC # BLD: 0 /100 WBCS — SIGNIFICANT CHANGE UP (ref 0–0)
NRBC # FLD: 0 K/UL — SIGNIFICANT CHANGE UP (ref 0–0)
NRBC # FLD: 0 K/UL — SIGNIFICANT CHANGE UP (ref 0–0)
PCO2 BLDV: 43 MMHG — SIGNIFICANT CHANGE UP (ref 42–55)
PH BLDV: 7.39 — SIGNIFICANT CHANGE UP (ref 7.32–7.43)
PHOSPHATE SERPL-MCNC: 3.4 MG/DL — SIGNIFICANT CHANGE UP (ref 2.5–4.5)
PLATELET # BLD AUTO: 105 K/UL — LOW (ref 150–400)
PLATELET # BLD AUTO: 93 K/UL — LOW (ref 150–400)
PO2 BLDV: 73 MMHG — HIGH (ref 25–45)
POTASSIUM BLDV-SCNC: 3.6 MMOL/L — SIGNIFICANT CHANGE UP (ref 3.5–5.1)
POTASSIUM SERPL-MCNC: 3.6 MMOL/L — SIGNIFICANT CHANGE UP (ref 3.5–5.3)
POTASSIUM SERPL-SCNC: 3.6 MMOL/L — SIGNIFICANT CHANGE UP (ref 3.5–5.3)
PROT SERPL-MCNC: 7.5 G/DL — SIGNIFICANT CHANGE UP (ref 6–8.3)
PROTHROM AB SERPL-ACNC: 14.6 SEC — HIGH (ref 9.5–13)
RBC # BLD: 3.06 M/UL — LOW (ref 4.2–5.8)
RBC # BLD: 3.31 M/UL — LOW (ref 4.2–5.8)
RBC # FLD: 20.4 % — HIGH (ref 10.3–14.5)
RBC # FLD: 20.7 % — HIGH (ref 10.3–14.5)
RH IG SCN BLD-IMP: POSITIVE — SIGNIFICANT CHANGE UP
SALICYLATES SERPL-MCNC: <0.3 MG/DL — LOW (ref 15–30)
SALICYLATES SERPL-MCNC: <0.3 MG/DL — LOW (ref 15–30)
SAO2 % BLDV: 92.6 % — HIGH (ref 67–88)
SODIUM SERPL-SCNC: 141 MMOL/L — SIGNIFICANT CHANGE UP (ref 135–145)
TIBC SERPL-MCNC: 271 UG/DL — SIGNIFICANT CHANGE UP (ref 220–430)
TOXICOLOGY SCREEN, DRUGS OF ABUSE, SERUM RESULT: SIGNIFICANT CHANGE UP
TOXICOLOGY SCREEN, DRUGS OF ABUSE, SERUM RESULT: SIGNIFICANT CHANGE UP
TROPONIN T, HIGH SENSITIVITY RESULT: <6 NG/L — SIGNIFICANT CHANGE UP
UIBC SERPL-MCNC: 246 UG/DL — SIGNIFICANT CHANGE UP (ref 110–370)
WBC # BLD: 3.51 K/UL — LOW (ref 3.8–10.5)
WBC # BLD: 3.61 K/UL — LOW (ref 3.8–10.5)
WBC # FLD AUTO: 3.51 K/UL — LOW (ref 3.8–10.5)
WBC # FLD AUTO: 3.61 K/UL — LOW (ref 3.8–10.5)

## 2023-08-20 PROCEDURE — 73590 X-RAY EXAM OF LOWER LEG: CPT | Mod: 26,50

## 2023-08-20 PROCEDURE — 73620 X-RAY EXAM OF FOOT: CPT | Mod: 26,50

## 2023-08-20 PROCEDURE — 99285 EMERGENCY DEPT VISIT HI MDM: CPT

## 2023-08-20 PROCEDURE — 93970 EXTREMITY STUDY: CPT | Mod: 26

## 2023-08-20 PROCEDURE — 99222 1ST HOSP IP/OBS MODERATE 55: CPT

## 2023-08-20 RX ORDER — MAGNESIUM SULFATE 500 MG/ML
1 VIAL (ML) INJECTION ONCE
Refills: 0 | Status: COMPLETED | OUTPATIENT
Start: 2023-08-20 | End: 2023-08-21

## 2023-08-20 RX ORDER — VANCOMYCIN HCL 1 G
1250 VIAL (EA) INTRAVENOUS EVERY 12 HOURS
Refills: 0 | Status: DISCONTINUED | OUTPATIENT
Start: 2023-08-20 | End: 2023-08-21

## 2023-08-20 RX ORDER — ACETAMINOPHEN 500 MG
1000 TABLET ORAL ONCE
Refills: 0 | Status: COMPLETED | OUTPATIENT
Start: 2023-08-20 | End: 2023-08-20

## 2023-08-20 RX ORDER — SODIUM CHLORIDE 0.65 %
1 AEROSOL, SPRAY (ML) NASAL
Refills: 0 | Status: DISCONTINUED | OUTPATIENT
Start: 2023-08-20 | End: 2023-08-28

## 2023-08-20 RX ADMIN — Medication 100 MILLIGRAM(S): at 05:31

## 2023-08-20 RX ADMIN — Medication 1000 MILLIGRAM(S): at 23:37

## 2023-08-20 RX ADMIN — Medication 166.67 MILLIGRAM(S): at 23:37

## 2023-08-20 NOTE — H&P ADULT - PROBLEM SELECTOR PLAN 4
-WBC 3.6, Hgb 9.6 (6/2023)->7.6, plt 93  -patient admits to mild intermittent epistaxis, denies any other bleeding  -f/u iron, ferritin, TIBC  -monitor CBC, maintain active type and screen, transfuse for Hgb>7  -will order saline spray, advised patient to hold compression and lean forward with any future bleeding, may benefit from afrin PRN nose bleed

## 2023-08-20 NOTE — ED ADULT NURSE REASSESSMENT NOTE - NS ED NURSE REASSESS COMMENT FT1
Break RN: Patient awake and resting in stretcher; respirations even and unlabored, no signs/symptoms of acute distress. Non-verbal indicators of pain absent. Safety measures in place, hospitalist at bedside.
patient sleeping aroused with verbal stimuliOX3 speaks minimal english, denies pain, came in c/op pain and swelling to both lower legs and wound on lower right leg with swelling and redness. breathing even and unlabored. skin warm and d ry. IV saline lock intact, flushes well. admitted. awaiting on bed.

## 2023-08-20 NOTE — ED PROVIDER NOTE - PHYSICAL EXAMINATION
Gen: +intoxicated  Head: NCAT  Eyes: EOMI, PERRLA, no conjunctival pallor, no scleral icterus  ENT: mucous membranes moist, no discharge  Neck: neck supple  Resp: CTAB, no W/R/R  CV: RRR, +S1/S2, no M/R/G  GI: Abdomen soft non-distended, NTTP, no masses  MSK: No open wounds, no bruising, no lower extremity edema  Neuro: MAEXE, follows  Ext: +b/l legs bandaged, chronic wounds. +LLE erythema and ttp. NV intact distally

## 2023-08-20 NOTE — H&P ADULT - PROBLEM SELECTOR PLAN 5
-LFTs 72/15 , alcoholic pattern  -f/u RUQ US, hepatitis panel  -monitor LFTs, avoid hepatotoxic agents, counseled on alcohol cessation

## 2023-08-20 NOTE — ED PROVIDER NOTE - OBJECTIVE STATEMENT
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  Patient states that he has had chronic leg pain in setting of chronic vascular disease.  Has had right lower extremity pain and wounds for the past 3 years, and started to have left lower extremity pain x1 month.  Patient  lives at a shelter, states that he was supposed to be evaluated by  On Friday, however the doctor never showed up so he came to the emergency department instead.  He is denying any other complaints such as chest pain, shortness of breath, abdominal pain, nausea, vomiting, fevers or chills.    NKDA Itraconazole Counseling:  I discussed with the patient the risks of itraconazole including but not limited to liver damage, nausea/vomiting, neuropathy, and severe allergy.  The patient understands that this medication is best absorbed when taken with acidic beverages such as non-diet cola or ginger ale.  The patient understands that monitoring is required including baseline LFTs and repeat LFTs at intervals.  The patient understands that they are to contact us or the primary physician if concerning signs are noted.

## 2023-08-20 NOTE — ED PROVIDER NOTE - CLINICAL SUMMARY MEDICAL DECISION MAKING FREE TEXT BOX
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  VSS. +intoxicated. Pt with chronci b/l lower ext wounds, arrives w/ bandage. +LLE swelling, erythema and warmth to palpation, clinical picture c/f cellulitis vs dvt, will obtain US. Anticipate admission for iv abx.

## 2023-08-20 NOTE — H&P ADULT - HISTORY OF PRESENT ILLNESS
32 yo M PMH ETOH abuse PVD presents with leg pain and swelling L>R. Patient states he had pain on the R side for 2.5 years and 2 months ago pain started on the L side. Patient says the pain is constant and nonradiating denies any numbness or tingling. Says the pain is the worse from the knee down. Patient initially went to [facility] 4 days ago after a fall where he hit his knee denies HS, LOC. Was sent home with 2 pills he does not recall what or what treatment he recieved at [place]. Patient had appointment Friday to see wound doctor was not able to see so he went to the ED. Patient also last had 10 bottles of beer yesterday presently A&Ox3, alert, responsive, denies any shaking or visual changes. Patient also describes an episode of central exertional chest pain yesterday lasting all day denies SOB, palpitations, dizziness. In the ED /80->96/58, patient is afebrile and nontachycardic. US LE b/l negative for DVT. XR foot/tibia/fibula was negative for fracture revealed soft tissue swelling b/l feet and calves. Labs significant for SHREE 247, AST/ALT 72/15, , WBC 3.6, Hgb 7.6 (down from 9.6 prior admission), plt 93. Patient describes occasional mild epistaxis but denies any other bleeding no hematochezia or melena. Patient is not on any blood thinners. In the ED patient was administered IV clindamycin.

## 2023-08-20 NOTE — ED ADULT NURSE NOTE - NSFALLRISKINTERV_ED_ALL_ED
Assistance OOB with selected safe patient handling equipment if applicable/Assistance with ambulation/Communicate fall risk and risk factors to all staff, patient, and family/Monitor gait and stability/Provide visual cue: yellow wristband, yellow gown, etc/Reinforce activity limits and safety measures with patient and family/Call bell, personal items and telephone in reach/Instruct patient to call for assistance before getting out of bed/chair/stretcher/Non-slip footwear applied when patient is off stretcher/[place] to call system/Physically safe environment - no spills, clutter or unnecessary equipment/Purposeful Proactive Rounding/Room/bathroom lighting operational, light cord in reach

## 2023-08-20 NOTE — H&P ADULT - NSHPPHYSICALEXAM_GEN_ALL_CORE
Vital Signs Last 24 Hrs  T(C): 37.1 (20 Aug 2023 09:49), Max: 37.1 (20 Aug 2023 09:49)  T(F): 98.7 (20 Aug 2023 09:49), Max: 98.7 (20 Aug 2023 09:49)  HR: 78 (20 Aug 2023 09:49) (78 - 83)  BP: 96/58 (20 Aug 2023 09:49) (96/58 - 113/80)  BP(mean): --  RR: 14 (20 Aug 2023 09:49) (14 - 18)  SpO2: 100% (20 Aug 2023 09:49) (98% - 100%)    Parameters below as of 20 Aug 2023 09:49  Patient On (Oxygen Delivery Method): room air        CONSTITUTIONAL: Well-groomed, in no apparent distress  EYES: No conjunctival or scleral injection, non-icteric;   ENMT: No external nasal lesions; MMM  NECK: Trachea midline without palpable neck mass; thyroid not enlarged and non-tender  RESPIRATORY: Breathing comfortably; no dullness to percussion; lungs CTA without wheeze/rhonchi/rales, pulses 2+ b/l lower extremities  CARDIOVASCULAR: +S1S2, RRR, no M/G/R; pedal pulses full and symmetric; no lower extremity edema  GASTROINTESTINAL: No palpable masses or tenderness, +BS throughout, no rebound/guarding; no hepatosplenomegaly; no hernia palpated  LYMPHATIC: No cervical LAD or tenderness  SKIN: +erythema and warmth from mid shin distally, and venous stasis b/l LE, abrasion on anterior RLE  NEUROLOGIC: CN II-XII intact; sensation intact in LEs b/l to light touch  PSYCHIATRIC: A+O x 3; mood and affect appropriate; appropriate insight and judgment

## 2023-08-20 NOTE — H&P ADULT - PROBLEM SELECTOR PLAN 3
-patient last had 10 bottles of beer yesterday, unable to quantify alcohol consumption during the week  -presently without S/S withdrawal  -monitor CIWA q4h, ativan PRN S/S withdrawal

## 2023-08-20 NOTE — H&P ADULT - SOCIAL HISTORY: ALCOHOL USE
Patient drank 10 bottles of beer yesterday, not able to quantify how much he typically consumes in a week says it is less

## 2023-08-20 NOTE — H&P ADULT - PROBLEM SELECTOR PLAN 1
-presents with b/l leg pain and swelling L>R with erythema from mid shin distally significant for cellulitis  -s/p clindamycin in ED, will treat with IV antibiotics for cellulitis  -b/l US negative for DVT  -tylenol PRN  -f/u inflammatory markers -presents with b/l leg pain and swelling L>R with erythema from mid shin distally significant for cellulitis  -s/p clindamycin in ED, f/u weight prior to ordering antibiotics, would cover with MRSA given hospital admission at [place] this week  -b/l US negative for DVT  -tylenol PRN  -f/u inflammatory markers

## 2023-08-20 NOTE — PATIENT PROFILE ADULT - FALL HARM RISK - HARM RISK INTERVENTIONS
Assistance with ambulation/Assistance OOB with selected safe patient handling equipment/Communicate Risk of Fall with Harm to all staff/Discuss with provider need for PT consult/Monitor for mental status changes/Monitor gait and stability/Provide patient with walking aids - walker, cane, crutches/Reinforce activity limits and safety measures with patient and family/Tailored Fall Risk Interventions/Toileting schedule using arm’s reach rule for commode and bathroom/Use of alarms - bed, chair and/or voice tab/Visual Cue: Yellow wristband and red socks/Bed in lowest position, wheels locked, appropriate side rails in place/Call bell, personal items and telephone in reach/Instruct patient to call for assistance before getting out of bed or chair/Non-slip footwear when patient is out of bed/[place] to call system/Physically safe environment - no spills, clutter or unnecessary equipment/Purposeful Proactive Rounding/Room/bathroom lighting operational, light cord in reach

## 2023-08-20 NOTE — ED ADULT TRIAGE NOTE - CHIEF COMPLAINT QUOTE
Pt arrives to ED c/o bilateral leg pain.  Pt reports his right leg has been bothering him for 3 years and his left leg for 1 month.  Pt is staying in a shelter and a doctor was supposed to visit on Friday but never arrived to see him.  Pt denies other medical problems.  Pt ambulates with a cane.

## 2023-08-20 NOTE — H&P ADULT - ASSESSMENT
32 yo M PMH ETOH abuse PVD presents with b/l leg pain and swelling L>R with erythema from mid shin distally. b/l US negative for DVT. Will treat with IV antibiotics for cellulitis. Patient also with chest pain noncardiac by description would f/u troponin, EKG to r/o ACS. Patient also had 10 drinks yesterday monitor CIWA for S/S withdrawal. Patient also with transaminitis in alcoholic pattern counseled on alcohol cessation.    . Patient states he had pain on the R side for 2.5 years and 2 months ago pain started on the L side. Patient says the pain is constant and nonradiating denies any numbness or tingling. Says the pain is the worse from the knee down. Patient initially went to [facility] 4 days ago after a fall where he hit his knee denies HS, LOC. Was sent home with 2 pills he does not recall what or what treatment he recieved at [place]. Patient had appointment Friday to see wound doctor was not able to see so he went to the ED. Patient also last had 10 bottles of beer yesterday presently A&Ox3, alert, responsive, denies any shaking or visual changes. Patient also describes an episode of central exertional chest pain yesterday lasting all day denies SOB, palpitations, dizziness. In the ED /80->96/58, patient is afebrile and nontachycardic. US LE b/l negative for DVT. XR foot/tibia/fibula was negative for fracture revealed soft tissue swelling b/l feet and calves. Labs significant for SHREE 247, AST/ALT 72/15, , WBC 3.6, Hgb 7.6 (down from 9.6 prior admission), plt 93. Patient describes occasional mild epistaxis but denies any other bleeding no hematochezia or melena. Patient is not on any blood thinners. In the ED patient was administered IV clindamycin.  34 yo M PMH ETOH abuse PVD presents with b/l leg pain and swelling L>R with erythema from mid shin distally. b/l US negative for DVT. Will treat with IV antibiotics for cellulitis. Patient also with chest pain noncardiac by description would f/u troponin, EKG to r/o ACS. Patient also had 10 drinks yesterday monitor CIWA for S/S withdrawal. Patient also with transaminitis in alcoholic pattern counseled on alcohol cessation.

## 2023-08-20 NOTE — H&P ADULT - PROBLEM SELECTOR PLAN 2
-patient described central nonradiating chest pain on exertion yesterday  -f/u EKG, troponin, r/o ACS

## 2023-08-20 NOTE — ED ADULT NURSE NOTE - OBJECTIVE STATEMENT
Pt received in 6a. Pt alert and oriented x 4, ambulatory at baseline. Hx of Etoh abuse, chronic vascular diease. Pt c/o bilateral lower extremity pain rated 8/10 and swelling. Pt reports right leg began hurting about 3 years ago and left lower extremity began hurting about 1 month ago. Right lower extremity wrapped in bandages, pt reports wounds under bandages. Left lower extremity red, swollen. Pt reports living at a shelter and that the doctor was supposed to visit but never came. Respirations even and unlabored. Abdomen soft, round, nondistended, nontender. Pt denies chest pain, shortness of breath, N/V/D, headache, dizziness, fever, chills, weakness, cough,  symptoms, dark stools. 20G IV in the right AC, labs drawn and pt medicated per MD orders. VS in flow sheet.

## 2023-08-20 NOTE — ED PROVIDER NOTE - ATTENDING CONTRIBUTION TO CARE
33-year-old male, history of EtOH daily use with admissions for withdrawal, peripheral vascular disease of the lower extremities, now presents with worsening left more than right lower extremity pain after not having the visiting wound doctor visit him in the shelter before the weekend.  No fevers or other complaints.  Patient states his left calf and foot are newly red and warm but otherwise his wounds and legs appear the same.    VS: afebrile, others reassuring   Gen: Well appearing in NAD, malodorous  Head: NC/AT  ENT: moist mucous membranes   Neck: trachea midline, FROM (painless) - neg Brudzinski   Resp:  No distress  Ext: b/l lower extremity exam: There is no gross visible bony deformity. No significant or asymmetric soft tissue swelling.  Full active & passive ROM.  5/5 strength.  5/5 sensation.  L>R PVD. LLE warm & erythematous from midshin distally. Lower ext extensor mechanism intact.  Neuro:  A&Ox3  Skin:  Warm and dry as visualized  Psych:  appropriate affect and mood, clinically intoxicated    Initial ED MDM: worrisome for LLE cellulitis in pt w/ vascular compromise, also possibly intoxicated. nontoxic appearing & afebrile.   plan for labs, abx, b/l LE US, Xrays,

## 2023-08-21 LAB
ANION GAP SERPL CALC-SCNC: 12 MMOL/L — SIGNIFICANT CHANGE UP (ref 7–14)
APPEARANCE UR: CLEAR — SIGNIFICANT CHANGE UP
BACTERIA # UR AUTO: NEGATIVE /HPF — SIGNIFICANT CHANGE UP
BILIRUB UR-MCNC: NEGATIVE — SIGNIFICANT CHANGE UP
BUN SERPL-MCNC: 6 MG/DL — LOW (ref 7–23)
CALCIUM SERPL-MCNC: 8.3 MG/DL — LOW (ref 8.4–10.5)
CHLORIDE SERPL-SCNC: 98 MMOL/L — SIGNIFICANT CHANGE UP (ref 98–107)
CO2 SERPL-SCNC: 25 MMOL/L — SIGNIFICANT CHANGE UP (ref 22–31)
COLOR SPEC: YELLOW — SIGNIFICANT CHANGE UP
CREAT SERPL-MCNC: 0.41 MG/DL — LOW (ref 0.5–1.3)
DIFF PNL FLD: NEGATIVE — SIGNIFICANT CHANGE UP
EGFR: 148 ML/MIN/1.73M2 — SIGNIFICANT CHANGE UP
EPI CELLS # UR: PRESENT
GLUCOSE SERPL-MCNC: 74 MG/DL — SIGNIFICANT CHANGE UP (ref 70–99)
GLUCOSE UR QL: NEGATIVE MG/DL — SIGNIFICANT CHANGE UP
HAV IGM SER-ACNC: SIGNIFICANT CHANGE UP
HBV CORE IGM SER-ACNC: SIGNIFICANT CHANGE UP
HBV SURFACE AG SER-ACNC: SIGNIFICANT CHANGE UP
HCT VFR BLD CALC: 26.7 % — LOW (ref 39–50)
HCV AB S/CO SERPL IA: 0.17 S/CO — SIGNIFICANT CHANGE UP (ref 0–0.99)
HCV AB SERPL-IMP: SIGNIFICANT CHANGE UP
HGB BLD-MCNC: 8.2 G/DL — LOW (ref 13–17)
KETONES UR-MCNC: NEGATIVE MG/DL — SIGNIFICANT CHANGE UP
LEUKOCYTE ESTERASE UR-ACNC: NEGATIVE — SIGNIFICANT CHANGE UP
MCHC RBC-ENTMCNC: 25.2 PG — LOW (ref 27–34)
MCHC RBC-ENTMCNC: 30.7 GM/DL — LOW (ref 32–36)
MCV RBC AUTO: 82.2 FL — SIGNIFICANT CHANGE UP (ref 80–100)
NITRITE UR-MCNC: NEGATIVE — SIGNIFICANT CHANGE UP
NRBC # BLD: 0 /100 WBCS — SIGNIFICANT CHANGE UP (ref 0–0)
NRBC # FLD: 0 K/UL — SIGNIFICANT CHANGE UP (ref 0–0)
PH UR: 8.5 (ref 5–8)
PLATELET # BLD AUTO: 107 K/UL — LOW (ref 150–400)
POTASSIUM SERPL-MCNC: 3.5 MMOL/L — SIGNIFICANT CHANGE UP (ref 3.5–5.3)
POTASSIUM SERPL-SCNC: 3.5 MMOL/L — SIGNIFICANT CHANGE UP (ref 3.5–5.3)
PROT UR-MCNC: 30 MG/DL
RBC # BLD: 3.25 M/UL — LOW (ref 4.2–5.8)
RBC # FLD: 20.7 % — HIGH (ref 10.3–14.5)
RBC CASTS # UR COMP ASSIST: 5 /HPF — SIGNIFICANT CHANGE UP (ref 0–4)
SODIUM SERPL-SCNC: 135 MMOL/L — SIGNIFICANT CHANGE UP (ref 135–145)
SP GR SPEC: 1.01 — SIGNIFICANT CHANGE UP (ref 1–1.03)
UROBILINOGEN FLD QL: 2 MG/DL (ref 0.2–1)
WBC # BLD: 4.67 K/UL — SIGNIFICANT CHANGE UP (ref 3.8–10.5)
WBC # FLD AUTO: 4.67 K/UL — SIGNIFICANT CHANGE UP (ref 3.8–10.5)
WBC UR QL: 3 /HPF — SIGNIFICANT CHANGE UP (ref 0–5)

## 2023-08-21 PROCEDURE — 76705 ECHO EXAM OF ABDOMEN: CPT | Mod: 26

## 2023-08-21 PROCEDURE — 99232 SBSQ HOSP IP/OBS MODERATE 35: CPT

## 2023-08-21 RX ORDER — FERROUS SULFATE 325(65) MG
325 TABLET ORAL DAILY
Refills: 0 | Status: DISCONTINUED | OUTPATIENT
Start: 2023-08-21 | End: 2023-08-28

## 2023-08-21 RX ORDER — NICOTINE POLACRILEX 2 MG
1 GUM BUCCAL DAILY
Refills: 0 | Status: DISCONTINUED | OUTPATIENT
Start: 2023-08-21 | End: 2023-08-28

## 2023-08-21 RX ORDER — CEFAZOLIN SODIUM 1 G
2000 VIAL (EA) INJECTION EVERY 8 HOURS
Refills: 0 | Status: DISCONTINUED | OUTPATIENT
Start: 2023-08-21 | End: 2023-08-28

## 2023-08-21 RX ADMIN — Medication 1 PATCH: at 16:45

## 2023-08-21 RX ADMIN — Medication 1 SPRAY(S): at 17:18

## 2023-08-21 RX ADMIN — Medication 325 MILLIGRAM(S): at 16:45

## 2023-08-21 RX ADMIN — Medication 100 MILLIGRAM(S): at 21:53

## 2023-08-21 RX ADMIN — Medication 1 PATCH: at 21:20

## 2023-08-21 RX ADMIN — Medication 166.67 MILLIGRAM(S): at 11:54

## 2023-08-21 RX ADMIN — Medication 100 GRAM(S): at 10:25

## 2023-08-21 NOTE — PROGRESS NOTE ADULT - PROBLEM SELECTOR PLAN 2
-patient described central nonradiating chest pain on exertion on admission  -troponin <6  -chest pain resolved

## 2023-08-21 NOTE — PROGRESS NOTE ADULT - SUBJECTIVE AND OBJECTIVE BOX
Tameka Ortiz  [facility] Medicine  Pager #[phone]  Available on Microsoft Teams    Patient is a 33y old  Male who presents with a chief complaint of cellulitis (20 Aug 2023 14:39)      SUBJECTIVE / OVERNIGHT EVENTS: Patient seen and examined at bedside. Language [employer] solutions  #[id number] used for [place] translation. Pt eating breakfast, feels better, less shaky today. Still having pain in his legs, states his R leg has been swollen for 2-3 years, left leg has been swollen for the past few months.    ADDITIONAL REVIEW OF SYSTEMS:    MEDICATIONS  (STANDING):  sodium chloride 0.65% Nasal 1 Spray(s) Both Nostrils two times a day  vancomycin  IVPB 1250 milliGRAM(s) IV Intermittent every 12 hours    MEDICATIONS  (PRN):  LORazepam   Injectable 2 milliGRAM(s) IV Push every 2 hours PRN CIWA-Ar score increase by 2 points and a total score of 7 or less      CAPILLARY BLOOD GLUCOSE        I&O's Summary      PHYSICAL EXAM:    Vital Signs Last 24 Hrs  T(C): 36.9 (21 Aug 2023 10:31), Max: 37.4 (21 Aug 2023 01:35)  T(F): 98.5 (21 Aug 2023 10:31), Max: 99.4 (21 Aug 2023 01:35)  HR: 85 (21 Aug 2023 10:31) (78 - 95)  BP: 138/67 (21 Aug 2023 10:31) (110/67 - 138/67)  BP(mean): --  RR: 16 (21 Aug 2023 10:31) (16 - 18)  SpO2: 100% (21 Aug 2023 10:31) (96% - 100%)    Parameters below as of 21 Aug 2023 10:31  Patient On (Oxygen Delivery Method): room air    CONSTITUTIONAL: NAD  EYES: Conjunctiva and sclera clear  ENMT: Moist oral mucosa  RESPIRATORY: Normal respiratory effort; lungs are clear to auscultation bilaterally  CARDIOVASCULAR: Regular rate and rhythm, normal S1 and S2, no murmur/rub/gallop; 4+ pitting edema b/l lower extremities, R>L  ABDOMEN: Soft, nontender to palpation, normoactive bowel sounds  PSYCH: A+O to person, place, and time; affect appropriate  NEUROLOGY: Mild tremor of b/l arms noted  SKIN: R shin wound    LABS:                        8.2    4.67  )-----------( 107      ( 21 Aug 2023 05:09 )             26.7     08-21    135  |  98  |  6<L>  ----------------------------<  74  3.5   |  25  |  0.41<L>    Ca    8.3<L>      21 Aug 2023 05:09  Phos  3.4     08-20  Mg     1.40     08-20    TPro  7.5  /  Alb  2.9<L>  /  TBili  0.6  /  DBili  x   /  AST  72<H>  /  ALT  15  /  AlkPhos  173<H>  08-20    PT/INR - ( 20 Aug 2023 05:20 )   PT: 14.6 sec;   INR: 1.30 ratio         PTT - ( 20 Aug 2023 05:20 )  PTT:41.7 sec  CARDIAC MARKERS ( 20 Aug 2023 17:30 )  x     / x     / 97 U/L / x     / x          Urinalysis Basic - ( 21 Aug 2023 05:09 )    Color: x / Appearance: x / SG: x / pH: x  Gluc: 74 mg/dL / Ketone: x  / Bili: x / Urobili: x   Blood: x / Protein: x / Nitrite: x   Leuk Esterase: x / RBC: x / WBC x   Sq Epi: x / Non Sq Epi: x / Bacteria: x      RADIOLOGY & ADDITIONAL TESTS:     < from: US Abdomen Upper Quadrant Right (08.21.23 @ 09:30) >  FINDINGS:  Liver: Enlarged, measures 18.4 cm craniocaudally in the right lobe.   Increased echogenicity. No surface nodularity appreciated.  Bile ducts: Normal caliber. Common bile duct measures 3 mm.  Gallbladder: Within normal limits.  Pancreas: Limited visualization. Visualized portions are within normal   limits.  Right kidney: 11.9 cm. No hydronephrosis.  Ascites: None.  IVC: Visualized portions are within normal limits.    IMPRESSION:  Hepatomegaly and hepatic steatosis. No sonographic evidence for cirrhosis.    Results Reviewed: Yes  Imaging Personally Reviewed:  Electrocardiogram Personally Reviewed:    COORDINATION OF CARE:  Care Discussed with Consultants/Other Providers [Y/N]:  Prior or Outpatient Records Reviewed [Y/N]:

## 2023-08-22 LAB
A1C WITH ESTIMATED AVERAGE GLUCOSE RESULT: 5.2 % — SIGNIFICANT CHANGE UP (ref 4–5.6)
ANION GAP SERPL CALC-SCNC: 11 MMOL/L — SIGNIFICANT CHANGE UP (ref 7–14)
BUN SERPL-MCNC: 6 MG/DL — LOW (ref 7–23)
CALCIUM SERPL-MCNC: 8.6 MG/DL — SIGNIFICANT CHANGE UP (ref 8.4–10.5)
CHLORIDE SERPL-SCNC: 101 MMOL/L — SIGNIFICANT CHANGE UP (ref 98–107)
CO2 SERPL-SCNC: 22 MMOL/L — SIGNIFICANT CHANGE UP (ref 22–31)
CREAT SERPL-MCNC: 0.44 MG/DL — LOW (ref 0.5–1.3)
EGFR: 144 ML/MIN/1.73M2 — SIGNIFICANT CHANGE UP
ESTIMATED AVERAGE GLUCOSE: 103 — SIGNIFICANT CHANGE UP
GLUCOSE SERPL-MCNC: 85 MG/DL — SIGNIFICANT CHANGE UP (ref 70–99)
HCT VFR BLD CALC: 27.2 % — LOW (ref 39–50)
HGB BLD-MCNC: 8.2 G/DL — LOW (ref 13–17)
MCHC RBC-ENTMCNC: 24.6 PG — LOW (ref 27–34)
MCHC RBC-ENTMCNC: 30.1 GM/DL — LOW (ref 32–36)
MCV RBC AUTO: 81.4 FL — SIGNIFICANT CHANGE UP (ref 80–100)
NRBC # BLD: 0 /100 WBCS — SIGNIFICANT CHANGE UP (ref 0–0)
NRBC # FLD: 0 K/UL — SIGNIFICANT CHANGE UP (ref 0–0)
PLATELET # BLD AUTO: 124 K/UL — LOW (ref 150–400)
POTASSIUM SERPL-MCNC: 3.3 MMOL/L — LOW (ref 3.5–5.3)
POTASSIUM SERPL-SCNC: 3.3 MMOL/L — LOW (ref 3.5–5.3)
RBC # BLD: 3.34 M/UL — LOW (ref 4.2–5.8)
RBC # FLD: 20.7 % — HIGH (ref 10.3–14.5)
SODIUM SERPL-SCNC: 134 MMOL/L — LOW (ref 135–145)
WBC # BLD: 4.92 K/UL — SIGNIFICANT CHANGE UP (ref 3.8–10.5)
WBC # FLD AUTO: 4.92 K/UL — SIGNIFICANT CHANGE UP (ref 3.8–10.5)

## 2023-08-22 PROCEDURE — 99232 SBSQ HOSP IP/OBS MODERATE 35: CPT

## 2023-08-22 RX ORDER — POTASSIUM CHLORIDE 20 MEQ
40 PACKET (EA) ORAL ONCE
Refills: 0 | Status: COMPLETED | OUTPATIENT
Start: 2023-08-22 | End: 2023-08-22

## 2023-08-22 RX ORDER — ACETAMINOPHEN 500 MG
1000 TABLET ORAL ONCE
Refills: 0 | Status: COMPLETED | OUTPATIENT
Start: 2023-08-22 | End: 2023-08-22

## 2023-08-22 RX ORDER — ACETAMINOPHEN 500 MG
650 TABLET ORAL EVERY 6 HOURS
Refills: 0 | Status: DISCONTINUED | OUTPATIENT
Start: 2023-08-22 | End: 2023-08-28

## 2023-08-22 RX ORDER — OXYCODONE HYDROCHLORIDE 5 MG/1
2.5 TABLET ORAL EVERY 6 HOURS
Refills: 0 | Status: DISCONTINUED | OUTPATIENT
Start: 2023-08-22 | End: 2023-08-25

## 2023-08-22 RX ADMIN — Medication 100 MILLIGRAM(S): at 05:53

## 2023-08-22 RX ADMIN — Medication 40 MILLIEQUIVALENT(S): at 13:12

## 2023-08-22 RX ADMIN — Medication 1 PATCH: at 13:11

## 2023-08-22 RX ADMIN — OXYCODONE HYDROCHLORIDE 2.5 MILLIGRAM(S): 5 TABLET ORAL at 21:38

## 2023-08-22 RX ADMIN — Medication 1 PATCH: at 19:16

## 2023-08-22 RX ADMIN — OXYCODONE HYDROCHLORIDE 2.5 MILLIGRAM(S): 5 TABLET ORAL at 22:35

## 2023-08-22 RX ADMIN — Medication 1 SPRAY(S): at 05:53

## 2023-08-22 RX ADMIN — Medication 1 PATCH: at 13:00

## 2023-08-22 RX ADMIN — Medication 1 PATCH: at 07:28

## 2023-08-22 RX ADMIN — Medication 1000 MILLIGRAM(S): at 03:00

## 2023-08-22 RX ADMIN — Medication 100 MILLIGRAM(S): at 21:19

## 2023-08-22 RX ADMIN — Medication 100 MILLIGRAM(S): at 14:01

## 2023-08-22 RX ADMIN — Medication 1000 MILLIGRAM(S): at 02:10

## 2023-08-22 RX ADMIN — Medication 1 SPRAY(S): at 18:11

## 2023-08-22 RX ADMIN — Medication 325 MILLIGRAM(S): at 13:11

## 2023-08-22 NOTE — PROGRESS NOTE ADULT - PROBLEM SELECTOR PLAN 7
-DVT ppx: SCDs, IMPROVE score 0  -Diet: regular  -SW eval for dispo planning as pt lives in a shelter
-DVT ppx: SCDs, IMPROVE score 0  -Diet: regular  -SW eval for dispo planning as pt lives in a shelter

## 2023-08-22 NOTE — ADVANCED PRACTICE NURSE CONSULT - RECOMMEDATIONS
Follow up with PCP or [facility] ([phone], [address] [place] [address]) for chronic RLE wound.     Topical recommendations:   ---RLE chronic wound: Cleanse with NS, pat dry. Apply betadine swab, allow to dry. Can leave open to air. Cover with gauze pad and paper tape when wearing long pants.   ---Bilateral lower extremities: After bathing/washing up, apply sween24 moisturizer daily, avoid in between the toes.      Plan discussed with patient and primary RN Jayda Blake.  Please contact Wound/Ostomy Care Service Line if we can be of further assistance (ext [phone]). Please reconsult if changes to tissue type noted.  ---Follow up with PCP or [facility] ([phone], [address] [place] [address]) for chronic RLE wound.   ---Consider lymphedema workup given multiple hospital admissions for LE cellulitis, fibrotic skin changes and LE edema, chronic wound.     Topical recommendations:   ---RLE chronic wound: Cleanse with NS, pat dry. Apply betadine swab, allow to dry. Can leave open to air. Cover with gauze pad and paper tape when wearing long pants.   ---Bilateral lower extremities: After bathing/washing up, apply sween24 moisturizer daily, avoid in between the toes.      Plan discussed with patient and primary RN Jayda Blake.  Please contact Wound/Ostomy Care Service Line if we can be of further assistance (ext [phone]). Please reconsult if changes to tissue type noted.  ---Follow up with PCP or [facility] ([phone], [address] [place] [address]) for chronic RLE wound.     Topical recommendations:   ---RLE chronic wound: Cleanse with NS, pat dry. Apply betadine swab, allow to dry. Can leave open to air. Cover with gauze pad and paper tape when wearing long pants.   ---Bilateral lower extremities: After bathing/washing up, apply sween24 moisturizer daily, avoid in between the toes.      Plan discussed with patient and primary RN Jayda Blake.  Please contact Wound/Ostomy Care Service Line if we can be of further assistance (ext [phone]). Please reconsult if changes to tissue type noted.

## 2023-08-22 NOTE — PHYSICAL THERAPY INITIAL EVALUATION ADULT - PERTINENT HX OF CURRENT PROBLEM, REHAB EVAL
33 year old male presents with bilateral leg pain and swelling Left >Right with erythema from mid shin distally. Bilateral US negative for DVT. Will treat with IV antibiotics for cellulitis. Patient also with chest pain noncardiac by description.

## 2023-08-22 NOTE — ADVANCED PRACTICE NURSE CONSULT - REASON FOR CONSULT
Patient seen on skin care rounds after wound care referral received for assessment of skin impairment and recommendations of topical management of RLE wound. Patient is a 34 yo M PMH ETOH abuse PVD presented with b/l leg pain and swelling L>R with erythema from mid shin distally. Patient initially went to [facility] 4 days ago after a fall where he hit his knee denies HS, LOC. b/l US negative for DVT. Patient on IV antibiotic therapy, WA protocol. Patient known to [facility] service line last seen during previous admission on 6/22/23. Chart reviewed: WBC 4.92, H/H 8.2/27.2, INR 1.30, Platelets 124, farrah 19.  Patient seen on skin care rounds after wound care referral received for assessment of skin impairment and recommendations of topical management of RLE wound. Patient is a 32 yo M PMH ETOH abuse PVD presented with b/l leg pain and swelling L>R with erythema from mid shin distally. Patient initially went to [facility] 4 days ago after a fall where he hit his knee denies HS, LOC. b/l US negative for DVT. Patient on IV antibiotic therapy, WA protocol. Patient known to [facility] service line last seen during previous admission on 6/22/23 where patient reported he had RLE shin wound for 2 years. Chart reviewed: WBC 4.92, H/H 8.2/27.2, INR 1.30, Platelets 124, farrah 19.

## 2023-08-22 NOTE — PROGRESS NOTE ADULT - SUBJECTIVE AND OBJECTIVE BOX
Tameka Lee  [facility] Medicine  Pager #[phone]  Available on Microsoft Teams    Patient is a 33y old  Male who presents with a chief complaint of cellulitis (21 Aug 2023 14:55)      SUBJECTIVE / OVERNIGHT EVENTS: Patient seen and examined at bedside.  #[id number] used for translation. Pt endorses continued pain in b/l lower extremities, worse on the right side. Swelling is a little improved today.    ADDITIONAL REVIEW OF SYSTEMS:    MEDICATIONS  (STANDING):  ceFAZolin   IVPB 2000 milliGRAM(s) IV Intermittent every 8 hours  ferrous    sulfate 325 milliGRAM(s) Oral daily  nicotine -   7 mG/24Hr(s) Patch 1 Patch Transdermal daily  sodium chloride 0.65% Nasal 1 Spray(s) Both Nostrils two times a day    MEDICATIONS  (PRN):  LORazepam   Injectable 2 milliGRAM(s) IV Push every 2 hours PRN CIWA-Ar score increase by 2 points and a total score of 7 or less      CAPILLARY BLOOD GLUCOSE        I&O's Summary    21 Aug 2023 07:01  -  22 Aug 2023 07:00  --------------------------------------------------------  IN: 0 mL / OUT: 400 mL / NET: -400 mL        PHYSICAL EXAM:    Vital Signs Last 24 Hrs  T(C): 36.8 (22 Aug 2023 09:24), Max: 37.7 (21 Aug 2023 18:41)  T(F): 98.3 (22 Aug 2023 09:24), Max: 99.9 (21 Aug 2023 18:41)  HR: 90 (22 Aug 2023 09:24) (72 - 100)  BP: 122/74 (22 Aug 2023 09:24) (111/63 - 127/74)  BP(mean): --  RR: 17 (22 Aug 2023 09:24) (16 - 18)  SpO2: 99% (22 Aug 2023 09:24) (97% - 100%)    Parameters below as of 22 Aug 2023 09:24  Patient On (Oxygen Delivery Method): room air    CONSTITUTIONAL: NAD  EYES: Conjunctiva and sclera clear  ENMT: Moist oral mucosa  RESPIRATORY: Normal respiratory effort; lungs are clear to auscultation bilaterally  CARDIOVASCULAR: Regular rate and rhythm, normal S1 and S2, no murmur/rub/gallop; 2+ pitting edema b/l lower extremities, R>L  ABDOMEN: Soft, nontender to palpation, normoactive bowel sounds  PSYCH: A+O to person, place, and time; affect appropriate  NEUROLOGY: No focal deficits  SKIN: RLE erythema below the knee; R shin wound without purulence noted    LABS:                        8.2    4.92  )-----------( 124      ( 22 Aug 2023 05:00 )             27.2     08-22    134<L>  |  101  |  6<L>  ----------------------------<  85  3.3<L>   |  22  |  0.44<L>    Ca    8.6      22 Aug 2023 05:00  Phos  3.4     08-20  Mg     1.40     08-20        CARDIAC MARKERS ( 20 Aug 2023 17:30 )  x     / x     / 97 U/L / x     / x          Urinalysis Basic - ( 22 Aug 2023 05:00 )    Color: x / Appearance: x / SG: x / pH: x  Gluc: 85 mg/dL / Ketone: x  / Bili: x / Urobili: x   Blood: x / Protein: x / Nitrite: x   Leuk Esterase: x / RBC: x / WBC x   Sq Epi: x / Non Sq Epi: x / Bacteria: x      Culture - Blood (collected 20 Aug 2023 17:20)  Source: .Blood Blood  Preliminary Report (21 Aug 2023 22:02):    No growth at 24 hours    Culture - Blood (collected 20 Aug 2023 17:15)  Source: .Blood Blood  Preliminary Report (21 Aug 2023 22:02):    No growth at 24 hours      RADIOLOGY & ADDITIONAL TESTS: No new imaging  Results Reviewed: Yes  Imaging Personally Reviewed:  Electrocardiogram Personally Reviewed:    COORDINATION OF CARE:  Care Discussed with Consultants/Other Providers [Y/N]:  Prior or Outpatient Records Reviewed [Y/N]:

## 2023-08-22 NOTE — SBIRT NOTE ADULT - NSSBIRTALCPASSREFTXDET_GEN_A_CORE
SW able to meet with patient and assess for SBIRT.   via staff member, pt reports he drinks almost daily and received treatment in rehab center as recent as 2 month ago (can't recall name of tx center).  He reports he failed at tx and returned to drinking.  He feels he is unable to stop and at this time is not interested in referral for inpatient treatment facility.  Outpatient referrals for tx and counseling will be provided upon d.c.

## 2023-08-22 NOTE — ADVANCED PRACTICE NURSE CONSULT - ASSESSMENT
General: A&Ox4, ambulatory with cane, continent of urine and stool. Skin warm, dry with increased moisture in intertriginous folds.     Vascular: Bilateral lower extremities with dry flaky skin, hyperpigmentation and warm to touch, increased warmth from below knee to ankle. Capillary refill <3 seconds. BLE +3 palpable DP/PT pulses.  BLE lower legs +2 edema, tender to touch. Denies numbness or tingling.     Right lower anterior leg - chronic wound presents as stable nondraining scab surrounded by hypopigmentation and soft tissue contracture, [id number]. No drainage, no odor. Tender to palpation, periwound with edema, no induration, no fluctuance, no concern for abscess. Goals of care: maintain stable scab, provide antimicrobial support.     Patient reports he frequently scratches his lower legs. Educated patient on wound care and topical treatment, recommended covering wound with dry sterile dressing and paper tape when wearing pants to prevent reopening of scab. Patient verbalized understanding. General: A&Ox4, ambulatory with cane, continent of urine and stool. Skin warm, dry with increased moisture in intertriginous folds.     Vascular: Bilateral lower extremities with dry flaky skin, hyperpigmentation and warm to touch, increased warmth from below knee to ankle. Capillary refill <3 seconds. BLE +3 palpable DP/PT pulses.  BLE lower legs R +2 edema>L +1 edema, tender to touch. Denies numbness or tingling.     Right lower anterior leg - chronic wound presents as stable nondraining scab surrounded by hypopigmentation, soft tissue contracture, and fibrotic skin, area of scab measuring [id number]. No drainage, no odor. Tender to palpation, periwound with edema, no induration, no fluctuance, no concern for abscess. Goals of care: maintain stable scab, provide antimicrobial support.     Patient reports he frequently scratches his lower legs. Educated patient on wound care and topical treatment, discussed impact of smoking on wound healing, recommended covering wound with dry sterile dressing and paper tape when wearing pants to prevent reopening of scab. Patient verbalized understanding. General: A&Ox4, ambulatory with cane, continent of urine and stool. Skin warm, dry with increased moisture in intertriginous folds.     Vascular: Bilateral lower extremities with dry flaky skin, hyperpigmentation and warm to touch, increased warmth from below knee to ankle. Capillary refill <3 seconds. BLE +3 palpable DP/PT pulses.  BLE lower legs R +2 edema>L +1 edema, tender to touch. Denies numbness or tingling. Negative Stemmer sign. MD Tameka Ortiz at bedside reports BLE swelling has improved since yesterday.     Right lower anterior leg - chronic wound presents as stable nondraining scab surrounded by hypopigmentation, soft tissue contracture, and fibrotic skin, area of scab measuring [id number]. No drainage, no odor. Tender to palpation, periwound with edema, no induration, no fluctuance, no concern for abscess. Goals of care: maintain stable scab, provide antimicrobial support.     Patient reports he frequently scratches his lower legs. Educated patient on wound care and topical treatment, discussed impact of smoking on wound healing, recommended covering wound with dry sterile dressing and paper tape when wearing pants to prevent reopening of scab. Patient verbalized understanding.

## 2023-08-22 NOTE — PHYSICAL THERAPY INITIAL EVALUATION ADULT - ADDITIONAL COMMENTS
Patient is homeless, states he was on the streets prior to admission. Patient was independent in ADL. Patient reports he has a walker at [facility]? where he will help sometimes with boxes and trash?

## 2023-08-22 NOTE — PROGRESS NOTE ADULT - PROBLEM/PLAN-7
DISPLAY PLAN FREE TEXT
DISPLAY PLAN FREE TEXT
Luis ROSALES: I agree with the above provided history and exam and addend/modify it as follows.    52M w/ pmh HTN, hypothyroidism - p/w L chest pain intermittent, non-radiating, pressure, like, assc w/ sob x 3 days. Denies n/v/d/c, no leg pain/swelling. 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 reports last stress test was many years ago.    *GEN:   comfortable, in no acute distress, AOx3  *EYES:   pupils equally round and reactive to light, extra-occular movements intact  *HEENT:   airway patent  *CV:   regular rate and rhythm  *RESP:   clear to auscultation bilaterally, non-labored  *ABD:   soft, non-tender  *:   no cva/flank tenderness  *EXTREM:   no MSK tenderness, full ROM throughout, no leg swelling  *SKIN:   dry, intact  *NEURO:   AOx3, no focal weakness or loss of sensation     52M p/w chest pain and sob - concern for ACS, will r/o pna, check screening d-dimer, reassess. Pt amenable to CDU observation overnight for stress, tele monitoring, and cards in AM.     I Carlos Smith MD performed a history and physical exam of the patient and discussed their management with the resident and /or advanced care provider. I reviewed the resident and /or ACP's note and agree with the documented findings and plan of care. My medical decision making and observations are found above.

## 2023-08-23 LAB
ANION GAP SERPL CALC-SCNC: 13 MMOL/L — SIGNIFICANT CHANGE UP (ref 7–14)
BUN SERPL-MCNC: 9 MG/DL — SIGNIFICANT CHANGE UP (ref 7–23)
CALCIUM SERPL-MCNC: 9.1 MG/DL — SIGNIFICANT CHANGE UP (ref 8.4–10.5)
CHLORIDE SERPL-SCNC: 97 MMOL/L — LOW (ref 98–107)
CO2 SERPL-SCNC: 22 MMOL/L — SIGNIFICANT CHANGE UP (ref 22–31)
CREAT SERPL-MCNC: 0.45 MG/DL — LOW (ref 0.5–1.3)
CULTURE RESULTS: SIGNIFICANT CHANGE UP
EGFR: 143 ML/MIN/1.73M2 — SIGNIFICANT CHANGE UP
GLUCOSE SERPL-MCNC: 84 MG/DL — SIGNIFICANT CHANGE UP (ref 70–99)
HCT VFR BLD CALC: 29 % — LOW (ref 39–50)
HGB BLD-MCNC: 8.7 G/DL — LOW (ref 13–17)
MAGNESIUM SERPL-MCNC: 1.9 MG/DL — SIGNIFICANT CHANGE UP (ref 1.6–2.6)
MCHC RBC-ENTMCNC: 24.5 PG — LOW (ref 27–34)
MCHC RBC-ENTMCNC: 30 GM/DL — LOW (ref 32–36)
MCV RBC AUTO: 81.7 FL — SIGNIFICANT CHANGE UP (ref 80–100)
NRBC # BLD: 0 /100 WBCS — SIGNIFICANT CHANGE UP (ref 0–0)
NRBC # FLD: 0 K/UL — SIGNIFICANT CHANGE UP (ref 0–0)
PHOSPHATE SERPL-MCNC: 4.3 MG/DL — SIGNIFICANT CHANGE UP (ref 2.5–4.5)
PLATELET # BLD AUTO: 152 K/UL — SIGNIFICANT CHANGE UP (ref 150–400)
POTASSIUM SERPL-MCNC: 3.6 MMOL/L — SIGNIFICANT CHANGE UP (ref 3.5–5.3)
POTASSIUM SERPL-SCNC: 3.6 MMOL/L — SIGNIFICANT CHANGE UP (ref 3.5–5.3)
RBC # BLD: 3.55 M/UL — LOW (ref 4.2–5.8)
RBC # FLD: 20.7 % — HIGH (ref 10.3–14.5)
SODIUM SERPL-SCNC: 132 MMOL/L — LOW (ref 135–145)
SPECIMEN SOURCE: SIGNIFICANT CHANGE UP
WBC # BLD: 5.77 K/UL — SIGNIFICANT CHANGE UP (ref 3.8–10.5)
WBC # FLD AUTO: 5.77 K/UL — SIGNIFICANT CHANGE UP (ref 3.8–10.5)

## 2023-08-23 PROCEDURE — 99232 SBSQ HOSP IP/OBS MODERATE 35: CPT

## 2023-08-23 RX ORDER — SENNA PLUS 8.6 MG/1
2 TABLET ORAL AT BEDTIME
Refills: 0 | Status: DISCONTINUED | OUTPATIENT
Start: 2023-08-23 | End: 2023-08-28

## 2023-08-23 RX ORDER — POLYETHYLENE GLYCOL 3350 17 G/17G
17 POWDER, FOR SOLUTION ORAL DAILY
Refills: 0 | Status: DISCONTINUED | OUTPATIENT
Start: 2023-08-23 | End: 2023-08-28

## 2023-08-23 RX ADMIN — Medication 100 MILLIGRAM(S): at 15:04

## 2023-08-23 RX ADMIN — Medication 100 MILLIGRAM(S): at 23:10

## 2023-08-23 RX ADMIN — Medication 1 SPRAY(S): at 05:34

## 2023-08-23 RX ADMIN — Medication 1 SPRAY(S): at 17:22

## 2023-08-23 RX ADMIN — Medication 1 PATCH: at 12:55

## 2023-08-23 RX ADMIN — Medication 325 MILLIGRAM(S): at 12:55

## 2023-08-23 RX ADMIN — OXYCODONE HYDROCHLORIDE 2.5 MILLIGRAM(S): 5 TABLET ORAL at 17:22

## 2023-08-23 RX ADMIN — Medication 1 PATCH: at 18:37

## 2023-08-23 RX ADMIN — SENNA PLUS 2 TABLET(S): 8.6 TABLET ORAL at 23:11

## 2023-08-23 RX ADMIN — Medication 1 PATCH: at 07:21

## 2023-08-23 RX ADMIN — Medication 100 MILLIGRAM(S): at 05:35

## 2023-08-23 NOTE — PROGRESS NOTE ADULT - SUBJECTIVE AND OBJECTIVE BOX
Tameka Lee  [facility]  Pager #[phone]  Available on Microsoft Teams    Patient is a 33y old  Male who presents with a chief complaint of cellulitis (22 Aug 2023 14:04)      SUBJECTIVE / OVERNIGHT EVENTS: Patient seen and examined at bedside. [employer]  used for Belarusian translation. Pt states he feels tired, leg pain is better today.    ADDITIONAL REVIEW OF SYSTEMS:    MEDICATIONS  (STANDING):  ceFAZolin   IVPB 2000 milliGRAM(s) IV Intermittent every 8 hours  ferrous    sulfate 325 milliGRAM(s) Oral daily  nicotine -   7 mG/24Hr(s) Patch 1 Patch Transdermal daily  sodium chloride 0.65% Nasal 1 Spray(s) Both Nostrils two times a day    MEDICATIONS  (PRN):  acetaminophen     Tablet .. 650 milliGRAM(s) Oral every 6 hours PRN Mild Pain (1 - 3)  LORazepam   Injectable 2 milliGRAM(s) IV Push every 2 hours PRN CIWA-Ar score increase by 2 points and a total score of 7 or less  oxyCODONE    IR 2.5 milliGRAM(s) Oral every 6 hours PRN Severe Pain (7 - 10)      CAPILLARY BLOOD GLUCOSE        I&O's Summary    22 Aug 2023 07:01  -  23 Aug 2023 07:00  --------------------------------------------------------  IN: 0 mL / OUT: 1075 mL / NET: -1075 mL        PHYSICAL EXAM:    Vital Signs Last 24 Hrs  T(C): 37.2 (23 Aug 2023 14:31), Max: 37.2 (23 Aug 2023 14:31)  T(F): 99 (23 Aug 2023 14:31), Max: 99 (23 Aug 2023 14:31)  HR: 86 (23 Aug 2023 14:31) (86 - 98)  BP: 114/59 (23 Aug 2023 14:31) (105/60 - 128/79)  BP(mean): --  RR: 16 (23 Aug 2023 14:31) (16 - 18)  SpO2: 98% (23 Aug 2023 14:31) (97% - 98%)    Parameters below as of 23 Aug 2023 14:31  Patient On (Oxygen Delivery Method): room air    CONSTITUTIONAL: NAD  EYES: Conjunctiva and sclera clear  ENMT: Moist oral mucosa  RESPIRATORY: Normal respiratory effort; lungs are clear to auscultation bilaterally  CARDIOVASCULAR: Regular rate and rhythm, normal S1 and S2, no murmur/rub/gallop; 1+ pitting edema b/l lower extremities, R>L  ABDOMEN: Soft, nontender to palpation, normoactive bowel sounds  PSYCH: A+O to person, place, and time; drowsy  NEUROLOGY: No focal deficits  SKIN: RLE erythema below the knee; R shin wound without purulence noted    LABS:                        8.7    5.77  )-----------( 152      ( 23 Aug 2023 05:18 )             29.0     08-23    132<L>  |  97<L>  |  9   ----------------------------<  84  3.6   |  22  |  0.45<L>    Ca    9.1      23 Aug 2023 05:18  Phos  4.3     08-23  Mg     1.90     08-23      Urinalysis Basic - ( 23 Aug 2023 05:18 )    Color: x / Appearance: x / SG: x / pH: x  Gluc: 84 mg/dL / Ketone: x  / Bili: x / Urobili: x   Blood: x / Protein: x / Nitrite: x   Leuk Esterase: x / RBC: x / WBC x   Sq Epi: x / Non Sq Epi: x / Bacteria: x    Culture - Infection Control MRSA (collected 21 Aug 2023 12:22)  Source: .INF Nasal  Preliminary Report (22 Aug 2023 21:24):    Culture in progress    Culture - Blood (collected 20 Aug 2023 17:20)  Source: .Blood Blood  Preliminary Report (22 Aug 2023 22:02):    No growth at 48 Hours    Culture - Blood (collected 20 Aug 2023 17:15)  Source: .Blood Blood  Preliminary Report (22 Aug 2023 22:02):    No growth at 48 Hours      RADIOLOGY & ADDITIONAL TESTS: No new imaging  Results Reviewed: Yes  Imaging Personally Reviewed:  Electrocardiogram Personally Reviewed:    COORDINATION OF CARE:  Care Discussed with Consultants/Other Providers [Y/N]:  Prior or Outpatient Records Reviewed [Y/N]:

## 2023-08-23 NOTE — PROGRESS NOTE ADULT - PROBLEM SELECTOR PLAN 2
-patient last had 10 bottles of beer 1 day PTA, unable to quantify alcohol consumption during the week  -presently without S/S withdrawal  -monitor CIWA, symptom-triggered Ativan  -SBIRT consult  -US RUQ reviewed with enlarged liver noted but no cirrhosis  -counseled pt extensively on importance of cutting back/abstaining from further EtOH use

## 2023-08-24 ENCOUNTER — TRANSCRIPTION ENCOUNTER (OUTPATIENT)
Age: 33
End: 2023-08-24

## 2023-08-24 LAB
ANION GAP SERPL CALC-SCNC: 15 MMOL/L — HIGH (ref 7–14)
BUN SERPL-MCNC: 12 MG/DL — SIGNIFICANT CHANGE UP (ref 7–23)
CALCIUM SERPL-MCNC: 8.5 MG/DL — SIGNIFICANT CHANGE UP (ref 8.4–10.5)
CHLORIDE SERPL-SCNC: 99 MMOL/L — SIGNIFICANT CHANGE UP (ref 98–107)
CO2 SERPL-SCNC: 19 MMOL/L — LOW (ref 22–31)
CREAT SERPL-MCNC: 0.48 MG/DL — LOW (ref 0.5–1.3)
EGFR: 141 ML/MIN/1.73M2 — SIGNIFICANT CHANGE UP
GLUCOSE SERPL-MCNC: 90 MG/DL — SIGNIFICANT CHANGE UP (ref 70–99)
HCT VFR BLD CALC: 28.1 % — LOW (ref 39–50)
HGB BLD-MCNC: 8.4 G/DL — LOW (ref 13–17)
MAGNESIUM SERPL-MCNC: 1.7 MG/DL — SIGNIFICANT CHANGE UP (ref 1.6–2.6)
MCHC RBC-ENTMCNC: 24.5 PG — LOW (ref 27–34)
MCHC RBC-ENTMCNC: 29.9 GM/DL — LOW (ref 32–36)
MCV RBC AUTO: 81.9 FL — SIGNIFICANT CHANGE UP (ref 80–100)
MRSA PCR RESULT.: SIGNIFICANT CHANGE UP
NRBC # BLD: 0 /100 WBCS — SIGNIFICANT CHANGE UP (ref 0–0)
NRBC # FLD: 0 K/UL — SIGNIFICANT CHANGE UP (ref 0–0)
PHOSPHATE SERPL-MCNC: 4.3 MG/DL — SIGNIFICANT CHANGE UP (ref 2.5–4.5)
PLATELET # BLD AUTO: 142 K/UL — LOW (ref 150–400)
POTASSIUM SERPL-MCNC: 3.6 MMOL/L — SIGNIFICANT CHANGE UP (ref 3.5–5.3)
POTASSIUM SERPL-SCNC: 3.6 MMOL/L — SIGNIFICANT CHANGE UP (ref 3.5–5.3)
RBC # BLD: 3.43 M/UL — LOW (ref 4.2–5.8)
RBC # FLD: 20.6 % — HIGH (ref 10.3–14.5)
S AUREUS DNA NOSE QL NAA+PROBE: SIGNIFICANT CHANGE UP
SODIUM SERPL-SCNC: 133 MMOL/L — LOW (ref 135–145)
WBC # BLD: 4.67 K/UL — SIGNIFICANT CHANGE UP (ref 3.8–10.5)
WBC # FLD AUTO: 4.67 K/UL — SIGNIFICANT CHANGE UP (ref 3.8–10.5)

## 2023-08-24 PROCEDURE — 99232 SBSQ HOSP IP/OBS MODERATE 35: CPT

## 2023-08-24 RX ORDER — CHLORHEXIDINE GLUCONATE 213 G/1000ML
1 SOLUTION TOPICAL DAILY
Refills: 0 | Status: DISCONTINUED | OUTPATIENT
Start: 2023-08-24 | End: 2023-08-28

## 2023-08-24 RX ADMIN — Medication 1 SPRAY(S): at 06:52

## 2023-08-24 RX ADMIN — Medication 1 PATCH: at 12:55

## 2023-08-24 RX ADMIN — Medication 325 MILLIGRAM(S): at 13:08

## 2023-08-24 RX ADMIN — Medication 1 SPRAY(S): at 17:21

## 2023-08-24 RX ADMIN — Medication 650 MILLIGRAM(S): at 22:17

## 2023-08-24 RX ADMIN — SENNA PLUS 2 TABLET(S): 8.6 TABLET ORAL at 21:20

## 2023-08-24 RX ADMIN — Medication 1 PATCH: at 06:26

## 2023-08-24 RX ADMIN — Medication 1 PATCH: at 13:08

## 2023-08-24 RX ADMIN — Medication 100 MILLIGRAM(S): at 21:19

## 2023-08-24 RX ADMIN — CHLORHEXIDINE GLUCONATE 1 APPLICATION(S): 213 SOLUTION TOPICAL at 13:09

## 2023-08-24 RX ADMIN — Medication 100 MILLIGRAM(S): at 13:12

## 2023-08-24 RX ADMIN — Medication 100 MILLIGRAM(S): at 06:52

## 2023-08-24 RX ADMIN — Medication 650 MILLIGRAM(S): at 23:10

## 2023-08-24 NOTE — PROGRESS NOTE ADULT - PROBLEM SELECTOR PLAN 2
-patient last had 10 bottles of beer 1 day PTA, unable to quantify alcohol consumption during the week  -presently without S/S withdrawal  -d/c CIWA  -SBIRT consult  -US RUQ reviewed with enlarged liver noted but no cirrhosis  -counseled pt extensively on importance of cutting back/abstaining from further EtOH use

## 2023-08-24 NOTE — DISCHARGE NOTE PROVIDER - NSDCFUADDAPPT_GEN_ALL_CORE_FT
PCP or [facility] ([phone], [address] [place] [address]) for chronic RLE wound.

## 2023-08-24 NOTE — PROGRESS NOTE ADULT - SUBJECTIVE AND OBJECTIVE BOX
Tameka Ortiz  [facility]  Pager #[phone]  Available on Microsoft Teams    Patient is a 33y old  Male who presents with a chief complaint of cellulitis (23 Aug 2023 14:49)      SUBJECTIVE / OVERNIGHT EVENTS: Patient seen and examined at bedside. [employer]  #[id number] used for Serbian translation. Pt reports he still has pain in b/l legs, from his knees down to his feet. He is okay with proceeding with shelter placement.    ADDITIONAL REVIEW OF SYSTEMS:    MEDICATIONS  (STANDING):  ceFAZolin   IVPB 2000 milliGRAM(s) IV Intermittent every 8 hours  chlorhexidine 2% Cloths 1 Application(s) Topical daily  ferrous    sulfate 325 milliGRAM(s) Oral daily  nicotine -   7 mG/24Hr(s) Patch 1 Patch Transdermal daily  senna 2 Tablet(s) Oral at bedtime  sodium chloride 0.65% Nasal 1 Spray(s) Both Nostrils two times a day    MEDICATIONS  (PRN):  acetaminophen     Tablet .. 650 milliGRAM(s) Oral every 6 hours PRN Mild Pain (1 - 3)  oxyCODONE    IR 2.5 milliGRAM(s) Oral every 6 hours PRN Severe Pain (7 - 10)  polyethylene glycol 3350 17 Gram(s) Oral daily PRN Constipation      CAPILLARY BLOOD GLUCOSE        I&O's Summary    23 Aug 2023 07:01  -  24 Aug 2023 07:00  --------------------------------------------------------  IN: 0 mL / OUT: 1100 mL / NET: -1100 mL        PHYSICAL EXAM:    Vital Signs Last 24 Hrs  T(C): 36.7 (24 Aug 2023 12:06), Max: 37.2 (23 Aug 2023 14:31)  T(F): 98 (24 Aug 2023 12:06), Max: 99 (23 Aug 2023 14:31)  HR: 94 (24 Aug 2023 12:06) (84 - 94)  BP: 111/56 (24 Aug 2023 12:06) (110/62 - 123/63)  BP(mean): --  RR: 17 (24 Aug 2023 12:06) (16 - 18)  SpO2: 98% (24 Aug 2023 12:06) (96% - 99%)    Parameters below as of 24 Aug 2023 12:06  Patient On (Oxygen Delivery Method): room air    CONSTITUTIONAL: NAD  EYES: Conjunctiva and sclera clear  ENMT: Moist oral mucosa  RESPIRATORY: Normal respiratory effort; lungs are clear to auscultation bilaterally  CARDIOVASCULAR: Regular rate and rhythm, normal S1 and S2, no murmur/rub/gallop; 2+ pitting edema b/l lower extremities, R>L  ABDOMEN: Soft, nontender to palpation, normoactive bowel sounds  PSYCH: A+O to person, place, and time; drowsy  NEUROLOGY: No focal deficits  SKIN: RLE erythema below the knee; R chronic shin wound without drainage; LLE warm to touch    LABS:                        8.4    4.67  )-----------( 142      ( 24 Aug 2023 06:00 )             28.1     08-24    133<L>  |  99  |  12  ----------------------------<  90  3.6   |  19<L>  |  0.48<L>    Ca    8.5      24 Aug 2023 06:00  Phos  4.3     08-24  Mg     1.70     08-24    Urinalysis Basic - ( 24 Aug 2023 06:00 )    Color: x / Appearance: x / SG: x / pH: x  Gluc: 90 mg/dL / Ketone: x  / Bili: x / Urobili: x   Blood: x / Protein: x / Nitrite: x   Leuk Esterase: x / RBC: x / WBC x   Sq Epi: x / Non Sq Epi: x / Bacteria: x      RADIOLOGY & ADDITIONAL TESTS: No new imaging  Results Reviewed: Yes  Imaging Personally Reviewed:  Electrocardiogram Personally Reviewed:    COORDINATION OF CARE:  Care Discussed with Consultants/Other Providers [Y/N]:  Prior or Outpatient Records Reviewed [Y/N]:

## 2023-08-24 NOTE — DISCHARGE NOTE PROVIDER - HOSPITAL COURSE
34 yo M PMH ETOH abuse PVD presents with b/l leg pain and swelling L>R with erythema from mid shin distally. b/l US negative for DVT. Will treat with IV antibiotics for cellulitis. Patient also with chest pain noncardiac by description would f/u troponin, EKG to r/o ACS. Patient also had 10 drinks yesterday monitor CIWA for S/S withdrawal. Patient also with transaminitis in alcoholic pattern counseled on alcohol cessation.    Patient was monitored on CIWA and did not have any signs or symptoms of EtOH withdrawal.    Patient was started on vancomycin for cellulitis, abx switched to cefazolin as MRSA swab was negative. Will treat with abx for 7 days with transition to Keflex on discharge. Patient was evaluated by wound care team who noted that the chronic wound on patient's RLE looked to be improving compared to prior admissions. Patient also noted to have chronic b/l lower extremity swelling which may be due to lymphedema.    Patient was discharged back to shelter. 32 yo M PMH ETOH abuse PVD presents with b/l leg pain and swelling L>R with erythema from mid shin distally. b/l US negative for DVT. Will treat with IV antibiotics for cellulitis. Patient also with chest pain noncardiac by description would f/u troponin, EKG to r/o ACS. Patient also had 10 drinks day PTA, monitor CIWA for S/S withdrawal. Patient also with transaminitis in alcoholic pattern counseled on alcohol cessation.    Patient was monitored on CIWA and did not have any signs or symptoms of EtOH withdrawal.    Patient was started on vancomycin for cellulitis, abx switched to cefazolin as MRSA swab was negative. Completed 7 days of Abx with clinical improvement (but pt does have underlying PVD so chronic skin changes are present on exam). Patient was evaluated by wound care team who noted that the chronic wound on patient's RLE looked to be improving compared to prior admissions. Patient also noted to have chronic b/l lower extremity swelling which may be due to lymphedema.    Plan was for patient to go back to the shelter but he did not want to go back there, he is being discharged, has full capacity at this time.

## 2023-08-24 NOTE — DISCHARGE NOTE PROVIDER - ATTENDING DISCHARGE PHYSICAL EXAMINATION:
VSS, NAD, comfortable  Chest: CTA B/L  Heart: S1S2 Ok  Abd: Soft/NT/ND  Extrem: chronic venous stasis changes, chronic skin changes, +B/L edema, healing wounds on R shin  BMI of 37.6 --> Obesity

## 2023-08-24 NOTE — DISCHARGE NOTE PROVIDER - NSFOLLOWUPCLINICS_GEN_ALL_ED_FT
[facility]  Internal Medicine  [address] [place] [address]  Phone: [phone]  Fax: [phone]

## 2023-08-24 NOTE — DISCHARGE NOTE PROVIDER - NSDCCPCAREPLAN_GEN_ALL_CORE_FT
PRINCIPAL DISCHARGE DIAGNOSIS  Diagnosis: Cellulitis  Assessment and Plan of Treatment: You were hospitalized for cellulitis, which is a skin infection. You will be prescribed oral antibiotics to finish on August 27. Take all medications as prescribed. Use a daily lotion or moisturizer on your legs in order to avoid dry skin and scratching your legs, which can cause an infection.      SECONDARY DISCHARGE DIAGNOSES  Diagnosis: Alcohol use  Assessment and Plan of Treatment: You were monitored for signs of alcohol withdrawal. It is recommended that you stop drinking alcohol in order to allow for proper wound healing and prevent future hospitalizations.     PRINCIPAL DISCHARGE DIAGNOSIS  Diagnosis: Cellulitis  Assessment and Plan of Treatment: You were hospitalized for cellulitis, which is a skin infection. You completed the course of antibiotics in the hospital. Please take all medications as prescribed. Use a daily lotion or moisturizer on your legs in order to avoid dry skin and scratching your legs, which can cause an infection. Please follow up with your PMD in 1-2 weeks after discharge, can follow up at [facility] if this is closer to you.      SECONDARY DISCHARGE DIAGNOSES  Diagnosis: Alcohol use  Assessment and Plan of Treatment: You were monitored for signs of alcohol withdrawal. It is recommended that you stop drinking alcohol in order to allow for proper wound healing and prevent future hospitalizations.

## 2023-08-25 LAB
ALBUMIN SERPL ELPH-MCNC: 3 G/DL — LOW (ref 3.3–5)
ALP SERPL-CCNC: 179 U/L — HIGH (ref 40–120)
ALT FLD-CCNC: 22 U/L — SIGNIFICANT CHANGE UP (ref 4–41)
ANION GAP SERPL CALC-SCNC: 11 MMOL/L — SIGNIFICANT CHANGE UP (ref 7–14)
AST SERPL-CCNC: 89 U/L — HIGH (ref 4–40)
BILIRUB SERPL-MCNC: 0.6 MG/DL — SIGNIFICANT CHANGE UP (ref 0.2–1.2)
BUN SERPL-MCNC: 10 MG/DL — SIGNIFICANT CHANGE UP (ref 7–23)
CALCIUM SERPL-MCNC: 8.7 MG/DL — SIGNIFICANT CHANGE UP (ref 8.4–10.5)
CHLORIDE SERPL-SCNC: 100 MMOL/L — SIGNIFICANT CHANGE UP (ref 98–107)
CO2 SERPL-SCNC: 25 MMOL/L — SIGNIFICANT CHANGE UP (ref 22–31)
CREAT SERPL-MCNC: 0.41 MG/DL — LOW (ref 0.5–1.3)
CULTURE RESULTS: SIGNIFICANT CHANGE UP
CULTURE RESULTS: SIGNIFICANT CHANGE UP
EGFR: 148 ML/MIN/1.73M2 — SIGNIFICANT CHANGE UP
GLUCOSE SERPL-MCNC: 77 MG/DL — SIGNIFICANT CHANGE UP (ref 70–99)
HCT VFR BLD CALC: 28.3 % — LOW (ref 39–50)
HGB BLD-MCNC: 8.5 G/DL — LOW (ref 13–17)
MAGNESIUM SERPL-MCNC: 1.7 MG/DL — SIGNIFICANT CHANGE UP (ref 1.6–2.6)
MCHC RBC-ENTMCNC: 24.9 PG — LOW (ref 27–34)
MCHC RBC-ENTMCNC: 30 GM/DL — LOW (ref 32–36)
MCV RBC AUTO: 83 FL — SIGNIFICANT CHANGE UP (ref 80–100)
NRBC # BLD: 0 /100 WBCS — SIGNIFICANT CHANGE UP (ref 0–0)
NRBC # FLD: 0 K/UL — SIGNIFICANT CHANGE UP (ref 0–0)
PHOSPHATE SERPL-MCNC: 3.5 MG/DL — SIGNIFICANT CHANGE UP (ref 2.5–4.5)
PLATELET # BLD AUTO: 147 K/UL — LOW (ref 150–400)
POTASSIUM SERPL-MCNC: 3.6 MMOL/L — SIGNIFICANT CHANGE UP (ref 3.5–5.3)
POTASSIUM SERPL-SCNC: 3.6 MMOL/L — SIGNIFICANT CHANGE UP (ref 3.5–5.3)
PROT SERPL-MCNC: 8.1 G/DL — SIGNIFICANT CHANGE UP (ref 6–8.3)
RBC # BLD: 3.41 M/UL — LOW (ref 4.2–5.8)
RBC # FLD: 20.6 % — HIGH (ref 10.3–14.5)
SODIUM SERPL-SCNC: 136 MMOL/L — SIGNIFICANT CHANGE UP (ref 135–145)
SPECIMEN SOURCE: SIGNIFICANT CHANGE UP
SPECIMEN SOURCE: SIGNIFICANT CHANGE UP
WBC # BLD: 5.15 K/UL — SIGNIFICANT CHANGE UP (ref 3.8–10.5)
WBC # FLD AUTO: 5.15 K/UL — SIGNIFICANT CHANGE UP (ref 3.8–10.5)

## 2023-08-25 PROCEDURE — 99232 SBSQ HOSP IP/OBS MODERATE 35: CPT

## 2023-08-25 RX ORDER — OXYCODONE HYDROCHLORIDE 5 MG/1
2.5 TABLET ORAL EVERY 6 HOURS
Refills: 0 | Status: DISCONTINUED | OUTPATIENT
Start: 2023-08-25 | End: 2023-08-28

## 2023-08-25 RX ADMIN — SENNA PLUS 2 TABLET(S): 8.6 TABLET ORAL at 22:06

## 2023-08-25 RX ADMIN — Medication 100 MILLIGRAM(S): at 13:31

## 2023-08-25 RX ADMIN — Medication 1 PATCH: at 07:00

## 2023-08-25 RX ADMIN — CHLORHEXIDINE GLUCONATE 1 APPLICATION(S): 213 SOLUTION TOPICAL at 13:29

## 2023-08-25 RX ADMIN — Medication 100 MILLIGRAM(S): at 05:09

## 2023-08-25 RX ADMIN — Medication 1 PATCH: at 13:45

## 2023-08-25 RX ADMIN — Medication 1 PATCH: at 13:28

## 2023-08-25 RX ADMIN — Medication 325 MILLIGRAM(S): at 13:29

## 2023-08-25 RX ADMIN — Medication 1 SPRAY(S): at 05:11

## 2023-08-25 RX ADMIN — Medication 100 MILLIGRAM(S): at 22:06

## 2023-08-25 RX ADMIN — Medication 1 SPRAY(S): at 17:21

## 2023-08-25 NOTE — PROGRESS NOTE ADULT - PROBLEM SELECTOR PLAN 2
-patient last had 10 bottles of beer 1 day PTA, unable to quantify alcohol consumption during the week  -presently without S/S withdrawal  -d/imer CIWA  -SBIRT consult  -US RUQ reviewed with enlarged liver noted but no cirrhosis  -counseled pt extensively on importance of cutting back/abstaining from further EtOH use

## 2023-08-25 NOTE — PROGRESS NOTE ADULT - SUBJECTIVE AND OBJECTIVE BOX
Tameka Ortiz  [facility]  Pager #[phone]  Available on Microsoft Teams    Patient is a 33y old  Male who presents with a chief complaint of cellulitis (24 Aug 2023 14:03)      SUBJECTIVE / OVERNIGHT EVENTS: Patient seen and examined at bedside. [employer]  #[id number] used for Cuban translation. Pt states that he still has pain in his legs below the knee. States he has had swelling and pain of his right leg for the past 2 years and swelling and pain of his left leg for at least the past month.     ADDITIONAL REVIEW OF SYSTEMS:    MEDICATIONS  (STANDING):  ceFAZolin   IVPB 2000 milliGRAM(s) IV Intermittent every 8 hours  chlorhexidine 2% Cloths 1 Application(s) Topical daily  ferrous    sulfate 325 milliGRAM(s) Oral daily  nicotine -   7 mG/24Hr(s) Patch 1 Patch Transdermal daily  senna 2 Tablet(s) Oral at bedtime  sodium chloride 0.65% Nasal 1 Spray(s) Both Nostrils two times a day    MEDICATIONS  (PRN):  acetaminophen     Tablet .. 650 milliGRAM(s) Oral every 6 hours PRN Mild Pain (1 - 3)  oxyCODONE    IR 2.5 milliGRAM(s) Oral every 6 hours PRN Severe Pain (7 - 10)  polyethylene glycol 3350 17 Gram(s) Oral daily PRN Constipation      CAPILLARY BLOOD GLUCOSE        I&O's Summary      PHYSICAL EXAM:    Vital Signs Last 24 Hrs  T(C): 36.9 (25 Aug 2023 13:15), Max: 36.9 (25 Aug 2023 13:15)  T(F): 98.5 (25 Aug 2023 13:15), Max: 98.5 (25 Aug 2023 13:15)  HR: 85 (25 Aug 2023 13:15) (76 - 90)  BP: 108/62 (25 Aug 2023 13:15) (108/62 - 111/72)  BP(mean): 71 (25 Aug 2023 05:08) (71 - 71)  RR: 18 (25 Aug 2023 13:15) (17 - 18)  SpO2: 100% (25 Aug 2023 13:15) (99% - 100%)    Parameters below as of 25 Aug 2023 13:15  Patient On (Oxygen Delivery Method): room air    CONSTITUTIONAL: NAD  EYES: Conjunctiva and sclera clear  ENMT: Moist oral mucosa  RESPIRATORY: Normal respiratory effort; lungs are clear to auscultation bilaterally  CARDIOVASCULAR: Regular rate and rhythm, normal S1 and S2, no murmur/rub/gallop; 1+ pitting edema b/l lower extremities, R>L  ABDOMEN: Soft, nontender to palpation, normoactive bowel sounds  PSYCH: A+O to person, place, and time  NEUROLOGY: No focal deficits  SKIN: RLE erythema below the knee; R chronic shin wound without drainage; LLE warm to touch below the knee    LABS:                        8.5    5.15  )-----------( 147      ( 25 Aug 2023 05:00 )             28.3     08-25    136  |  100  |  10  ----------------------------<  77  3.6   |  25  |  0.41<L>    Ca    8.7      25 Aug 2023 05:00  Phos  3.5     08-25  Mg     1.70     08-25    TPro  8.1  /  Alb  3.0<L>  /  TBili  0.6  /  DBili  x   /  AST  89<H>  /  ALT  22  /  AlkPhos  179<H>  08-25    Urinalysis Basic - ( 25 Aug 2023 05:00 )    Color: x / Appearance: x / SG: x / pH: x  Gluc: 77 mg/dL / Ketone: x  / Bili: x / Urobili: x   Blood: x / Protein: x / Nitrite: x   Leuk Esterase: x / RBC: x / WBC x   Sq Epi: x / Non Sq Epi: x / Bacteria: x    RADIOLOGY & ADDITIONAL TESTS: No new imaging  Results Reviewed: Yes  Imaging Personally Reviewed:  Electrocardiogram Personally Reviewed:    COORDINATION OF CARE:  Care Discussed with Consultants/Other Providers [Y/N]:  Prior or Outpatient Records Reviewed [Y/N]:

## 2023-08-26 LAB
ANION GAP SERPL CALC-SCNC: 10 MMOL/L — SIGNIFICANT CHANGE UP (ref 7–14)
BUN SERPL-MCNC: 13 MG/DL — SIGNIFICANT CHANGE UP (ref 7–23)
CALCIUM SERPL-MCNC: 9 MG/DL — SIGNIFICANT CHANGE UP (ref 8.4–10.5)
CHLORIDE SERPL-SCNC: 103 MMOL/L — SIGNIFICANT CHANGE UP (ref 98–107)
CO2 SERPL-SCNC: 24 MMOL/L — SIGNIFICANT CHANGE UP (ref 22–31)
CREAT SERPL-MCNC: 0.47 MG/DL — LOW (ref 0.5–1.3)
EGFR: 142 ML/MIN/1.73M2 — SIGNIFICANT CHANGE UP
GLUCOSE SERPL-MCNC: 85 MG/DL — SIGNIFICANT CHANGE UP (ref 70–99)
HCT VFR BLD CALC: 30.9 % — LOW (ref 39–50)
HGB BLD-MCNC: 9.2 G/DL — LOW (ref 13–17)
MAGNESIUM SERPL-MCNC: 1.7 MG/DL — SIGNIFICANT CHANGE UP (ref 1.6–2.6)
MCHC RBC-ENTMCNC: 24.4 PG — LOW (ref 27–34)
MCHC RBC-ENTMCNC: 29.8 GM/DL — LOW (ref 32–36)
MCV RBC AUTO: 82 FL — SIGNIFICANT CHANGE UP (ref 80–100)
NRBC # BLD: 0 /100 WBCS — SIGNIFICANT CHANGE UP (ref 0–0)
NRBC # FLD: 0 K/UL — SIGNIFICANT CHANGE UP (ref 0–0)
PHOSPHATE SERPL-MCNC: 4 MG/DL — SIGNIFICANT CHANGE UP (ref 2.5–4.5)
PLATELET # BLD AUTO: 153 K/UL — SIGNIFICANT CHANGE UP (ref 150–400)
POTASSIUM SERPL-MCNC: 3.8 MMOL/L — SIGNIFICANT CHANGE UP (ref 3.5–5.3)
POTASSIUM SERPL-SCNC: 3.8 MMOL/L — SIGNIFICANT CHANGE UP (ref 3.5–5.3)
RBC # BLD: 3.77 M/UL — LOW (ref 4.2–5.8)
RBC # FLD: 20.3 % — HIGH (ref 10.3–14.5)
SODIUM SERPL-SCNC: 137 MMOL/L — SIGNIFICANT CHANGE UP (ref 135–145)
WBC # BLD: 4.6 K/UL — SIGNIFICANT CHANGE UP (ref 3.8–10.5)
WBC # FLD AUTO: 4.6 K/UL — SIGNIFICANT CHANGE UP (ref 3.8–10.5)

## 2023-08-26 PROCEDURE — 99232 SBSQ HOSP IP/OBS MODERATE 35: CPT

## 2023-08-26 RX ORDER — PANTOPRAZOLE SODIUM 20 MG/1
40 TABLET, DELAYED RELEASE ORAL
Refills: 0 | Status: DISCONTINUED | OUTPATIENT
Start: 2023-08-26 | End: 2023-08-28

## 2023-08-26 RX ORDER — THIAMINE MONONITRATE (VIT B1) 100 MG
100 TABLET ORAL DAILY
Refills: 0 | Status: DISCONTINUED | OUTPATIENT
Start: 2023-08-26 | End: 2023-08-28

## 2023-08-26 RX ORDER — FOLIC ACID 0.8 MG
1 TABLET ORAL DAILY
Refills: 0 | Status: DISCONTINUED | OUTPATIENT
Start: 2023-08-26 | End: 2023-08-28

## 2023-08-26 RX ADMIN — Medication 1 SPRAY(S): at 06:02

## 2023-08-26 RX ADMIN — Medication 100 MILLIGRAM(S): at 13:56

## 2023-08-26 RX ADMIN — SENNA PLUS 2 TABLET(S): 8.6 TABLET ORAL at 21:56

## 2023-08-26 RX ADMIN — Medication 100 MILLIGRAM(S): at 21:57

## 2023-08-26 RX ADMIN — Medication 325 MILLIGRAM(S): at 12:19

## 2023-08-26 RX ADMIN — CHLORHEXIDINE GLUCONATE 1 APPLICATION(S): 213 SOLUTION TOPICAL at 13:31

## 2023-08-26 RX ADMIN — Medication 100 MILLIGRAM(S): at 06:02

## 2023-08-26 RX ADMIN — Medication 1 PATCH: at 12:18

## 2023-08-26 RX ADMIN — OXYCODONE HYDROCHLORIDE 2.5 MILLIGRAM(S): 5 TABLET ORAL at 21:57

## 2023-08-26 RX ADMIN — Medication 1 SPRAY(S): at 17:32

## 2023-08-26 RX ADMIN — OXYCODONE HYDROCHLORIDE 2.5 MILLIGRAM(S): 5 TABLET ORAL at 06:10

## 2023-08-26 NOTE — PROGRESS NOTE ADULT - SUBJECTIVE AND OBJECTIVE BOX
Dr. Brooklynn Valente  Pager [phone]    PROGRESS NOTE:     Patient is a 33y old  Male who presents with a chief complaint of cellulitis (25 Aug 2023 13:51)      SUBJECTIVE / OVERNIGHT EVENTS: denies chest pain or sob   ADDITIONAL REVIEW OF SYSTEMS: afebrile     MEDICATIONS  (STANDING):  ceFAZolin   IVPB 2000 milliGRAM(s) IV Intermittent every 8 hours  chlorhexidine 2% Cloths 1 Application(s) Topical daily  ferrous    sulfate 325 milliGRAM(s) Oral daily  nicotine -   7 mG/24Hr(s) Patch 1 Patch Transdermal daily  senna 2 Tablet(s) Oral at bedtime  sodium chloride 0.65% Nasal 1 Spray(s) Both Nostrils two times a day    MEDICATIONS  (PRN):  acetaminophen     Tablet .. 650 milliGRAM(s) Oral every 6 hours PRN Mild Pain (1 - 3)  oxyCODONE    IR 2.5 milliGRAM(s) Oral every 6 hours PRN Severe Pain (7 - 10)  polyethylene glycol 3350 17 Gram(s) Oral daily PRN Constipation      CAPILLARY BLOOD GLUCOSE        I&O's Summary      PHYSICAL EXAM:  Vital Signs Last 24 Hrs  T(C): 36.7 (26 Aug 2023 12:13), Max: 37 (25 Aug 2023 20:30)  T(F): 98 (26 Aug 2023 12:13), Max: 98.6 (25 Aug 2023 20:30)  HR: 83 (26 Aug 2023 12:13) (83 - 88)  BP: 107/57 (26 Aug 2023 12:13) (107/57 - 116/73)  BP(mean): --  RR: 18 (26 Aug 2023 05:48) (18 - 18)  SpO2: 99% (26 Aug 2023 05:48) (99% - 100%)    Parameters below as of 26 Aug 2023 12:13  Patient On (Oxygen Delivery Method): room air        CONSTITUTIONAL: NAD  EYES: Conjunctiva and sclera clear  ENMT: Moist oral mucosa  RESPIRATORY: Normal respiratory effort; lungs are clear to auscultation bilaterally  CARDIOVASCULAR: Regular rate and rhythm, normal S1 and S2, no murmur/rub/gallop; 1+ pitting edema b/l lower extremities, R>L  ABDOMEN: Soft, nontender to palpation, normoactive bowel sounds  PSYCH: A+O to person, place, and time  NEUROLOGY: No focal deficits  SKIN: RLE erythema below the knee; warm and tender to touch, R chronic shin wound without drainage; LLE warm to touch below the knee    LABS:                        9.2    4.60  )-----------( 153      ( 26 Aug 2023 05:50 )             30.9     08-26    137  |  103  |  13  ----------------------------<  85  3.8   |  24  |  0.47<L>    Ca    9.0      26 Aug 2023 05:50  Phos  4.0     08-26  Mg     1.70     08-26    TPro  8.1  /  Alb  3.0<L>  /  TBili  0.6  /  DBili  x   /  AST  89<H>  /  ALT  22  /  AlkPhos  179<H>  08-25          Urinalysis Basic - ( 26 Aug 2023 05:50 )    Color: x / Appearance: x / SG: x / pH: x  Gluc: 85 mg/dL / Ketone: x  / Bili: x / Urobili: x   Blood: x / Protein: x / Nitrite: x   Leuk Esterase: x / RBC: x / WBC x   Sq Epi: x / Non Sq Epi: x / Bacteria: x          RADIOLOGY & ADDITIONAL TESTS:  Results Reviewed:   Imaging Personally Reviewed:  Electrocardiogram Personally Reviewed:    COORDINATION OF CARE:  Care Discussed with Consultants/Other Providers [Y/N]: wong Ho,  and MONAE, waiting for shelter placement. kit hutson    Prior or Outpatient Records Reviewed [Y/N]:

## 2023-08-26 NOTE — PROGRESS NOTE ADULT - PROBLEM SELECTOR PLAN 2
-patient last had 10 bottles of beer 1 day PTA, unable to quantify alcohol consumption during the week  -presently without S/S withdrawal  -d/imer CIWA  - add thiamine/folate   -SBIRT consult  -US RUQ reviewed with enlarged liver noted but no cirrhosis  -counseled pt extensively on importance of cutting back/abstaining from further EtOH use -patient last had 10 bottles of beer 1 day PTA, unable to quantify alcohol consumption during the week  -presently without S/S withdrawal  -d/imer CIWA  - add thiamine/folate , ppi  -SBIRT consult  -US RUQ reviewed with enlarged liver noted but no cirrhosis  -counseled pt extensively on importance of cutting back/abstaining from further EtOH use

## 2023-08-27 PROCEDURE — 99232 SBSQ HOSP IP/OBS MODERATE 35: CPT

## 2023-08-27 RX ORDER — MUPIROCIN 20 MG/G
1 OINTMENT TOPICAL
Refills: 0 | Status: DISCONTINUED | OUTPATIENT
Start: 2023-08-27 | End: 2023-08-28

## 2023-08-27 RX ADMIN — Medication 1 MILLIGRAM(S): at 13:30

## 2023-08-27 RX ADMIN — Medication 1 PATCH: at 07:30

## 2023-08-27 RX ADMIN — OXYCODONE HYDROCHLORIDE 2.5 MILLIGRAM(S): 5 TABLET ORAL at 14:20

## 2023-08-27 RX ADMIN — Medication 100 MILLIGRAM(S): at 13:31

## 2023-08-27 RX ADMIN — Medication 1 SPRAY(S): at 18:09

## 2023-08-27 RX ADMIN — Medication 1 SPRAY(S): at 06:08

## 2023-08-27 RX ADMIN — CHLORHEXIDINE GLUCONATE 1 APPLICATION(S): 213 SOLUTION TOPICAL at 13:31

## 2023-08-27 RX ADMIN — Medication 1 PATCH: at 13:30

## 2023-08-27 RX ADMIN — Medication 100 MILLIGRAM(S): at 06:09

## 2023-08-27 RX ADMIN — Medication 1 PATCH: at 13:28

## 2023-08-27 RX ADMIN — MUPIROCIN 1 APPLICATION(S): 20 OINTMENT TOPICAL at 18:09

## 2023-08-27 RX ADMIN — PANTOPRAZOLE SODIUM 40 MILLIGRAM(S): 20 TABLET, DELAYED RELEASE ORAL at 06:09

## 2023-08-27 RX ADMIN — OXYCODONE HYDROCHLORIDE 2.5 MILLIGRAM(S): 5 TABLET ORAL at 15:20

## 2023-08-27 RX ADMIN — SENNA PLUS 2 TABLET(S): 8.6 TABLET ORAL at 22:30

## 2023-08-27 RX ADMIN — Medication 325 MILLIGRAM(S): at 13:30

## 2023-08-27 RX ADMIN — Medication 1 PATCH: at 20:00

## 2023-08-27 RX ADMIN — Medication 100 MILLIGRAM(S): at 13:29

## 2023-08-27 RX ADMIN — Medication 100 MILLIGRAM(S): at 22:30

## 2023-08-27 NOTE — PROGRESS NOTE ADULT - PROBLEM SELECTOR PROBLEM 4
Addendum  created 06/16/17 1109 by David Sutherland MD    Anesthesia Attestations filed
Pancytopenia
Transaminitis
Pancytopenia
Transaminitis

## 2023-08-27 NOTE — PROGRESS NOTE ADULT - PROBLEM SELECTOR PLAN 6
-DVT ppx: SCDs, IMPROVE score 0  -Diet: regular  -SW eval for dispo planning as pt lives in a shelter. Pt medically stable for d/c planning
-DVT ppx: SCDs, IMPROVE score 0  -Diet: regular  -SW eval for dispo planning as pt is currently homeless
-patient actively smokes 5-6 cigarettes per day  -nicotine patch daily  -counseled on tobacco cessation
-DVT ppx: SCDs, IMPROVE score 0  -Diet: regular  -SW following for shelter placement  -medically ready for discharge
-patient actively smokes 5-6 cigarettes per day  -nicotine patch daily  -counseled on tobacco cessation
-DVT ppx: SCDs, IMPROVE score 0  -Diet: regular  -SW following for shelter placement  -dispo: inpt, likely DC next week
-DVT ppx: SCDs, IMPROVE score 0  -Diet: regular  -SW following for shelter placement  -dispo: inpt, likely DC next week

## 2023-08-27 NOTE — PROGRESS NOTE ADULT - PROBLEM SELECTOR PLAN 2
-patient last had 10 bottles of beer 1 day PTA, unable to quantify alcohol consumption during the week  -presently without S/S withdrawal  -d/imer CIWA  - add thiamine/folate , ppi  -SBIRT consult  -US RUQ reviewed with enlarged liver noted but no cirrhosis  -counseled pt extensively on importance of cutting back/abstaining from further EtOH use

## 2023-08-27 NOTE — PROGRESS NOTE ADULT - PROBLEM SELECTOR PROBLEM 5
Tobacco use disorder
Transaminitis
Tobacco use disorder
Transaminitis
Tobacco use disorder

## 2023-08-27 NOTE — PROGRESS NOTE ADULT - PROBLEM SELECTOR PLAN 1
-presents with b/l leg pain and swelling L>R with erythema from mid shin distally significant for cellulitis  -possible lymphedema in setting of chronic leg swelling for years  -MRSA negative, no abscess/purulence noted on exam; MSSA positive  -c/w cefazolin  -b/l US negative for DVT  -Tylenol PRN  -wound care consulted, appreciate recs
-presents with b/l leg pain and swelling L>R with erythema from mid shin distally significant for cellulitis  -possible lymphedema in setting of chronic leg swelling  -MRSA negative, no abscess/purulence noted on exam; MSSA positive  -c/w cefazolin, will plan for 7 days total of abx, can switch to Keflex on discharge  -b/l US negative for DVT  -Tylenol PRN, oxycodone 2.5mg PRN for severe pain  -wound care consulted, appreciate recs
-presents with b/l leg pain and swelling L>R with erythema from mid shin distally significant for cellulitis  -possible lymphedema in setting of chronic leg swelling for years  -MRSA negative, no abscess/purulence noted on exam; MSSA positive  -c/w cefazolin, will plan for 7 days total of abx, can switch to Keflex on discharge  -b/l US negative for DVT  -Tylenol PRN, oxycodone 2.5mg PRN for severe pain  -wound care consulted, appreciate recs
-presents with b/l leg pain and swelling L>R with erythema from mid shin distally significant for cellulitis  -possible lymphedema in setting of chronic leg swelling for years  -MRSA negative, no abscess/purulence noted on exam; MSSA positive  -c/w cefazolin, will plan for 7 days total of abx, can switch to Keflex on discharge  -b/l US negative for DVT  -Tylenol PRN, oxycodone 2.5mg PRN for severe pain  -wound care consulted, appreciate recs
-presents with b/l leg pain and swelling L>R with erythema from mid shin distally significant for cellulitis  -possible lymphedema in setting of chronic leg swelling  -on vancomycin  -MRSA negative, no abscess/purulence noted on exam; MSSA positive  -can switch abx to cefazolin  -b/l US negative for DVT  -Tylenol PRN  -wound care consult
-presents with b/l leg pain and swelling L>R with erythema from mid shin distally significant for cellulitis  -possible lymphedema in setting of chronic leg swelling  -MRSA negative, no abscess/purulence noted on exam; MSSA positive  -c/w cefazolin 2g q8h, plan for 10-14 day course, can switch to Keflex 500 mg QID on discharge  -b/l US negative for DVT  -Tylenol PRN, oxycodone 2.5mg PRN for severe pain  -wound care consulted, appreciate recs  - F/U SW to shelter placement as pt is homeless, likely next week, will need VIVO Med to Bed prior to DC
-presents with b/l leg pain and swelling L>R with erythema from mid shin distally significant for cellulitis  -possible lymphedema in setting of chronic leg swelling, Xray neg for osteomyelitis  -MRSA negative, no abscess/purulence noted on exam; MSSA positive  -c/w cefazolin 2g q8h, plan for 10-14 day course, can switch to Keflex 500 mg QID on DC  -apply bactroban bid  -b/l US negative for DVT  -Tylenol PRN, oxycodone 2.5mg PRN for severe pain  -wound care consulted, appreciate recs  - F/U SW to shelter placement as pt is homeless, likely next week, will need V[facility]O Med to Bed prior to DC

## 2023-08-27 NOTE — PROGRESS NOTE ADULT - PROBLEM SELECTOR PLAN 3
-counts improving  -in setting of alcohol misuse  -iron deficiency anemia, started on PO ferrous sulfate  -monitor CBC, maintain active type and screen, transfuse for Hgb>7
-counts improving  -in setting of alcohol misuse  -iron deficiency anemia, started on PO ferrous sulfate  -monitor CBC, maintain active type and screen, transfuse for Hgb>7
-patient last had 10 bottles of beer 1 day PTA, unable to quantify alcohol consumption during the week  -presently without S/S withdrawal  -monitor CIWA, symptom-triggered Ativan  -SBIRT consult  -US RUQ reviewed with enlarged liver noted but no cirrhosis  -counseled pt extensively on importance of cutting back/abstaining from further EtOH use
-WBC 3.6, Hgb 9.6 (6/2023)->7.6, plt 93  -counts improving  -in setting of alcohol misuse  -iron deficiency anemia, started on PO ferrous sulfate  -monitor CBC, maintain active type and screen, transfuse for Hgb>7
-counts improving  -in setting of alcohol misuse  -iron deficiency anemia, started on PO ferrous sulfate  -monitor CBC, maintain active type and screen, transfuse for Hgb>7
-patient last had 10 bottles of beer 1 day PTA, unable to quantify alcohol consumption during the week  -presently without S/S withdrawal  -monitor CIWA, symptom-triggered Ativan  -SBIRT consult  -US RUQ reviewed with enlarged liver noted but no cirrhosis  -counseled pt extensively on importance of cutting back/abstaining from further EtOH use
-counts improving  -in setting of alcohol misuse  -iron deficiency anemia, started on PO ferrous sulfate  -monitor CBC, maintain active type and screen, transfuse for Hgb>7

## 2023-08-27 NOTE — PROGRESS NOTE ADULT - PROBLEM SELECTOR PLAN 5
-patient actively smokes 5-6 cigarettes per day  -nicotine patch daily  -counseled on tobacco cessation
-patient actively smokes 5-6 cigarettes per day  -nicotine patch daily  -counseled on tobacco cessation
-LFTs 72/15 , alcoholic pattern  -RUQ US without signs of cirrhosis noted  -monitor LFTs, avoid hepatotoxic agents, counseled on alcohol cessation
-patient actively smokes 5-6 cigarettes per day  -nicotine patch daily  -counseled on tobacco cessation
-patient actively smokes 5-6 cigarettes per day  -nicotine patch daily  -counseled on tobacco cessation
-LFTs 72/15 , alcoholic pattern  -RUQ US without signs of cirrhosis noted  -monitor LFTs, avoid hepatotoxic agents, counseled on alcohol cessation
-patient actively smokes 5-6 cigarettes per day  -nicotine patch daily  -counseled on tobacco cessation

## 2023-08-27 NOTE — PROGRESS NOTE ADULT - PROBLEM SELECTOR PLAN 4
-WBC 3.6, Hgb 9.6 (6/2023)->7.6, plt 93  -counts improving  -in setting of alcohol misuse  -iron deficiency anemia  -start on PO ferrous sulfate  -monitor CBC, maintain active type and screen, transfuse for Hgb>7  -will order saline spray, advised patient to hold compression and lean forward with any future bleeding, may benefit from afrin PRN nose bleed
-LFTs 72/15 , alcoholic pattern  -RUQ US without signs of cirrhosis noted  -monitor LFTs, avoid hepatotoxic agents, counseled on alcohol cessation
-WBC 3.6, Hgb 9.6 (6/2023)->7.6, plt 93  -counts improving  -in setting of alcohol misuse  -iron deficiency anemia  -started on PO ferrous sulfate  -monitor CBC, maintain active type and screen, transfuse for Hgb>7
-LFTs 72/15 , alcoholic pattern  -RUQ US without signs of cirrhosis noted  -monitor LFTs, avoid hepatotoxic agents, counseled on alcohol cessation

## 2023-08-27 NOTE — PROGRESS NOTE ADULT - SUBJECTIVE AND OBJECTIVE BOX
Dr. Brooklynn Valente  Pager [phone]    PROGRESS NOTE:     Patient is a 33y old  Male who presents with a chief complaint of cellulitis (26 Aug 2023 15:58)      SUBJECTIVE / OVERNIGHT EVENTS: denies chest pain or sob , sitll c/o RLE pain at cellulitic site   ADDITIONAL REVIEW OF SYSTEMS: afebrile     MEDICATIONS  (STANDING):  ceFAZolin   IVPB 2000 milliGRAM(s) IV Intermittent every 8 hours  chlorhexidine 2% Cloths 1 Application(s) Topical daily  ferrous    sulfate 325 milliGRAM(s) Oral daily  folic acid 1 milliGRAM(s) Oral daily  mupirocin 2% Ointment 1 Application(s) Topical two times a day  nicotine -   7 mG/24Hr(s) Patch 1 Patch Transdermal daily  pantoprazole    Tablet 40 milliGRAM(s) Oral before breakfast  senna 2 Tablet(s) Oral at bedtime  sodium chloride 0.65% Nasal 1 Spray(s) Both Nostrils two times a day  thiamine 100 milliGRAM(s) Oral daily    MEDICATIONS  (PRN):  acetaminophen     Tablet .. 650 milliGRAM(s) Oral every 6 hours PRN Mild Pain (1 - 3)  oxyCODONE    IR 2.5 milliGRAM(s) Oral every 6 hours PRN Severe Pain (7 - 10)  polyethylene glycol 3350 17 Gram(s) Oral daily PRN Constipation      CAPILLARY BLOOD GLUCOSE        I&O's Summary      PHYSICAL EXAM:  Vital Signs Last 24 Hrs  T(C): 36.6 (27 Aug 2023 05:02), Max: 36.8 (26 Aug 2023 20:48)  T(F): 97.8 (27 Aug 2023 05:02), Max: 98.3 (26 Aug 2023 20:48)  HR: 64 (27 Aug 2023 05:02) (64 - 86)  BP: 102/56 (27 Aug 2023 05:02) (102/56 - 106/54)  BP(mean): --  RR: 18 (27 Aug 2023 05:02) (17 - 18)  SpO2: 100% (27 Aug 2023 05:02) (99% - 100%)    Parameters below as of 27 Aug 2023 05:02  Patient On (Oxygen Delivery Method): room air      CONSTITUTIONAL: NAD  EYES: Conjunctiva and sclera clear  ENMT: Moist oral mucosa  RESPIRATORY: Normal respiratory effort; lungs are clear to auscultation bilaterally  CARDIOVASCULAR: Regular rate and rhythm, normal S1 and S2, no murmur/rub/gallop; 1+ pitting edema b/l lower extremities, R>L  ABDOMEN: Soft, nontender to palpation, normoactive bowel sounds  PSYCH: A+O to person, place, and time  NEUROLOGY: No focal deficits  SKIN: RLE distal leg wound with erythema/warmth/ttp no purulence, LLE distal cellulitis resolving    LABS:                        9.2    4.60  )-----------( 153      ( 26 Aug 2023 05:50 )             30.9     08-26    137  |  103  |  13  ----------------------------<  85  3.8   |  24  |  0.47<L>    Ca    9.0      26 Aug 2023 05:50  Phos  4.0     08-26  Mg     1.70     08-26            Urinalysis Basic - ( 26 Aug 2023 05:50 )    Color: x / Appearance: x / SG: x / pH: x  Gluc: 85 mg/dL / Ketone: x  / Bili: x / Urobili: x   Blood: x / Protein: x / Nitrite: x   Leuk Esterase: x / RBC: x / WBC x   Sq Epi: x / Non Sq Epi: x / Bacteria: x          RADIOLOGY & ADDITIONAL TESTS:  Results Reviewed:   Imaging Personally Reviewed:  Electrocardiogram Personally Reviewed:    COORDINATION OF CARE:  Care Discussed with Consultants/Other Providers [Y/N]:  Prior or Outpatient Records Reviewed [Y/N]:

## 2023-08-27 NOTE — PROGRESS NOTE ADULT - PROBLEM SELECTOR PROBLEM 6
Need for prophylactic measure
Tobacco use disorder
Tobacco use disorder

## 2023-08-27 NOTE — PROGRESS NOTE ADULT - ASSESSMENT
32 yo M PMH ETOH abuse PVD presents with b/l leg pain and swelling L>R with erythema from mid shin distally. b/l US negative for DVT. Will treat with IV antibiotics for cellulitis. Patient also with chest pain noncardiac by description would f/u troponin, EKG to r/o ACS. Patient also had 10 drinks yesterday monitor CIWA for S/S withdrawal. Patient also with transaminitis in alcoholic pattern counseled on alcohol cessation.
34 yo M PMH ETOH abuse PVD presents with b/l leg pain and swelling L>R with erythema from mid shin distally. b/l US negative for DVT. Will treat with IV antibiotics for cellulitis. Patient also with chest pain noncardiac by description would f/u troponin, EKG to r/o ACS. Patient also had 10 drinks yesterday monitor CIWA for S/S withdrawal. Patient also with transaminitis in alcoholic pattern counseled on alcohol cessation.
32 yo M PMH ETOH abuse PVD presents with b/l leg pain and swelling L>R with erythema from mid shin distally. b/l US negative for DVT. Will treat with IV antibiotics for cellulitis. Patient also with chest pain noncardiac by description would f/u troponin, EKG to r/o ACS. Patient also had 10 drinks yesterday monitor CIWA for S/S withdrawal. Patient also with transaminitis in alcoholic pattern counseled on alcohol cessation.
32 yo M PMH ETOH abuse PVD presents with b/l leg pain and swelling L>R with erythema from mid shin distally. b/l US negative for DVT. Will treat with IV antibiotics for cellulitis. Patient also with chest pain noncardiac by description would f/u troponin, EKG to r/o ACS. Patient also had 10 drinks yesterday monitor CIWA for S/S withdrawal. Patient also with transaminitis in alcoholic pattern counseled on alcohol cessation.
34 yo M PMH ETOH abuse PVD presents with b/l leg pain and swelling L>R with erythema from mid shin distally. b/l US negative for DVT. Will treat with IV antibiotics for cellulitis. Patient also with chest pain noncardiac by description would f/u troponin, EKG to r/o ACS. Patient also had 10 drinks yesterday monitor CIWA for S/S withdrawal. Patient also with transaminitis in alcoholic pattern counseled on alcohol cessation.
32 yo M PMH ETOH abuse PVD presents with b/l leg pain and swelling L>R with erythema from mid shin distally. b/l US negative for DVT. Will treat with IV antibiotics for cellulitis. Patient also with chest pain noncardiac by description would f/u troponin, EKG to r/o ACS. Patient also had 10 drinks yesterday monitor CIWA for S/S withdrawal. Patient also with transaminitis in alcoholic pattern counseled on alcohol cessation.
32 yo M PMH ETOH abuse PVD presents with b/l leg pain and swelling L>R with erythema from mid shin distally. b/l US negative for DVT. Will treat with IV antibiotics for cellulitis. Patient also with chest pain noncardiac by description would f/u troponin, EKG to r/o ACS. Patient also had 10 drinks yesterday monitor CIWA for S/S withdrawal. Patient also with transaminitis in alcoholic pattern counseled on alcohol cessation.

## 2023-08-28 ENCOUNTER — TRANSCRIPTION ENCOUNTER (OUTPATIENT)
Age: 33
End: 2023-08-28

## 2023-08-28 VITALS
DIASTOLIC BLOOD PRESSURE: 64 MMHG | OXYGEN SATURATION: 100 % | HEART RATE: 70 BPM | TEMPERATURE: 98 F | RESPIRATION RATE: 18 BRPM | SYSTOLIC BLOOD PRESSURE: 109 MMHG

## 2023-08-28 PROCEDURE — 99239 HOSP IP/OBS DSCHRG MGMT >30: CPT

## 2023-08-28 RX ORDER — MUPIROCIN 20 MG/G
1 OINTMENT TOPICAL
Qty: 0 | Refills: 0 | DISCHARGE
Start: 2023-08-28

## 2023-08-28 RX ORDER — ACETAMINOPHEN 500 MG
2 TABLET ORAL
Qty: 0 | Refills: 0 | DISCHARGE
Start: 2023-08-28

## 2023-08-28 RX ADMIN — MUPIROCIN 1 APPLICATION(S): 20 OINTMENT TOPICAL at 05:35

## 2023-08-28 RX ADMIN — Medication 1 SPRAY(S): at 17:24

## 2023-08-28 RX ADMIN — Medication 100 MILLIGRAM(S): at 05:35

## 2023-08-28 RX ADMIN — Medication 1 MILLIGRAM(S): at 13:13

## 2023-08-28 RX ADMIN — MUPIROCIN 1 APPLICATION(S): 20 OINTMENT TOPICAL at 17:24

## 2023-08-28 RX ADMIN — Medication 1 PATCH: at 07:25

## 2023-08-28 RX ADMIN — Medication 1 PATCH: at 13:13

## 2023-08-28 RX ADMIN — Medication 100 MILLIGRAM(S): at 13:13

## 2023-08-28 RX ADMIN — PANTOPRAZOLE SODIUM 40 MILLIGRAM(S): 20 TABLET, DELAYED RELEASE ORAL at 06:18

## 2023-08-28 RX ADMIN — Medication 1 SPRAY(S): at 05:36

## 2023-08-28 RX ADMIN — Medication 100 MILLIGRAM(S): at 13:12

## 2023-08-28 RX ADMIN — Medication 325 MILLIGRAM(S): at 13:13

## 2023-08-28 RX ADMIN — CHLORHEXIDINE GLUCONATE 1 APPLICATION(S): 213 SOLUTION TOPICAL at 13:13

## 2023-08-28 NOTE — DISCHARGE NOTE NURSING/CASE MANAGEMENT/SOCIAL WORK - PATIENT PORTAL LINK FT
You can access the FollowMyHealth Patient Portal offered by [facility] by registering at the following website: [email or web address]. By joining [facility] FollowMyHealth portal, you will also be able to view your health information using other applications (apps) compatible with our system.

## 2023-08-28 NOTE — DISCHARGE NOTE NURSING/CASE MANAGEMENT/SOCIAL WORK - NSDCFUADDAPPT_GEN_ALL_CORE_FT
PCP or [facility] ([phone], [address] [place] [address]) for chronic RLE wound.

## 2024-07-26 NOTE — ED ADULT NURSE NOTE - CHIEF COMPLAINT QUOTE
Patient ID: Abhishek Olson is a 62 y.o. male.    L Inj/Asp: R knee on 7/26/2024 10:00 AM  Indications: pain  Details: 21 G needle, ultrasound-guided superolateral approach  Medications: 48 mg hylan 48 mg/6 mL  Outcome: tolerated well, no immediate complications  Procedure, treatment alternatives, risks and benefits explained, specific risks discussed. Consent was given by the patient. Immediately prior to procedure a time out was called to verify the correct patient, procedure, equipment, support staff and site/side marked as required. Patient was prepped and draped in the usual sterile fashion.           Sports Medicine Office Note    Today's Date:  07/26/2024     HPI: Abhishek Olson is a 62 y.o. recently retired professional football  for the [employer] who presents today for follow-up of chronic right knee DJD pain.    Procedure Note:  After consent was obtained, the RIGHT knee was prepped in a sterile fashion. Ultrasound guidance was used to help insure proper needle placement into the knee joint, decrease patient discomfort, and decrease collateral damage. The joint was visualized and Synvisc 1, with PRP, was injected without any complications. Ultrasound images were saved on an internal file for later reference. The patient tolerated the procedure well and the area was cleaned and bandaged.      Problem List Items Addressed This Visit    None  Visit Diagnoses         Codes    Primary osteoarthritis of right knee     M17.11    Relevant Orders    Point of Care Ultrasound (Completed)          **This note was dictated using Dragon speech recognition software and was not corrected for spelling or grammatical errors**.    Cedrick Hernandez MD  Sports Medicine Specialist  [facility] [place]       Pt is c/o bilateral foot pain and swelling but more pain on the right foot. Pt was seen in this hospital ten days ago but he was unable to fill the prescription. Has not taken any pain medicine. Pt says he walked from far tonight to get to the ER. PMH of ETOH abuse. Pt admits drinking a few drinks tonight. The last drink was around 11pm.

## 2024-08-29 NOTE — DISCHARGE NOTE PROVIDER - NSDCMRMEDTOKEN_GEN_ALL_CORE_FT
[Negative] : Heme/Lymph acetaminophen 325 mg oral tablet: 2 tab(s) orally every 6 hours As needed Mild Pain (1 - 3)  mupirocin 2% topical ointment: 1 Apply topically to affected area 2 times a day

## 2024-12-10 NOTE — DIETITIAN INITIAL EVALUATION ADULT - WEIGHT FOR BMI (LBS)
Consent: The patient's consent was obtained including but not limited to risks of crusting, scabbing, blistering, scarring, darker or lighter pigmentary change, recurrence, incomplete removal and infection. 196.9

## 2024-12-13 NOTE — DISCHARGE NOTE PROVIDER - NSDCCONDITION_GEN_ALL_CORE
Stable
Viral Respiratory Infection    A viral respiratory infection is an illness that affects parts of the body used for breathing, like the lungs, nose, and throat. It is caused by a germ called a virus. Symptoms can include runny nose, coughing, sneezing, fatigue, body aches, sore throat, fever, or headache. Over the counter medicine can be used to manage the symptoms but the infection typically goes away on its own in 5 to 10 days.     SEEK IMMEDIATE MEDICAL CARE IF YOU HAVE ANY OF THE FOLLOWING SYMPTOMS: shortness of breath, chest pain, fever over 10 days, or lightheadedness/dizziness.
